# Patient Record
Sex: FEMALE | Race: WHITE | NOT HISPANIC OR LATINO | Employment: PART TIME | URBAN - METROPOLITAN AREA
[De-identification: names, ages, dates, MRNs, and addresses within clinical notes are randomized per-mention and may not be internally consistent; named-entity substitution may affect disease eponyms.]

---

## 2017-10-18 DIAGNOSIS — Z32.01 PREGNANCY TEST POSITIVE: Primary | ICD-10-CM

## 2017-10-26 ENCOUNTER — PRENATAL OFFICE VISIT (OUTPATIENT)
Dept: NURSING | Facility: CLINIC | Age: 32
End: 2017-10-26
Payer: COMMERCIAL

## 2017-10-26 DIAGNOSIS — Z34.90 SUPERVISION OF NORMAL PREGNANCY: ICD-10-CM

## 2017-10-26 DIAGNOSIS — Z32.01 PREGNANCY TEST POSITIVE: Primary | ICD-10-CM

## 2017-10-26 LAB
ABO + RH BLD: NORMAL
ABO + RH BLD: NORMAL
BETA HCG QUAL IFA URINE: POSITIVE
BLD GP AB SCN SERPL QL: NORMAL
BLOOD BANK CMNT PATIENT-IMP: NORMAL
ERYTHROCYTE [DISTWIDTH] IN BLOOD BY AUTOMATED COUNT: 12.9 % (ref 10–15)
HCT VFR BLD AUTO: 37.5 % (ref 35–47)
HGB BLD-MCNC: 12.4 G/DL (ref 11.7–15.7)
MCH RBC QN AUTO: 30.5 PG (ref 26.5–33)
MCHC RBC AUTO-ENTMCNC: 33.1 G/DL (ref 31.5–36.5)
MCV RBC AUTO: 92 FL (ref 78–100)
PLATELET # BLD AUTO: 155 10E9/L (ref 150–450)
RBC # BLD AUTO: 4.06 10E12/L (ref 3.8–5.2)
SPECIMEN EXP DATE BLD: NORMAL
WBC # BLD AUTO: 8.8 10E9/L (ref 4–11)

## 2017-10-26 PROCEDURE — 86900 BLOOD TYPING SEROLOGIC ABO: CPT | Performed by: FAMILY MEDICINE

## 2017-10-26 PROCEDURE — 86901 BLOOD TYPING SEROLOGIC RH(D): CPT | Performed by: FAMILY MEDICINE

## 2017-10-26 PROCEDURE — 84703 CHORIONIC GONADOTROPIN ASSAY: CPT | Performed by: FAMILY MEDICINE

## 2017-10-26 PROCEDURE — 99207 ZZC NO CHARGE NURSE ONLY: CPT

## 2017-10-26 PROCEDURE — 85027 COMPLETE CBC AUTOMATED: CPT | Performed by: FAMILY MEDICINE

## 2017-10-26 PROCEDURE — 87389 HIV-1 AG W/HIV-1&-2 AB AG IA: CPT | Performed by: FAMILY MEDICINE

## 2017-10-26 PROCEDURE — 86762 RUBELLA ANTIBODY: CPT | Performed by: FAMILY MEDICINE

## 2017-10-26 PROCEDURE — G0499 HEPB SCREEN HIGH RISK INDIV: HCPCS | Performed by: FAMILY MEDICINE

## 2017-10-26 PROCEDURE — 86850 RBC ANTIBODY SCREEN: CPT | Performed by: FAMILY MEDICINE

## 2017-10-26 PROCEDURE — 36415 COLL VENOUS BLD VENIPUNCTURE: CPT | Performed by: FAMILY MEDICINE

## 2017-10-26 PROCEDURE — 87086 URINE CULTURE/COLONY COUNT: CPT | Performed by: FAMILY MEDICINE

## 2017-10-26 PROCEDURE — 86780 TREPONEMA PALLIDUM: CPT | Performed by: FAMILY MEDICINE

## 2017-10-26 RX ORDER — PRENATAL VIT/IRON FUM/FOLIC AC 27MG-0.8MG
1 TABLET ORAL DAILY
Qty: 100 TABLET | Refills: 3 | COMMUNITY
Start: 2017-10-26 | End: 2024-02-27

## 2017-10-26 NOTE — MR AVS SNAPSHOT
After Visit Summary   10/26/2017    Anne Costello    MRN: [id number]           Patient Information     Date Of Birth          1985        Visit Information        Provider Department      10/26/2017 9:00 AM RI PRENATAL NURSE [facility]        Today's Diagnoses     Pregnancy test positive    -  1    Supervision of normal pregnancy           Follow-ups after your visit        Your next 10 appointments already scheduled     Oct 31, 2017  8:00 AM CDT   New Prenatal with GURDEEP Rodríguez CNM   [facility]    [address]  [facility] [id number] [phone]            Nov 28, 2017  9:00 AM CST   ESTABLISHED PRENATAL with Viktoria Eldridge MD   [facility]    [address]  [facility] [id number] [phone]            Dec 27, 2017 10:30 AM CST   ESTABLISHED PRENATAL with Eli Fung DO   [facility])    [address]  [facility] [id number] [phone]              Future tests that were ordered for you today     Open Future Orders        Priority Expected Expires Ordered    US OB <14 Weeks w Transvaginal Single Routine  10/26/2018 10/26/2017            Who to contact     If you have questions or need follow up information about today's clinic visit or your schedule please contact [facility] directly at [phone].  Normal or non-critical lab and imaging results will be communicated to you by MyChart, letter or phone within 4 business days after the clinic has received the results. If you do not hear from us within 7 days, please contact the clinic through MyChart or phone. If you have a critical or abnormal lab result, we will notify you by phone as soon as possible.  Submit refill requests through Game [employer] or call your pharmacy and they will forward the refill request to  "us. Please allow 3 business days for your refill to be completed.          Additional Information About Your Visit        Plazeshart Information     [employer] lets you send messages to your doctor, view your test results, renew your prescriptions, schedule appointments and more. To sign up, go to [email or web address] . Click on \"Log in\" on the left side of the screen, which will take you to the Welcome page. Then click on \"Sign up Now\" on the right side of the page.     You will be asked to enter the access code listed below, as well as some personal information. Please follow the directions to create your username and password.     Your access code is: [email or web address]  Expires: 2018 10:50 AM     Your access code will  in 90 days. If you need help or a new code, please call your [place] clinic or [phone].        Care EveryWhere ID     This is your [facility] EveryWhere ID. This could be used by other organizations to access your [facility] medical records  [id number]        Your Vitals Were     Last Period                   2017 (Approximate)            Blood Pressure from Last 3 Encounters:   No data found for BP    Weight from Last 3 Encounters:   No data found for Wt              We Performed the Following     ABO/Rh type and screen     Anti Treponema     Beta HCG qual IFA urine - [facility] and [place]     CBC with platelets     Hepatitis B surface antigen     HIV Antigen Antibody Combo     Rubella Antibody IgG Quantitative     Urine Culture Aerobic Bacterial        Primary Care Provider Office Phone # Fax #    [facility] [phone]       [address] [place] [address]        Equal Access to Services     [facility] GABRIELA : Hadii aad ku hadasho Soalconali, waaxda luqadaha, qaybta kaalmada adeegyada, basil melchor. So [facility] [phone].    ATENCIÓN: Si habla español, tiene a narayanan disposición servicios gratuitos de asistencia lingüística. " Darrion meyer [phone].    We comply with applicable federal civil rights laws and [place] laws. We do not discriminate on the basis of race, color, national origin, age, disability, sex, sexual orientation, or gender identity.            Thank you!     Thank you for choosing [facility]  for your care. Our goal is always to provide you with excellent care. Hearing back from our patients is one way we can continue to improve our services. Please take a few minutes to complete the written survey that you may receive in the mail after your visit with us. Thank you!             Your Updated Medication List - Protect others around you: Learn how to safely use, store and throw away your medicines at www.disposemymeds.org.          This list is accurate as of: 10/26/17 10:50 AM.  Always use your most recent med list.                   Brand Name Dispense Instructions for use Diagnosis    prenatal multivitamin plus iron 27-0.8 MG Tabs per tablet     100 tablet    Take 1 tablet by mouth daily

## 2017-10-26 NOTE — NURSING NOTE
NPN nurse visit. 1st dr visit scheduled for 11/28/17 with Eli Fung D.O. Pap not due. Last pap 11/2015.  17w1d  AVA Dela Cruz RN

## 2017-10-27 LAB
BACTERIA SPEC CULT: NORMAL
HBV SURFACE AG SERPL QL IA: NONREACTIVE
HIV 1+2 AB+HIV1 P24 AG SERPL QL IA: NONREACTIVE
RUBV IGG SERPL IA-ACNC: 15 IU/ML
SPECIMEN SOURCE: NORMAL
T PALLIDUM IGG+IGM SER QL: NEGATIVE

## 2017-10-31 ENCOUNTER — PRENATAL OFFICE VISIT (OUTPATIENT)
Dept: OBGYN | Facility: CLINIC | Age: 32
End: 2017-10-31
Payer: COMMERCIAL

## 2017-10-31 VITALS
HEART RATE: 66 BPM | HEIGHT: 69 IN | SYSTOLIC BLOOD PRESSURE: 126 MMHG | DIASTOLIC BLOOD PRESSURE: 70 MMHG | BODY MASS INDEX: 26.79 KG/M2 | WEIGHT: 180.9 LBS

## 2017-10-31 DIAGNOSIS — Z34.82 ENCOUNTER FOR SUPERVISION OF OTHER NORMAL PREGNANCY IN SECOND TRIMESTER: Primary | ICD-10-CM

## 2017-10-31 PROCEDURE — 99207 ZZC FIRST OB VISIT: CPT | Performed by: ADVANCED PRACTICE MIDWIFE

## 2017-10-31 NOTE — MR AVS SNAPSHOT
After Visit Summary   10/31/2017    Anne Costello    MRN: [id number]           Patient Information     Date Of Birth          1985        Visit Information        Provider Department      10/31/2017 8:00 AM Nicole Aldrich, GURDEEP LATANYA [facility]        Today's Diagnoses     Encounter for supervision of other normal pregnancy in second trimester    -  1       Follow-ups after your visit        Follow-up notes from your care team     Return in about 4 weeks (around 11/28/2017).      Your next 10 appointments already scheduled     Nov 15, 2017  9:45 AM CST   US OB < 14 WEEKS WITH TRANSVAGINAL SINGLE with RIUS1   [facility])    [address]  [facility] [id number] [phone]           Please bring a list of your medicines (including vitamins, minerals and over-the-counter drugs). Also, tell your doctor about any allergies you may have. Wear comfortable clothes and leave your valuables at home.  If you re less than 20 weeks drink four 8-ounce glasses of fluid an hour before your exam. If you need to empty your bladder before your exam, try to release only a little urine. Then, drink another glass of fluid.  You may have up to two family members in the exam room. If you bring a small child, an adult must be there to care for him or her.  Please call the Imaging Department at your exam site with any questions.            Nov 28, 2017  9:00 AM CST   ESTABLISHED PRENATAL with Viktoria Eldridge MD   [facility] ([facility])    [address] [place] [id number] [phone]            Dec 27, 2017 10:30 AM CST   ESTABLISHED PRENATAL with Eli Fung DO   [facility] ([facility])    [address] [place] [address] [phone]              Who to contact     If you have questions or need follow up  "information about today's clinic visit or your schedule please contact [facility] directly at [phone].  Normal or non-critical lab and imaging results will be communicated to you by MyChart, letter or phone within 4 business days after the clinic has received the results. If you do not hear from us within 7 days, please contact the clinic through [employer] or phone. If you have a critical or abnormal lab result, we will notify you by phone as soon as possible.  Submit refill requests through [employer] or call your pharmacy and they will forward the refill request to us. Please allow 3 business days for your refill to be completed.          Additional Information About Your Visit        MyChart Information     [employer] lets you send messages to your doctor, view your test results, renew your prescriptions, schedule appointments and more. To sign up, go to [email or web address] . Click on \"Log in\" on the left side of the screen, which will take you to the Welcome page. Then click on \"Sign up Now\" on the right side of the page.     You will be asked to enter the access code listed below, as well as some personal information. Please follow the directions to create your username and password.     Your access code is: [email or web address]  Expires: 2018 10:50 AM     Your access code will  in 90 days. If you need help or a new code, please call your [facility] clinic or [phone].        Care EveryWhere ID     This is your Care EveryWhere ID. This could be used by other organizations to access your [facility] medical records  [id number]        Your Vitals Were     Pulse Height Last Period BMI (Body Mass Index)          66 5' 9\" (1.753 m) 2017 (Approximate) 26.71 kg/m2         Blood Pressure from Last 3 Encounters:   10/31/17 126/70    Weight from Last 3 Encounters:   10/31/17 180 lb 14.4 oz (82.1 kg)              Today, you had the following     No orders found for display       Primary Care " Provider Office Phone # Fax #    [facility] [phone]       [address] [place] [address] [facility] [address]        Equal Access to Services     MONSERRAT OCHOA : Hadii aad ku hadclaudineo Soalconali, waaxda luqadaha, qaybta kaalmada adeivana, basil camposkirt greenwoodmarylou owen froilan melchor. So [facility] [phone].    ATENCIÓN: Si habla español, tiene a narayanan disposición servicios gratuitos de asistencia lingüística. Llame al [phone].    We comply with applicable federal civil rights laws and [place] laws. We do not discriminate on the basis of race, color, national origin, age, disability, sex, sexual orientation, or gender identity.            Thank you!     Thank you for choosing [facility]  for your care. Our goal is always to provide you with excellent care. Hearing back from our patients is one way we can continue to improve our services. Please take a few minutes to complete the written survey that you may receive in the mail after your visit with us. Thank you!             Your Updated Medication List - Protect others around you: Learn how to safely use, store and throw away your medicines at www.disposemymeds.org.          This list is accurate as of: 10/31/17  8:57 AM.  Always use your most recent med list.                   Brand Name Dispense Instructions for use Diagnosis    prenatal multivitamin plus iron 27-0.8 MG Tabs per tablet     100 tablet    Take 1 tablet by mouth daily

## 2017-10-31 NOTE — PROGRESS NOTES
PRENATAL VISIT   FIRST OBSTETRICAL EXAM - OB    Assessment / Impression     , Normal first prenatal visit at 17w6d  First son has Ya Galactosemia    Plan:     1. Initial labs reviewed today  2. Pap smear due   3. GC/CT declined  4. Denies any concerns with depression or anxiety.   5. Medications: Prenatal vitamins. Encouraged a vitamin D3, an omega 3 fatty acid supplement daily as well.   6. Problem list reviewed and updated.  7. Genetic screening: discussed and declined.   8. Role of ultrasound in pregnancy discussed; dating/viability ultrasound declined, has 20 week fetal survey scheduled for November 15  9. Optimal nutrition and weight gain discussed. Pregnancy weight gain of 15-25# encouraged.   10. Oriented to CNM care and philosophy;  group, on-call and contact info discussed.  11. Appropriate anticipatory guidance including nutrition & supplements, weight gain recommendations, exercise, resources, lab testing & warning signs discussed.  .  12. Follow up: 4 weeks    TT with patient 40 minutes >50% time spent in counseling or coordination of care.    Subjective:    Anne Costello is a 32 year old  here today for her First Obstetrical Exam. Here alone.Patient's last menstrual period was 2017 (approximate).  Last period was normal.  Denies any concerns to questions today.  Excited to hear about CNM care and would like to continue.  First child has Ya Galactosemia and this baby has 25% chance of disease.  Infant will be tested with PKU testing at 24 hours of life.      Current symptoms also include: none    Obstetric History       T1      L1     SAB0   TAB0   Ectopic0   Multiple0   Live Births1       # Outcome Date GA Lbr Yusuf/2nd Weight Sex Delivery Anes PTL Lv   2 Current            1 Term 07/10/16 42w0d  8 lb 5 oz (3.771 kg) M  EPI  STEPHANIE      Name: Toan          2018, by Last Menstrual Period  No past medical history on file.  No past surgical history on file.  Social  History   Substance Use Topics     Smoking status: Former Smoker     Quit date: 10/1/2015     Smokeless tobacco: Never Used     Alcohol use No     Current Outpatient Prescriptions   Medication Sig Dispense Refill     Prenatal Vit-Fe Fumarate-FA (PRENATAL MULTIVITAMIN PLUS IRON) 27-0.8 MG TABS per tablet Take 1 tablet by mouth daily 100 tablet 3     No Known Allergies       High Risk Behavior: None    Review of Systems  General:  Denies problem  Eyes: Denies problem  Ears/Nose/Throat: Denies problem  Cardiovascular: Denies problem  Respiratory:  Denies problem  Gastrointestinal:  Denies problem, Genitourinary: Denies problem  Musculoskeletal:  Denies problem  Skin: Denies problem  Neurologic: Denies problem  Psychiatric: Denies problem  Endocrine: Denies problem        Objective:     Physical Exam:  General Appearance: Alert, cooperative, no distress, appears stated age  Skin: Skin color, texture, turgor normal, no rashes or lesions  Throat: Lips, mucosa, and tongue normal; teeth and gums normal  HEENT: grossly normal; otoscopic and opthalmic exam not performed.   Neck: Supple, symmetrical, trachea midline, no adenopathy;  thyroid: not enlarged, symmetric, no tenderness/mass/nodules  Lungs: Clear to auscultation bilaterally, respirations unlabored  Breasts: Deferred  Heart: Regular rate and rhythm, S1 and S2 normal, no murmur, rub, or gallop   Abdomen: Soft, non-tender, no masses, no organomegaly, fundus 1 below umbilicus  Pelvic:Deferred  Extremities: Extremities normal without varicosities or edema      Lab:   Results for orders placed or performed in visit on 10/26/17   Rubella Antibody IgG Quantitative   Result Value Ref Range    Rubella Antibody IgG Quantitative 15 IU/mL   Beta HCG qual IFA urine - [facility] and [place]   Result Value Ref Range    Beta HCG Qual IFA Urine Positive (A) NEG^Negative      HIV Antigen Antibody Combo   Result Value Ref Range    HIV Antigen Antibody Combo Nonreactive NR^Nonreactive        Anti Treponema   Result Value Ref Range    Treponema pallidum Antibody Negative NEG^Negative   CBC with platelets   Result Value Ref Range    WBC 8.8 4.0 - 11.0 10e9/L    RBC Count 4.06 3.8 - 5.2 10e12/L    Hemoglobin 12.4 11.7 - 15.7 g/dL    Hematocrit 37.5 35.0 - 47.0 %    MCV 92 78 - 100 fl    MCH 30.5 26.5 - 33.0 pg    MCHC 33.1 31.5 - 36.5 g/dL    RDW 12.9 10.0 - 15.0 %    Platelet Count 155 150 - 450 10e9/L   Hepatitis B surface antigen   Result Value Ref Range    Hep B Surface Agn Nonreactive NR^Nonreactive   ABO/Rh type and screen   Result Value Ref Range    ABO O     RH(D) Pos     Antibody Screen Neg     Test Valid Only At [facility]        Specimen Expires 10/29/2017    Urine Culture Aerobic Bacterial   Result Value Ref Range    Specimen Description Midstream Urine     Culture Micro <10,000 colonies/mL  mixed urogenital dariel          30 minutes was spent face to face with the patient today discussing her history, diagnosis, and follow-up plan as noted above. Over 50% of the visit was spent in counseling and coordination of care of prenatal care.    GURDEEP Hartley, MONTEZ, CAITIE

## 2017-10-31 NOTE — NURSING NOTE
"Chief Complaint   Patient presents with     Prenatal Care     Flu Shot       Initial /70 (BP Location: Left arm, Patient Position: Sitting, Cuff Size: Adult Regular)  Pulse 66  Ht 5' 9\" (1.753 m)  Wt 180 lb 14.4 oz (82.1 kg)  LMP 06/28/2017 (Approximate)  BMI 26.71 kg/m2 Estimated body mass index is 26.71 kg/(m^2) as calculated from the following:    Height as of this encounter: 5' 9\" (1.753 m).    Weight as of this encounter: 180 lb 14.4 oz (82.1 kg).  Medication Reconciliation: complete     17w6d    Jenna Wood CMA      "

## 2017-11-15 ENCOUNTER — RADIANT APPOINTMENT (OUTPATIENT)
Dept: ULTRASOUND IMAGING | Facility: CLINIC | Age: 32
End: 2017-11-15
Attending: OBSTETRICS & GYNECOLOGY
Payer: COMMERCIAL

## 2017-11-15 ENCOUNTER — TELEPHONE (OUTPATIENT)
Dept: OBGYN | Facility: CLINIC | Age: 32
End: 2017-11-15

## 2017-11-15 DIAGNOSIS — Z32.01 PREGNANCY TEST POSITIVE: ICD-10-CM

## 2017-11-15 PROCEDURE — 76805 OB US >/= 14 WKS SNGL FETUS: CPT | Performed by: FAMILY MEDICINE

## 2017-11-15 NOTE — TELEPHONE ENCOUNTER
Patient calling stating she is experiencing some vaginal bleeding today after intercourse. States it started about 10 minutes after intercourse. She states it is more than spotting, but not heavy. She noticed a couple of drops in the toilet with voiding and then had to wipe a few times to stop the bleeding. She is not having any abdominal pain or contractions. She is 20w0d. Encouraged pelvic rest and fluids. Instructed patient to continue monitoring bleeding and to call back if it becomes heavier or if she starts having any pain. She was placed on Dr. Eldridge's schedule for tomorrow to follow up.     Natasha Springer RN

## 2017-11-16 ENCOUNTER — OFFICE VISIT (OUTPATIENT)
Dept: OBGYN | Facility: CLINIC | Age: 32
End: 2017-11-16
Payer: COMMERCIAL

## 2017-11-16 VITALS
SYSTOLIC BLOOD PRESSURE: 118 MMHG | WEIGHT: 187 LBS | DIASTOLIC BLOOD PRESSURE: 70 MMHG | BODY MASS INDEX: 27.62 KG/M2 | HEART RATE: 78 BPM

## 2017-11-16 DIAGNOSIS — O35.EXX0 PYELECTASIS OF FETUS ON PRENATAL ULTRASOUND: ICD-10-CM

## 2017-11-16 DIAGNOSIS — Z34.82 ENCOUNTER FOR SUPERVISION OF OTHER NORMAL PREGNANCY IN SECOND TRIMESTER: Primary | ICD-10-CM

## 2017-11-16 DIAGNOSIS — N93.0 POSTCOITAL BLEEDING: ICD-10-CM

## 2017-11-16 LAB
SPECIMEN SOURCE: NORMAL
WET PREP SPEC: NORMAL

## 2017-11-16 PROCEDURE — 87210 SMEAR WET MOUNT SALINE/INK: CPT | Performed by: ADVANCED PRACTICE MIDWIFE

## 2017-11-16 PROCEDURE — 99207 ZZC PRENATAL VISIT: CPT | Performed by: ADVANCED PRACTICE MIDWIFE

## 2017-11-16 NOTE — MR AVS SNAPSHOT
After Visit Summary   11/16/2017    Anne Costello    MRN: [id number]           Patient Information     Date Of Birth          1985        Visit Information        Provider Department      11/16/2017 1:45 PM Nicole Aldrich APRN CNM [facility]        Today's Diagnoses     Encounter for supervision of other normal pregnancy in second trimester    -  1    Postcoital bleeding        Pyelectasis of fetus on prenatal ultrasound           Follow-ups after your visit        Additional Services     MAT FETAL MED CTR REFERRAL-PREGNANCY       >> Patient may proceed with recommendations for further testing as directed by the Maternal Fetal Medicine Specialist >>    >> If requesting Fetal Echo: M will determine appropriate location for exam due to indication.    >> If requesting Lung Maturity Amnio:  If results indicate fetal lung maturity, induction or C/S is recommended within 36 hours.  Please schedule accordingly.     Dear Patient:   Please be aware that coverage of these services is subject to the terms and limitations of your health insurance plan.  Call member services at your health plan with any benefit or coverage questions.      Please bring the following to your appointment:    >>  Any x-rays, CTs or MRIs which have been performed.  Contact the facility where they were done to arrange for  prior to your scheduled appointment.  Any new CT, MRI or other procedures ordered by your specialist must be performed at a [facility] or coordinated by your clinic's referral office.  >>  List of current medications   >>  This referral request   >>  Any documents/labs given to you for this referral                  Your next 10 appointments already scheduled     Nov 28, 2017  9:00 AM CST   ESTABLISHED PRENATAL with Viktoria Eldridge MD   [facility]    [address]  [facility] [id number] [phone]            Jan  "10, 2018  3:15 PM CST   ESTABLISHED PRENATAL with Eli Fung, DO   [facility]    [address]  [facility] [address] [phone]              Who to contact     If you have questions or need follow up information about today's clinic visit or your schedule please contact [facility] directly at [phone].  Normal or non-critical lab and imaging results will be communicated to you by Unleashed Softwarehart, letter or phone within 4 business days after the clinic has received the results. If you do not hear from us within 7 days, please contact the clinic through [employer]Your[employer] or phone. If you have a critical or abnormal lab result, we will notify you by phone as soon as possible.  Submit refill requests through [employer] or call your pharmacy and they will forward the refill request to us. Please allow 3 business days for your refill to be completed.          Additional Information About Your Visit        51[employer] Information     [employer] lets you send messages to your doctor, view your test results, renew your prescriptions, schedule appointments and more. To sign up, go to [email or web address] . Click on \"Log in\" on the left side of the screen, which will take you to the Welcome page. Then click on \"Sign up Now\" on the right side of the page.     You will be asked to enter the access code listed below, as well as some personal information. Please follow the directions to create your username and password.     Your access code is: [email or web address]  Expires: 2018  9:50 AM     Your access code will  in 90 days. If you need help or a new code, please call your [facility] clinic or [phone].        Care EveryWhere ID     This is your Care EveryWhere ID. This could be used by other organizations to access your [facility] medical records  [id number]        Your Vitals Were     Pulse Last Period Breastfeeding? BMI (Body Mass Index)          78 " 06/28/2017 (Approximate) No 27.62 kg/m2         Blood Pressure from Last 3 Encounters:   11/16/17 118/70   10/31/17 126/70    Weight from Last 3 Encounters:   11/16/17 187 lb (84.8 kg)   10/31/17 180 lb 14.4 oz (82.1 kg)              We Performed the Following     MAT FETAL MED CTR REFERRAL-PREGNANCY        Primary Care Provider Office Phone # Fax #    [facility] [phone]       [address] [place] [address]        Equal Access to Services     [facility]: Hadii aad ku hadasho Soomaali, waaxda luqadaha, qaybta kaalmada adeegyamagdy, basil mcgovern . So [facility] [phone].    ATENCIÓN: Si habla español, tiene a narayanan disposición servicios gratuitos de asistencia lingüística. Koriname al [phone].    We comply with applicable federal civil rights laws and [place] laws. We do not discriminate on the basis of race, color, national origin, age, disability, sex, sexual orientation, or gender identity.            Thank you!     Thank you for choosing [facility]  for your care. Our goal is always to provide you with excellent care. Hearing back from our patients is one way we can continue to improve our services. Please take a few minutes to complete the written survey that you may receive in the mail after your visit with us. Thank you!             Your Updated Medication List - Protect others around you: Learn how to safely use, store and throw away your medicines at www.disposemymeds.org.          This list is accurate as of: 11/16/17  2:16 PM.  Always use your most recent med list.                   Brand Name Dispense Instructions for use Diagnosis    prenatal multivitamin plus iron 27-0.8 MG Tabs per tablet     100 tablet    Take 1 tablet by mouth daily

## 2017-11-16 NOTE — NURSING NOTE
"Chief Complaint   Patient presents with     Abnormal Uterine Bleeding     Dark blood after wiping       Initial /70 (BP Location: Right arm, Patient Position: Sitting, Cuff Size: Adult Regular)  Pulse 78  Wt 187 lb (84.8 kg)  LMP 2017 (Approximate)  Breastfeeding? No  BMI 27.62 kg/m2 Estimated body mass index is 27.62 kg/(m^2) as calculated from the following:    Height as of 10/31/17: 5' 9\" (1.753 m).    Weight as of this encounter: 187 lb (84.8 kg).  BP completed using cuff size: regular        The following HM Due: NONE    patient has appointment for today.  20w1d  Doug Castillo [facility]                "

## 2017-11-16 NOTE — PROGRESS NOTES
"Patient presented to clinic today with complaints of post-coital bleeding.  States that after intercourse yesterday she had a \"large\" amount of bright red blood noted after wiping and in the toilet.  States that the bleeding slowly tapered off during the day and she has not had any further bleeding today.  States that she had noticed some \"dark mucus\" around 5 days ago.  Denies any STD risk.  Denies any changes in vaginal odor, vaginal discharge or vaginal itching.  Denies any abdominal pain or cramping.  Patient reports normal fetal movement.  Patient had 20 fetal survey yesterday with normal fetal results except for 4 mm right kidney pyelectasis.  Placenta is anterior and mid position.  No report of a low lying placenta.      Exam:  Speculum exam perfomred in the usual manner, small amount of thin brown blood noted in vagina.  No bleeding noted from os.  No vaginal discharge noted. Wet prep collected.                Digital exam revealed closed/thick/long and firm.                 FHTs 140 per doppler, movement heard on doppler      ASSESSMENT/PLAN  Anne Costello is a 32 year old   @ 20w1d with 1) post-coital bleeding with unknown etiology    2) routine US with unilateral pyelectasis; MFM referral ordered, patient to schedule    - Return to clinic as needed and in 4 weeks for return OB visit  - Monitor vaginal bleeding, call clinic if occurs again  - Reassured patient that bleeding not concerning at this time   - Wet prep negative  - Call with questions/concerns      20 minutes was spent face to face with the patient today discussing her history, diagnosis, and follow-up plan as noted above. Over 50% of the visit was spent in counseling and coordination of care of post coital vaginal bleeding, ultrasound follow up.    GURDEEP Hartley, CNM, WHSHAAN       "

## 2017-11-16 NOTE — LETTER
[facility]  [address], [address]  [place] [address]  [phone]        November 16, 2017    Anne Costello  [address] [facility] [address]            Dear Ms. Anne Costello:      The results of your recent Wet Prep is NORMAL.  Results for orders placed or performed in visit on 11/16/17   Wet prep   Result Value Ref Range    Specimen Description Vagina     Wet Prep No Trichomonas seen     Wet Prep No clue cells seen     Wet Prep No yeast seen      If you have any further questions or problems, please contact our office.    Sincerely,      GURDEEP Hartley, MONTEZ

## 2017-11-20 ENCOUNTER — PRE VISIT (OUTPATIENT)
Dept: MATERNAL FETAL MEDICINE | Facility: CLINIC | Age: 32
End: 2017-11-20

## 2017-11-27 ENCOUNTER — OFFICE VISIT (OUTPATIENT)
Dept: MATERNAL FETAL MEDICINE | Facility: CLINIC | Age: 32
End: 2017-11-27
Attending: OBSTETRICS & GYNECOLOGY
Payer: COMMERCIAL

## 2017-11-27 ENCOUNTER — HOSPITAL ENCOUNTER (OUTPATIENT)
Dept: ULTRASOUND IMAGING | Facility: CLINIC | Age: 32
Discharge: HOME | End: 2017-11-27
Attending: OBSTETRICS & GYNECOLOGY | Admitting: OBSTETRICS & GYNECOLOGY
Payer: COMMERCIAL

## 2017-11-27 DIAGNOSIS — O26.90 PREGNANCY RELATED CONDITION, UNSPECIFIED TRIMESTER: ICD-10-CM

## 2017-11-27 DIAGNOSIS — O35.EXX0 PYELECTASIS OF FETUS ON PRENATAL ULTRASOUND: Primary | ICD-10-CM

## 2017-11-27 PROCEDURE — 76811 OB US DETAILED SNGL FETUS: CPT

## 2017-11-27 NOTE — MR AVS SNAPSHOT
After Visit Summary   11/27/2017    Anne Costello    MRN: [id number]           Patient Information     Date Of Birth          1985        Visit Information        Provider Department      11/27/2017 2:45 PM Joyce Mata MD [facility] Maternal Fetal Medicine [facility]        Today's Diagnoses     Pyelectasis of fetus on prenatal ultrasound    -  1       Follow-ups after your visit        Your next 10 appointments already scheduled     Nov 28, 2017  9:00 AM CST   ESTABLISHED PRENATAL with Viktoria Eldridge MD   [facility]    [address]  [facility] [address]   [phone]            Cory 10, 2018  3:15 PM CST   ESTABLISHED PRENATAL with Eli Fung DO   [facility]    [address] [place]  [facility] [address]   [phone]              Future tests that were ordered for you today     Open Future Orders        Priority Expected Expires Ordered    MFM US Comprehensive Single F/U Routine  11/27/2018 11/27/2017            Who to contact     If you have questions or need follow up information about today's clinic visit or your schedule please contact [facility]ERNAL FETAL MEDIC[facility] directly at [phone].  Normal or non-critical lab and imaging results will be communicated to you by [employer]thart, letter or phone within 4 business days after the clinic has received the results. If you do not hear from us within 7 days, please contact the clinic through [employer] or phone. If you have a critical or abnormal lab result, we will notify you by phone as soon as possible.  Submit refill requests through [employer] or call your pharmacy and they will forward the refill request to us. Please allow 3 business days for your refill to be completed.          Additional Information About Your Visit        [employer] Information     [employer] lets you send messages to your doctor,  "view your test results, renew your prescriptions, schedule appointments and more. To sign up, go to [email or web address] . Click on \"Log in\" on the left side of the screen, which will take you to the Welcome page. Then click on \"Sign up Now\" on the right side of the page.     You will be asked to enter the access code listed below, as well as some personal information. Please follow the directions to create your username and password.     Your access code is: [email or web address]  Expires: 2018  9:50 AM     Your access code will  in 90 days. If you need help or a new code, please call your [facility] clinic or [phone].        Care EveryWhere ID     This is your Care EveryWhere ID. This could be used by other organizations to access your [facility] medical records  [id number]        Your Vitals Were     Last Period                   2017 (Approximate)            Blood Pressure from Last 3 Encounters:   17 118/70   10/31/17 126/70    Weight from Last 3 Encounters:   17 84.8 kg (187 lb)   10/31/17 82.1 kg (180 lb 14.4 oz)               Primary Care Provider Office Phone # Fax #    [facility] [phone]       [address] [place] [address]        Equal Access to Services     MONSERRAT OCHOA AH: Hadii aad ku hadasho Soomaali, waaxda luqadaha, qaybta kaalmada adeegyada, basil melchor. So [facility] [phone].    ATENCIÓN: Si habla español, tiene a narayanan disposición servicios gratuitos de asistencia lingüística. Darrion al [phone].    We comply with applicable federal civil rights laws and [place] laws. We do not discriminate on the basis of race, color, national origin, age, disability, sex, sexual orientation, or gender identity.            Thank you!     Thank you for choosing [facility]  for your care. Our goal is always to provide you with excellent care. Hearing back from our patients is one " way we can continue to improve our services. Please take a few minutes to complete the written survey that you may receive in the mail after your visit with us. Thank you!             Your Updated Medication List - Protect others around you: Learn how to safely use, store and throw away your medicines at www.disposemymeds.org.          This list is accurate as of: 11/27/17  3:38 PM.  Always use your most recent med list.                   Brand Name Dispense Instructions for use Diagnosis    prenatal multivitamin plus iron 27-0.8 MG Tabs per tablet     100 tablet    Take 1 tablet by mouth daily

## 2017-11-27 NOTE — PROGRESS NOTES
Please see ultrasound report under imaging tab for details on ultrasound performed today.    Joyce Mata MD  , OB/GYN  Maternal-Fetal Medicine  [email or web address]  [phone] (Academic office)  [phone] (Pager)

## 2017-11-28 ENCOUNTER — PRENATAL OFFICE VISIT (OUTPATIENT)
Dept: OBGYN | Facility: CLINIC | Age: 32
End: 2017-11-28
Payer: COMMERCIAL

## 2017-11-28 VITALS
WEIGHT: 183.3 LBS | SYSTOLIC BLOOD PRESSURE: 100 MMHG | HEART RATE: 76 BPM | DIASTOLIC BLOOD PRESSURE: 60 MMHG | BODY MASS INDEX: 27.07 KG/M2

## 2017-11-28 DIAGNOSIS — O35.EXX0 PYELECTASIS OF FETUS ON PRENATAL ULTRASOUND: ICD-10-CM

## 2017-11-28 DIAGNOSIS — Z34.82 ENCOUNTER FOR SUPERVISION OF OTHER NORMAL PREGNANCY IN SECOND TRIMESTER: Primary | ICD-10-CM

## 2017-11-28 PROCEDURE — 99207 ZZC PRENATAL VISIT: CPT | Performed by: OBSTETRICS & GYNECOLOGY

## 2017-11-28 PROCEDURE — 59425 ANTEPARTUM CARE ONLY: CPT | Performed by: OBSTETRICS & GYNECOLOGY

## 2017-11-28 NOTE — NURSING NOTE
"Chief Complaint   Patient presents with     Prenatal Care   21w6d  No specific concerns  Discuss flu shot    Initial /60 (BP Location: Left arm, Patient Position: Sitting, Cuff Size: Adult Regular)  Pulse 76  Wt 183 lb 4.8 oz (83.1 kg)  LMP 06/28/2017 (Approximate)  BMI 27.07 kg/m2 Estimated body mass index is 27.07 kg/(m^2) as calculated from the following:    Height as of 10/31/17: 5' 9\" (1.753 m).    Weight as of this encounter: 183 lb 4.8 oz (83.1 kg).  Medication Reconciliation: complete    "

## 2017-11-28 NOTE — MR AVS SNAPSHOT
After Visit Summary   11/28/2017    Anne Costello    MRN: [id number]           Patient Information     Date Of Birth          1985        Visit Information        Provider Department      11/28/2017 9:00 AM Viktoria Eldridge MD [facility]        Today's Diagnoses     Encounter for supervision of other normal pregnancy in second trimester    -  1    Pyelectasis of fetus on prenatal ultrasound           Follow-ups after your visit        Your next 10 appointments already scheduled     Cory 10, 2018  3:15 PM CST   ESTABLISHED PRENATAL with Eli Fung DO   [facility]    [address] [place]  [facility] [address]   [phone]            Feb 09, 2018  9:30 AM CST   MFM US COMPRE SINGLE F/U with GONSALO   [facility] Maternal Fetal Medicine Ultrasound - [facility])    [address]  [facility] [address] [phone]           Wear comfortable clothes and leave your valuables at home.            Feb 09, 2018 10:00 AM CST   [facility]   [facility] Maternal Fetal Medicine [facility]    [address]  [facility] [id number] [phone]           Please arrive at the time given for your first appointment. This visit is used internally to schedule the physician's time during your ultrasound.              Future tests that were ordered for you today     Open Future Orders        Priority Expected Expires Ordered    MFM US Comprehensive Single F/U Routine  11/27/2018 11/27/2017            Who to contact     If you have questions or need follow up information about today's clinic visit or your schedule please contact [facility] directly at [phone].  Normal or non-critical lab and imaging results will be communicated to you by MyChart, letter or phone within 4 business days after the clinic has received  the results. If you do not hear from us within 7 days, please contact the clinic through [employer] or phone. If you have a critical or abnormal lab result, we will notify you by phone as soon as possible.  Submit refill requests through [employer] or call your pharmacy and they will forward the refill request to us. Please allow 3 business days for your refill to be completed.          Additional Information About Your Visit        Care EveryWhere ID     This is your Care EveryWhere ID. This could be used by other organizations to access your [facility] medical records  [id number]        Your Vitals Were     Pulse Last Period BMI (Body Mass Index)             76 06/28/2017 (Approximate) 27.07 kg/m2          Blood Pressure from Last 3 Encounters:   11/28/17 100/60   11/16/17 118/70   10/31/17 126/70    Weight from Last 3 Encounters:   11/28/17 183 lb 4.8 oz (83.1 kg)   11/16/17 187 lb (84.8 kg)   10/31/17 180 lb 14.4 oz (82.1 kg)              Today, you had the following     No orders found for display       Primary Care Provider Office Phone # Fax #    [facility] [phone]       [address] [place] [address]        Equal Access to Services     MONSERRAT OCHOA : Hadii jaz ku hadasho Soomaali, waaxda luqadaha, qaybta kaalmada adeegyada, waxay idiin hayarelisn marielle melchor. So [facility] [phone].    ATENCIÓN: Si habla español, tiene a narayanan disposición servicios gratuitos de asistencia lingüística. Llame al [phone].    We comply with applicable federal civil rights laws and [place] laws. We do not discriminate on the basis of race, color, national origin, age, disability, sex, sexual orientation, or gender identity.            Thank you!     Thank you for choosing [facility]  for your care. Our goal is always to provide you with excellent care. Hearing back from our patients is one way we can continue to improve our services. Please take a few minutes to  complete the written survey that you may receive in the mail after your visit with us. Thank you!             Your Updated Medication List - Protect others around you: Learn how to safely use, store and throw away your medicines at www.disposemymeds.org.          This list is accurate as of: 11/28/17  9:36 AM.  Always use your most recent med list.                   Brand Name Dispense Instructions for use Diagnosis    prenatal multivitamin plus iron 27-0.8 MG Tabs per tablet     100 tablet    Take 1 tablet by mouth daily

## 2017-11-28 NOTE — PROGRESS NOTES
CC: Here for routine prenatal visit @ 21w6d       HPI:  Doing well, denies bleeding, cramping.      Exam:   See OB flowsheet    ASSESSMENT/PLAN  Anne Costello is a 32 year old   @ 21w6d.   Left sided pyelectasis noted, MFM scan done yesterday; recommended follow-up scan with MFM at 32 wks    1)  Return to clinic 4 wks  2) BP and weight gain reviewed       This encounter was dictated using voice-recognition software; errors may be present.

## 2018-01-10 ENCOUNTER — PRENATAL OFFICE VISIT (OUTPATIENT)
Dept: OBGYN | Facility: CLINIC | Age: 33
End: 2018-01-10
Payer: MEDICAID

## 2018-01-10 VITALS
SYSTOLIC BLOOD PRESSURE: 100 MMHG | DIASTOLIC BLOOD PRESSURE: 58 MMHG | BODY MASS INDEX: 28.87 KG/M2 | WEIGHT: 194.9 LBS | HEIGHT: 69 IN

## 2018-01-10 DIAGNOSIS — Z34.93 PRENATAL CARE IN THIRD TRIMESTER: Primary | ICD-10-CM

## 2018-01-10 LAB
ERYTHROCYTE [DISTWIDTH] IN BLOOD BY AUTOMATED COUNT: 13.6 % (ref 10–15)
HCT VFR BLD AUTO: 34.2 % (ref 35–47)
HGB BLD-MCNC: 11.4 G/DL (ref 11.7–15.7)
MCH RBC QN AUTO: 31 PG (ref 26.5–33)
MCHC RBC AUTO-ENTMCNC: 33.3 G/DL (ref 31.5–36.5)
MCV RBC AUTO: 93 FL (ref 78–100)
PLATELET # BLD AUTO: 123 10E9/L (ref 150–450)
RBC # BLD AUTO: 3.68 10E12/L (ref 3.8–5.2)
WBC # BLD AUTO: 7.9 10E9/L (ref 4–11)

## 2018-01-10 PROCEDURE — 85027 COMPLETE CBC AUTOMATED: CPT | Performed by: FAMILY MEDICINE

## 2018-01-10 PROCEDURE — 90715 TDAP VACCINE 7 YRS/> IM: CPT | Performed by: FAMILY MEDICINE

## 2018-01-10 PROCEDURE — 90471 IMMUNIZATION ADMIN: CPT | Performed by: FAMILY MEDICINE

## 2018-01-10 PROCEDURE — 82950 GLUCOSE TEST: CPT | Performed by: FAMILY MEDICINE

## 2018-01-10 PROCEDURE — 36415 COLL VENOUS BLD VENIPUNCTURE: CPT | Performed by: FAMILY MEDICINE

## 2018-01-10 PROCEDURE — 99212 OFFICE O/P EST SF 10 MIN: CPT | Mod: 25 | Performed by: FAMILY MEDICINE

## 2018-01-10 PROCEDURE — 86780 TREPONEMA PALLIDUM: CPT | Performed by: FAMILY MEDICINE

## 2018-01-10 NOTE — NURSING NOTE
"Chief Complaint   Patient presents with     Prenatal Care     glucose     Imm/Inj     tdap       Initial /58  Ht 5' 9\" (1.753 m)  Wt 194 lb 14.4 oz (88.4 kg)  LMP 06/28/2017 (Approximate)  BMI 28.78 kg/m2 Estimated body mass index is 28.78 kg/(m^2) as calculated from the following:    Height as of this encounter: 5' 9\" (1.753 m).    Weight as of this encounter: 194 lb 14.4 oz (88.4 kg).  Medication Reconciliation: complete   Radha Givens CMA      "

## 2018-01-10 NOTE — PROGRESS NOTES
"CC: Here for routine prenatal visit   32 year old y/o  @ 28w0d with Estimated Date of Delivery: 2018   HPI: Pt is doing well, expresses no concerns at today's visit. No vaginal bleeding, no LOF, no contractions. + fetal movement.       This document serves as a record of the services and decisions personally performed and made by Eli Fung DO. It was created on her behalf by Shraddha Hoffman, a trained medical scribe. The creation of this document is based the provider's statements to the medical scribe.  Scribe Shraddha Hoffman 3:30 PM, January 10, 2018    /58  Ht 1.753 m (5' 9\")  Wt 88.4 kg (194 lb 14.4 oz)  LMP 2017 (Approximate)  BMI 28.78 kg/m2  See OB flowsheet    1) concerns: No concerns today  - 2017 MFM Scan Left sided pyelectasis noted; recommended follow-up scan with MFM at 32 wks; assured this is common, typically resolves  2) Routine care: Boy; GTT - results pending; Tdap given;  3) Return to clinic in 2 weeks        The information in this document, created by the medical scribe for me, accurately reflects the services I personally performed and the decisions made by me. I have reviewed and approved this document for accuracy prior to leaving the patient care area.  3:30 PM, 01/10/18    [place]    "

## 2018-01-10 NOTE — PATIENT INSTRUCTIONS
Return every 2 weeks     Phone numbers [place]:  Day/ night [phone] ask for ob triage  Emergency:  Call labor and delivery:  [phone]    What should I call about??    Contraction every 5 minutes for 1 hour 1 minute long (511), bleeding, loss of fluid, headache that doesn't resolve with tylenol, and decreased fetal movement       [facility] Address   [address], [place] [address]  [phone]    Dr. Eli Fung, DO    OB/GYN   [facility]                  Dr. Eli Fung, DO    Obstetrics and Gynecology  [facility] [place] [facility] [place]

## 2018-01-10 NOTE — MR AVS SNAPSHOT
After Visit Summary   1/10/2018    Anne Costello    MRN: [id number]           Patient Information     Date Of Birth          1985        Visit Information        Provider Department      1/10/2018 3:15 PM Eli Fung, DO [facility]        Today's Diagnoses     Prenatal care in third trimester    -  1      Care Instructions    Return every 2 weeks     Phone numbers [place]:  Day/ night [phone] ask for ob triage  Emergency:  Call labor and delivery:  [phone]    What should I call about??    Contraction every 5 minutes for 1 hour 1 minute long (921), bleeding, loss of fluid, headache that doesn't resolve with tylenol, and decreased fetal movement       [facility] Address   [address] [facility], [place] [address] [phone]    Dr. Eli Fung, DO    OB/GYN   [facility]                  Dr. Eli Fung, DO    Obstetrics and Gynecology  [facility]                 Follow-ups after your visit        Your next 10 appointments already scheduled     Feb 09, 2018  9:30 AM SAVANNA HESTER US COMPRE SINGLE F/U with [email or web address]   [facility] Maternal Fetal Medicine Ultrasound - [facility])    [address] [facility] [address]  [facility] [address] [phone]           Wear comfortable clothes and leave your valuables at home.            Feb 09, 2018 10:00 AM SAVANNA   Radiology MD with  MIR WALTER   [facility] Maternal Fetal Medicine - [facility])    [address]  [facility] [address] [phone]           Please arrive at the time given for your first appointment. This visit is used internally to schedule the physician's time during your ultrasound.              Who to contact     If you have questions or need follow up information about today's clinic visit or your schedule please contact [facility] directly at  "[phone].  Normal or non-critical lab and imaging results will be communicated to you by MyChart, letter or phone within 4 business days after the clinic has received the results. If you do not hear from us within 7 days, please contact the clinic through MyChart or phone. If you have a critical or abnormal lab result, we will notify you by phone as soon as possible.  Submit refill requests through [employer] or call your pharmacy and they will forward the refill request to us. Please allow 3 business days for your refill to be completed.          Additional Information About Your Visit        Care EveryWhere ID     This is your Care EveryWhere ID. This could be used by other organizations to access your [facility] medical records  [id number]        Your Vitals Were     Height Last Period BMI (Body Mass Index)             5' 9\" (1.753 m) 06/28/2017 (Approximate) 28.78 kg/m2          Blood Pressure from Last 3 Encounters:   01/10/18 100/58   11/28/17 100/60   11/16/17 118/70    Weight from Last 3 Encounters:   01/10/18 194 lb 14.4 oz (88.4 kg)   11/28/17 183 lb 4.8 oz (83.1 kg)   11/16/17 187 lb (84.8 kg)              We Performed the Following     Anti Treponema     CBC with platelets     Glucose tolerance, gest screen, 1 hour     TDAP VACCINE (ADACEL)        Primary Care Provider Office Phone # Fax #    [facility] [phone]       [address] [place] [address]        Equal Access to Services     MONSERRAT OCHOA : Hadii aad ku hadasho Soomaali, waaxda luqadaha, qaybta kaalmada adeegyada, waxay idiin haylexus mcgovern . So [facility] [phone].    ATENCIÓN: Si habla español, tiene a narayanan disposición servicios gratuitos de asistencia lingüística. Llame al [phone].    We comply with applicable federal civil rights laws and [place] laws. We do not discriminate on the basis of race, color, national origin, age, disability, sex, sexual orientation, or gender " identity.            Thank you!     Thank you for choosing [facility]  for your care. Our goal is always to provide you with excellent care. Hearing back from our patients is one way we can continue to improve our services. Please take a few minutes to complete the written survey that you may receive in the mail after your visit with us. Thank you!             Your Updated Medication List - Protect others around you: Learn how to safely use, store and throw away your medicines at www.disposemymeds.org.          This list is accurate as of: 1/10/18  3:31 PM.  Always use your most recent med list.                   Brand Name Dispense Instructions for use Diagnosis    prenatal multivitamin plus iron 27-0.8 MG Tabs per tablet     100 tablet    Take 1 tablet by mouth daily

## 2018-01-11 LAB — T PALLIDUM IGG+IGM SER QL: NEGATIVE

## 2018-01-12 LAB — GLUCOSE 1H P 50 G GLC PO SERPL-MCNC: 96 MG/DL (ref 60–129)

## 2018-01-30 ENCOUNTER — PRENATAL OFFICE VISIT (OUTPATIENT)
Dept: OBGYN | Facility: CLINIC | Age: 33
End: 2018-01-30
Payer: MEDICAID

## 2018-01-30 VITALS
BODY MASS INDEX: 28.35 KG/M2 | HEART RATE: 78 BPM | WEIGHT: 192 LBS | SYSTOLIC BLOOD PRESSURE: 112 MMHG | DIASTOLIC BLOOD PRESSURE: 62 MMHG

## 2018-01-30 DIAGNOSIS — Z34.83 ENCOUNTER FOR SUPERVISION OF OTHER NORMAL PREGNANCY IN THIRD TRIMESTER: ICD-10-CM

## 2018-01-30 DIAGNOSIS — D50.9 IRON DEFICIENCY ANEMIA, UNSPECIFIED IRON DEFICIENCY ANEMIA TYPE: Primary | ICD-10-CM

## 2018-01-30 PROCEDURE — 99212 OFFICE O/P EST SF 10 MIN: CPT | Performed by: ADVANCED PRACTICE MIDWIFE

## 2018-01-30 RX ORDER — FERROUS SULFATE 325(65) MG
325 TABLET ORAL
Qty: 90 TABLET | Refills: 1 | Status: ON HOLD | OUTPATIENT
Start: 2018-01-30 | End: 2018-03-27

## 2018-01-30 NOTE — PROGRESS NOTES
Anne Costello is here alone for a routine prenatal visit at 30w6d.  She has no concerns or questions today.  She is feeling fetal movement regurally. Fetal maturity and expectations for fetal movement in the third trimester.  Appetite is normal. Interval weight gain is appropriate.  TdaP already given.   Discussed how to pre-register on our website after 30 weeks. Discussed birth control options after delivery, patient  plans vasectomy.  Discussed choosing a provider for infant care, patient uses [facility] in [place] .  Anticipatory guidance, warning signs (with emphasis on  labor signs and symptoms), when to call and CNM contact information reviewed.

## 2018-01-30 NOTE — MR AVS SNAPSHOT
After Visit Summary   1/30/2018    Anne Costello    MRN: [id number]           Patient Information     Date Of Birth          1985        Visit Information        Provider Department      1/30/2018 3:30 PM Nicole Aldrich APRN CNM [facility]        Today's Diagnoses     Iron deficiency anemia, unspecified iron deficiency anemia type    -  1    Encounter for supervision of other normal pregnancy in third trimester           Follow-ups after your visit        Follow-up notes from your care team     Return in about 2 weeks (around 2/13/2018).      Your next 10 appointments already scheduled     Feb 09, 2018  9:30 AM CST   MFM US COMPRE SINGLE [facility]U [facility]   [facility] Maternal Fetal Medicine Ultrasound - [facility])    [address] [place] [address] [phone]           Wear comfortable clothes and leave your valuables at home.            Feb 09, 2018 10:00 AM CST   Radiology MD with [facility] MD   [facility] [place])    [address]  [facility] [address] [phone]           Please arrive at the time given for your first appointment. This visit is used internally to schedule the physician's time during your ultrasound.              Who to contact     If you have questions or need follow up information about today's clinic visit or your schedule please contact [facility] directly at [phone].  Normal or non-critical lab and imaging results will be communicated to you by MyChart, letter or phone within 4 business days after the clinic has received the results. If you do not hear from us within 7 days, please contact the clinic through MyChart or phone. If you have a critical or abnormal lab result, we will notify you by phone as soon as possible.  Submit refill requests through [employer] or call your pharmacy and they will forward the  refill request to us. Please allow 3 business days for your refill to be completed.          Additional Information About Your Visit        Care EveryWhere ID     This is your Care EveryWhere ID. This could be used by other organizations to access your [facility] medical records  [id number]        Your Vitals Were     Pulse Last Period Breastfeeding? BMI (Body Mass Index)          78 06/28/2017 (Approximate) No 28.35 kg/m2         Blood Pressure from Last 3 Encounters:   01/30/18 112/62   01/10/18 100/58   11/28/17 100/60    Weight from Last 3 Encounters:   01/30/18 192 lb (87.1 kg)   01/10/18 194 lb 14.4 oz (88.4 kg)   11/28/17 183 lb 4.8 oz (83.1 kg)              Today, you had the following     No orders found for display         Today's Medication Changes          These changes are accurate as of 1/30/18  3:56 PM.  If you have any questions, ask your nurse or doctor.               Start taking these medicines.        Dose/Directions    ferrous sulfate 325 (65 FE) MG tablet   Commonly known as:  IRON   Used for:  Iron deficiency anemia, unspecified iron deficiency anemia type   Started by:  Nicole Aldrich, GURDEEP HERRMANN        Dose:  325 mg   Take 1 tablet (325 mg) by mouth daily (with breakfast)   Quantity:  90 tablet   Refills:  1            Where to get your medicines      These medications were sent to [facility] [place], [place] - [address]  [address] [place] [address]     Phone:  [phone]     ferrous sulfate 325 (65 FE) MG tablet                Primary Care Provider Office Phone # Fax #    [facility] [phone]       [address] [place] [address]        Equal Access to Services     MONSERRAT OCHOA : Hadii jaz bunch Sobrianna, waaxda luqadaha, qaybta kaalmada adeegyada, basil melchor. So [facility] [phone].    ATENCIÓN: Si habla español, tiene a narayanan disposición servicios gratuitos de  asistencia lingüística. Darrion al [phone].    We comply with applicable federal civil rights laws and [place] laws. We do not discriminate on the basis of race, color, national origin, age, disability, sex, sexual orientation, or gender identity.            Thank you!     Thank you for choosing [facility]  for your care. Our goal is always to provide you with excellent care. Hearing back from our patients is one way we can continue to improve our services. Please take a few minutes to complete the written survey that you may receive in the mail after your visit with us. Thank you!             Your Updated Medication List - Protect others around you: Learn how to safely use, store and throw away your medicines at www.disposemymeds.org.          This list is accurate as of 1/30/18  3:56 PM.  Always use your most recent med list.                   Brand Name Dispense Instructions for use Diagnosis    ferrous sulfate 325 (65 FE) MG tablet    IRON    90 tablet    Take 1 tablet (325 mg) by mouth daily (with breakfast)    Iron deficiency anemia, unspecified iron deficiency anemia type       prenatal multivitamin plus iron 27-0.8 MG Tabs per tablet     100 tablet    Take 1 tablet by mouth daily

## 2018-02-09 ENCOUNTER — OFFICE VISIT (OUTPATIENT)
Dept: MATERNAL FETAL MEDICINE | Facility: CLINIC | Age: 33
End: 2018-02-09
Attending: OBSTETRICS & GYNECOLOGY
Payer: MEDICAID

## 2018-02-09 ENCOUNTER — HOSPITAL ENCOUNTER (OUTPATIENT)
Dept: ULTRASOUND IMAGING | Facility: CLINIC | Age: 33
Discharge: HOME | End: 2018-02-09
Attending: OBSTETRICS & GYNECOLOGY | Admitting: OBSTETRICS & GYNECOLOGY
Payer: MEDICAID

## 2018-02-09 DIAGNOSIS — O35.EXX0 PYELECTASIS OF FETUS ON PRENATAL ULTRASOUND: ICD-10-CM

## 2018-02-09 DIAGNOSIS — O35.EXX0 ENCOUNTER FOR REPEAT ULTRASOUND OF FETAL PYELECTASIS, ANTEPARTUM, SINGLE OR UNSPECIFIED FETUS: Primary | ICD-10-CM

## 2018-02-09 PROCEDURE — 76816 OB US FOLLOW-UP PER FETUS: CPT

## 2018-02-09 NOTE — PROGRESS NOTES
"Please see \"Imaging\" tab under \"Chart Review\" for details of today's visit.    Elías Sousa    "

## 2018-02-09 NOTE — MR AVS SNAPSHOT
After Visit Summary   2/9/2018    Anne Costello    MRN: [id number]           Patient Information     Date Of Birth          1985        Visit Information        Provider Department      2/9/2018 10:00 AM Elías Sousa MD [facility] Maternal Fetal Medicine [facility]        Today's Diagnoses     Encounter for repeat ultrasound of fetal pyelectasis, antepartum, single or unspecified fetus    -  1       Follow-ups after your visit        Your next 10 appointments already scheduled     Feb 20, 2018  9:00 AM CST   ESTABLISHED PRENATAL with GURDEEP Rodríguez CNM   [facility]    [address]  [facility] [id number] [phone]              Who to contact     If you have questions or need follow up information about today's clinic visit or your schedule please contact [facility] directly at [phone].  Normal or non-critical lab and imaging results will be communicated to you by MyChart, letter or phone within 4 business days after the clinic has received the results. If you do not hear from us within 7 days, please contact the clinic through MyChart or phone. If you have a critical or abnormal lab result, we will notify you by phone as soon as possible.  Submit refill requests through [employer] or call your pharmacy and they will forward the refill request to us. Please allow 3 business days for your refill to be completed.          Additional Information About Your Visit        Care EveryWhere ID     This is your Care EveryWhere ID. This could be used by other organizations to access your [facility] medical records  [id number]        Your Vitals Were     Last Period                   06/28/2017 (Approximate)            Blood Pressure from Last 3 Encounters:   01/30/18 112/62   01/10/18 100/58   11/28/17 100/60    Weight from Last 3 Encounters:   01/30/18 87.1 kg (192 lb)   01/10/18 88.4 kg (194 lb 14.4 oz)    11/28/17 83.1 kg (183 lb 4.8 oz)              Today, you had the following     No orders found for display       Primary Care Provider Office Phone # Fax #    [facility] [phone]       [place] [address] [facility] [address]        Equal Access to Services     MONSERRAT OCHOA : Hadii aad ku hadasho Soomaali, waaxda luqadaha, qaybta kaalmada adeegyada, waxay suhain hayaan ademarylou sowzitajustin lalori melchor. So [facility] [phone].    ATENCIÓN: Si habla español, tiene a narayanan disposición servicios gratuitos de asistencia lingüística. Darrion al [phone].    We comply with applicable federal civil rights laws and [place] laws. We do not discriminate on the basis of race, color, national origin, age, disability, sex, sexual orientation, or gender identity.            Thank you!     Thank you for choosing [facility]  for your care. Our goal is always to provide you with excellent care. Hearing back from our patients is one way we can continue to improve our services. Please take a few minutes to complete the written survey that you may receive in the mail after your visit with us. Thank you!             Your Updated Medication List - Protect others around you: Learn how to safely use, store and throw away your medicines at www.disposemymeds.org.          This list is accurate as of 2/9/18 10:15 AM.  Always use your most recent med list.                   Brand Name Dispense Instructions for use Diagnosis    ferrous sulfate 325 (65 FE) MG tablet    IRON    90 tablet    Take 1 tablet (325 mg) by mouth daily (with breakfast)    Iron deficiency anemia, unspecified iron deficiency anemia type       prenatal multivitamin plus iron 27-0.8 MG Tabs per tablet     100 tablet    Take 1 tablet by mouth daily

## 2018-02-27 ENCOUNTER — PRENATAL OFFICE VISIT (OUTPATIENT)
Dept: OBGYN | Facility: CLINIC | Age: 33
End: 2018-02-27
Payer: MEDICAID

## 2018-02-27 VITALS
DIASTOLIC BLOOD PRESSURE: 62 MMHG | SYSTOLIC BLOOD PRESSURE: 112 MMHG | HEART RATE: 76 BPM | BODY MASS INDEX: 28.21 KG/M2 | WEIGHT: 191 LBS

## 2018-02-27 DIAGNOSIS — Z34.83 ENCOUNTER FOR SUPERVISION OF OTHER NORMAL PREGNANCY IN THIRD TRIMESTER: Primary | ICD-10-CM

## 2018-02-27 PROCEDURE — 99212 OFFICE O/P EST SF 10 MIN: CPT | Performed by: ADVANCED PRACTICE MIDWIFE

## 2018-02-27 NOTE — PROGRESS NOTES
Anne Costello is here alone for a routine prenatal visit at 34w6d.  She has been feeling well.  Denies any PTL symptoms.  Plans to continue to work until delivery.  She is a  at [employer].    Reviewed [facility] follow up ultrasound.  No further scans recommended.  Baby was liane breech on scan, feels vertex by Leopold's today.   Fetal movement is normal. Interval weight gain is appropriate.   No concerns about PPD; identifies a good support system.  Reviewed Group B strep vaginal & rectal culture and hemoglobin at 36 week visit.    Anticipatory guidance, warning signs (with emphasis on  labor signs and symptoms), when to call and CNM contact information reviewed.   Return to Clinic in 1 week

## 2018-02-27 NOTE — NURSING NOTE
"Chief Complaint   Patient presents with     Prenatal Care     34 weeks 6 days- fell on ice on 2/23/2018       Initial /62 (BP Location: Right arm, Patient Position: Sitting, Cuff Size: Adult Regular)  Pulse 76  Wt 191 lb (86.6 kg)  LMP 06/28/2017 (Approximate)  BMI 28.21 kg/m2 Estimated body mass index is 28.21 kg/(m^2) as calculated from the following:    Height as of 1/10/18: 5' 9\" (1.753 m).    Weight as of this encounter: 191 lb (86.6 kg).  Medication Reconciliation: complete     +FM    34w6d  Doug Castillo, CASANDRA       "

## 2018-02-27 NOTE — MR AVS SNAPSHOT
After Visit Summary   2/27/2018    Anne Costello    MRN: [id number]           Patient Information     Date Of Birth          1985        Visit Information        Provider Department      2/27/2018 8:00 AM Nicole Aldrich APRN CNM [facility]        Today's Diagnoses     Encounter for supervision of other normal pregnancy in third trimester    -  1       Follow-ups after your visit        Follow-up notes from your care team     Return in about 1 week (around 3/6/2018).      Your next 10 appointments already scheduled     Mar 06, 2018  9:00 AM CST   ESTABLISHED PRENATAL with GURDEEP Richardson CNM   [facility]    [address]  [facility] [address]   [phone]            Mar 13, 2018  2:30 PM CDT   ESTABLISHED PRENATAL with Diane Atwood MD   [facility]    [address]  [facility] [address]   [phone]            Mar 20, 2018  8:00 AM CDT   ESTABLISHED PRENATAL with GURDEEP Rodríguez CNM   [facility]    [address]  [facility] [address]   [phone]            Mar 27, 2018  8:00 AM CDT   ESTABLISHED PRENATAL with GURDEEP Rodríguez CNM   [facility]    [address]  [facility] [address]   [phone]            Apr 03, 2018  8:30 AM CDT   ESTABLISHED PRENATAL with GURDEEP Richardson CNM   [facility]    [address]  [facility] [address]   [phone]              Who to contact     If you have questions or need follow up information about today's clinic visit or your schedule please contact [facility] directly at [phone].  Normal or non-critical lab and imaging results will be communicated to you by MyChart, letter or phone  "within 4 business days after the clinic has received the results. If you do not hear from us within 7 days, please contact the clinic through [employer] or phone. If you have a critical or abnormal lab result, we will notify you by phone as soon as possible.  Submit refill requests through [employer] or call your pharmacy and they will forward the refill request to us. Please allow 3 business days for your refill to be completed.          Additional Information About Your Visit        Social [employer] Information     [employer] lets you send messages to your doctor, view your test results, renew your prescriptions, schedule appointments and more. To sign up, go to [email or web address] . Click on \"Log in\" on the left side of the screen, which will take you to the Welcome page. Then click on \"Sign up Now\" on the right side of the page.     You will be asked to enter the access code listed below, as well as some personal information. Please follow the directions to create your username and password.     Your access code is: [email or web address]  Expires: 2018  8:20 AM     Your access code will  in 90 days. If you need help or a new code, please call your [facility] clinic or [phone].        Care EveryWhere ID     This is your Care EveryWhere ID. This could be used by other organizations to access your [facility] medical records  [id number]        Your Vitals Were     Pulse Last Period BMI (Body Mass Index)             76 2017 (Approximate) 28.21 kg/m2          Blood Pressure from Last 3 Encounters:   18 112/62   18 112/62   01/10/18 100/58    Weight from Last 3 Encounters:   18 191 lb (86.6 kg)   18 192 lb (87.1 kg)   01/10/18 194 lb 14.4 oz (88.4 kg)              Today, you had the following     No orders found for display       Primary Care Provider Office Phone # Fax #    [facility] [phone]       [address] [place] [address]        Equal Access to " Services     [facility]: Hadii jaz Chen, waangelda luqadaha, qaybta kajohnathanbasil kelly. So [facility] [phone].    ATENCIÓN: Si habla español, tiene a narayanan disposición servicios gratuitos de asistencia lingüística. Llame al [phone].    We comply with applicable federal civil rights laws and [place] laws. We do not discriminate on the basis of race, color, national origin, age, disability, sex, sexual orientation, or gender identity.            Thank you!     Thank you for choosing [facility]  for your care. Our goal is always to provide you with excellent care. Hearing back from our patients is one way we can continue to improve our services. Please take a few minutes to complete the written survey that you may receive in the mail after your visit with us. Thank you!             Your Updated Medication List - Protect others around you: Learn how to safely use, store and throw away your medicines at www.disposemymeds.org.          This list is accurate as of 2/27/18  8:20 AM.  Always use your most recent med list.                   Brand Name Dispense Instructions for use Diagnosis    ferrous sulfate 325 (65 FE) MG tablet    IRON    90 tablet    Take 1 tablet (325 mg) by mouth daily (with breakfast)    Iron deficiency anemia, unspecified iron deficiency anemia type       prenatal multivitamin plus iron 27-0.8 MG Tabs per tablet     100 tablet    Take 1 tablet by mouth daily

## 2018-03-06 ENCOUNTER — PRENATAL OFFICE VISIT (OUTPATIENT)
Dept: OBGYN | Facility: CLINIC | Age: 33
End: 2018-03-06
Payer: COMMERCIAL

## 2018-03-06 VITALS
DIASTOLIC BLOOD PRESSURE: 68 MMHG | WEIGHT: 188 LBS | SYSTOLIC BLOOD PRESSURE: 112 MMHG | BODY MASS INDEX: 27.76 KG/M2 | HEART RATE: 84 BPM

## 2018-03-06 DIAGNOSIS — Z34.83 ENCOUNTER FOR SUPERVISION OF OTHER NORMAL PREGNANCY, THIRD TRIMESTER: Primary | ICD-10-CM

## 2018-03-06 PROBLEM — Z83.49 FAMILY HISTORY OF GALACTOSEMIA: Status: ACTIVE | Noted: 2018-03-06

## 2018-03-06 PROCEDURE — 99212 OFFICE O/P EST SF 10 MIN: CPT | Performed by: ADVANCED PRACTICE MIDWIFE

## 2018-03-06 NOTE — MR AVS SNAPSHOT
After Visit Summary   3/6/2018    Anne Costello    MRN: [id number]           Patient Information     Date Of Birth          1985        Visit Information        Provider Department      3/6/2018 9:00 AM [facility]        Today's Diagnoses     Encounter for supervision of other normal pregnancy, third trimester    -  1       Follow-ups after your visit        Follow-up notes from your care team     Return in about 1 week (around 3/13/2018) for Prenatal visit.      Your next 10 appointments already scheduled     Mar 13, 2018  2:30 PM CDT   [facility]    [address]  [facility] [address]   [phone]            Mar 20, 2018  8:00 AM CDT   ESTABLISHED PRENATAL with [facility]    [address]  [facility] [address]   [phone]            Mar 27, 2018  8:00 AM CDT   ESTABLISHED PRENATAL with G[facility]DEEP [facility]    [address]  [facility] [address]   [phone]            Apr 03, 2018  8:30 AM CDT   ESTABLISHED PRENATAL [facility]    [address]  [facility] [address]   [phone]              Who to contact     If you have questions or need follow up information about today's clinic visit or your schedule please contact [facility] directly at [phone].  Normal or non-critical lab and imaging results will be communicated to you by MyChart, letter or phone within 4 business days after the clinic has received the results. If you do not hear from us within 7 days, please contact the clinic through MyChart or phone. If you have a critical or abnormal lab result,  "we will notify you by phone as soon as possible.  Submit refill requests through [employer] or call your pharmacy and they will forward the refill request to us. Please allow 3 business days for your refill to be completed.          Additional Information About Your Visit        [employer] Information     [employer] lets you send messages to your doctor, view your test results, renew your prescriptions, schedule appointments and more. To sign up, go to [email or web address] . Click on \"Log in\" on the left side of the screen, which will take you to the Welcome page. Then click on \"Sign up Now\" on the right side of the page.     You will be asked to enter the access code listed below, as well as some personal information. Please follow the directions to create your username and password.     Your access code is: [email or web address]  Expires: 2018  8:20 AM     Your access code will  in 90 days. If you need help or a new code, please call your [facility] clinic or [phone].        Care EveryWhere ID     This is your Care EveryWhere ID. This could be used by other organizations to access your [facility] medical records  [id number]        Your Vitals Were     Pulse Last Period Breastfeeding? BMI (Body Mass Index)          84 2017 (Approximate) No 27.76 kg/m2         Blood Pressure from Last 3 Encounters:   18 112/68   18 112/62   18 112/62    Weight from Last 3 Encounters:   18 188 lb (85.3 kg)   18 191 lb (86.6 kg)   18 192 lb (87.1 kg)              Today, you had the following     No orders found for display       Primary Care Provider Office Phone # Fax #    [facility] [phone]       [address] [place] [address]        Equal Access to Services     MONSERRAT OCHOA : Love Chen, waangelda luqadaha, qaybta kaalmada jeremy, basil melchor. So [facility] [phone].    ATENCIÓN: Si fredy español, " tiene a narayanan disposición servicios gratuitos de asistencia lingüística. Darrion meyer [phone].    We comply with applicable federal civil rights laws and [place] laws. We do not discriminate on the basis of race, color, national origin, age, disability, sex, sexual orientation, or gender identity.            Thank you!     Thank you for choosing [facility]  for your care. Our goal is always to provide you with excellent care. Hearing back from our patients is one way we can continue to improve our services. Please take a few minutes to complete the written survey that you may receive in the mail after your visit with us. Thank you!             Your Updated Medication List - Protect others around you: Learn how to safely use, store and throw away your medicines at www.disposemymeds.org.          This list is accurate as of 3/6/18 11:49 AM.  Always use your most recent med list.                   Brand Name Dispense Instructions for use Diagnosis    ferrous sulfate 325 (65 FE) MG tablet    IRON    90 tablet    Take 1 tablet (325 mg) by mouth daily (with breakfast)    Iron deficiency anemia, unspecified iron deficiency anemia type       prenatal multivitamin plus iron 27-0.8 MG Tabs per tablet     100 tablet    Take 1 tablet by mouth daily

## 2018-03-06 NOTE — NURSING NOTE
"Chief Complaint   Patient presents with     Prenatal Care     35 weeks 6 days- concerns of dizziness       Initial /68 (BP Location: Left arm, Patient Position: Sitting, Cuff Size: Adult Regular)  Pulse 84  Wt 188 lb (85.3 kg)  LMP 06/28/2017 (Approximate)  Breastfeeding? No  BMI 27.76 kg/m2 Estimated body mass index is 27.76 kg/(m^2) as calculated from the following:    Height as of 1/10/18: 5' 9\" (1.753 m).    Weight as of this encounter: 188 lb (85.3 kg).  Medication Reconciliation: complete     +FM  -Edema  -Contractions    35w6d  Doug Castillo CMA       "

## 2018-03-06 NOTE — PROGRESS NOTES
S:   Baby active.  Denies uterine cramping, vaginal bleeding or leaking of fluid  O: Vitals: /68 (BP Location: Left arm, Patient Position: Sitting, Cuff Size: Adult Regular)  Pulse 84  Wt 188 lb (85.3 kg)  LMP 2017 (Approximate)  Breastfeeding? No  BMI 27.76 kg/m2  BMI= Body mass index is 27.76 kg/(m^2).  Exam:  Constitutional: healthy, alert and no distress  Respiratory: respirations even and unlabored  Gastrointestinal: Abdomen soft, non-tender. Fundus measures appropriate for gestational age. Fetal heart tones heard without difficulty and within normal limits.  Uncertain position per leopold's, patient was liane breech at 2018 ultrasound. Liaen breech per BSUS confirmed with Dr. Paez at bedside.   : Deferred  Psychiatric: mentation appears normal and affect normal/bright    A: (Z34.83) Encounter for supervision of other normal pregnancy, third trimester  (primary encounter diagnosis)  - Requested Dr. Paez to verify position with bed side ultrasound.  Confirmed breech position.      Plan: Reviewed options for breech fetal position, external cephalic version vs  section.   Instructed to make a follow up appointment with OBGYN that performs ECV to further discuss if she is interested in this option.  Patient will go home to discuss with family and call with decision.  -Reviewed that  performed at 39 weeks or at onset labor. ECV at 37 weeks as recommended by Dr. Paez   -Reviewed to present to hospital if leaking of fluid or onset of labor.     - Discussed GBS screen for next visit.    -Encouraged patient to call with any questions or concerns.  -Return to clinic 1 weeks    GURDEEP No, MONTEZ

## 2018-03-07 ENCOUNTER — TELEPHONE (OUTPATIENT)
Dept: OBGYN | Facility: CLINIC | Age: 33
End: 2018-03-07

## 2018-03-07 NOTE — TELEPHONE ENCOUNTER
Patient called clinic and spoke with Antonieta. Has decided she would like to further discuss caesarean.  Patient made appointment with Dr. Atwood.  GURDEEP No, VAM

## 2018-03-13 ENCOUNTER — PRENATAL OFFICE VISIT (OUTPATIENT)
Dept: OBGYN | Facility: CLINIC | Age: 33
End: 2018-03-13
Payer: COMMERCIAL

## 2018-03-13 VITALS
SYSTOLIC BLOOD PRESSURE: 114 MMHG | DIASTOLIC BLOOD PRESSURE: 60 MMHG | HEART RATE: 76 BPM | BODY MASS INDEX: 28.4 KG/M2 | WEIGHT: 192.3 LBS

## 2018-03-13 DIAGNOSIS — Z34.83 ENCOUNTER FOR SUPERVISION OF OTHER NORMAL PREGNANCY IN THIRD TRIMESTER: Primary | ICD-10-CM

## 2018-03-13 PROCEDURE — 87653 STREP B DNA AMP PROBE: CPT | Performed by: OBSTETRICS & GYNECOLOGY

## 2018-03-13 NOTE — MR AVS SNAPSHOT
After Visit Summary   3/13/2018    Anne Costello    MRN: [id number]           Patient Information     Date Of Birth          1985        Visit Information        Provider Department      3/13/2018 2:30 PM Diane Atwood MD [facility]        Today's Diagnoses     Encounter for supervision of other normal pregnancy in third trimester    -  1    Breech presentation, single or unspecified fetus           Follow-ups after your visit        Your next 10 appointments already scheduled     Mar 20, 2018  8:00 AM CDT   ESTABLISHED PRENATAL with GURDEEP Rodríguez CNM   [facility]    [address]  [facility] [address]   [phone]            Mar 27, 2018  8:00 AM CDT   ESTABLISHED PRENATAL with GURDEEP Rodríguez CNM   [facility]    [address]  [facility] [address]   [phone]            Apr 02, 2018   Procedure with Diane Atwood MD   [facility] (--)    [address]  [facility] [address]   [phone]            Apr 03, 2018  8:30 AM CDT   [facility]    [address]  [facility] [address]   [phone]              Who to contact     If you have questions or need follow up information about today's clinic visit or your schedule please contact [facility] directly at [phone].  Normal or non-critical lab and imaging results will be communicated to you by MyChart, letter or phone within 4 business days after the clinic has received the results. If you do not hear from us within 7 days, please contact the clinic through MyChart or phone. If you have a critical or abnormal lab result, we will notify you by phone as soon as possible.  Submit refill requests through [employer]SPORThart or call your  "pharmacy and they will forward the refill request to us. Please allow 3 business days for your refill to be completed.          Additional Information About Your Visit        MyChart Information     [employer] lets you send messages to your doctor, view your test results, renew your prescriptions, schedule appointments and more. To sign up, go to [email or web address] . Click on \"Log in\" on the left side of the screen, which will take you to the Welcome page. Then click on \"Sign up Now\" on the right side of the page.     You will be asked to enter the access code listed below, as well as some personal information. Please follow the directions to create your username and password.     Your access code is: [email or web address]  Expires: 2018  9:20 AM     Your access code will  in 90 days. If you need help or a new code, please call your [facility] clinic or [phone].        Care EveryWhere ID     This is your [facility] EveryWhere ID. This could be used by other organizations to access your [facility] medical records  [id number]        Your Vitals Were     Pulse Last Period BMI (Body Mass Index)             76 2017 (Approximate) 28.4 kg/m2          Blood Pressure from Last 3 Encounters:   18 114/60   18 112/68   18 112/62    Weight from Last 3 Encounters:   18 192 lb 4.8 oz (87.2 kg)   18 188 lb (85.3 kg)   18 191 lb (86.6 kg)              We Performed the Following     Group B strep PCR     Sandie-Operative Worksheet  Section        Primary Care Provider Office Phone # Fax #    [facility] [phone]       [address] [place] [address]        Equal Access to Services     MONSERRAT OCHOA : Love Chen, randall kerr, nisha kaalmamagdy luna, basil melchor. So [facility] [phone].    ATENCIÓN: Si habla español, tiene a narayanan disposición servicios gratuitos de asistencia lingüística. Llame " al [phone].    We comply with applicable federal civil rights laws and [place] laws. We do not discriminate on the basis of race, color, national origin, age, disability, sex, sexual orientation, or gender identity.            Thank you!     Thank you for choosing [facility]  for your care. Our goal is always to provide you with excellent care. Hearing back from our patients is one way we can continue to improve our services. Please take a few minutes to complete the written survey that you may receive in the mail after your visit with us. Thank you!             Your Updated Medication List - Protect others around you: Learn how to safely use, store and throw away your medicines at www.disposemymeds.org.          This list is accurate as of 3/13/18 11:59 PM.  Always use your most recent med list.                   Brand Name Dispense Instructions for use Diagnosis    ferrous sulfate 325 (65 FE) MG tablet    IRON    90 tablet    Take 1 tablet (325 mg) by mouth daily (with breakfast)    Iron deficiency anemia, unspecified iron deficiency anemia type       prenatal multivitamin plus iron 27-0.8 MG Tabs per tablet     100 tablet    Take 1 tablet by mouth daily

## 2018-03-13 NOTE — NURSING NOTE
"Chief Complaint   Patient presents with     Prenatal Care     36 weeks 6 days, due for GBS.      Ultrasound     to recheck baby position- he was breech at last OV       Initial /60 (BP Location: Right arm, Patient Position: Chair, Cuff Size: Adult Regular)  Pulse 76  Wt 192 lb 4.8 oz (87.2 kg)  LMP 06/28/2017 (Approximate)  BMI 28.4 kg/m2 Estimated body mass index is 28.4 kg/(m^2) as calculated from the following:    Height as of 1/10/18: 5' 9\" (1.753 m).    Weight as of this encounter: 192 lb 4.8 oz (87.2 kg).  Medication Reconciliation: complete     Pradip Hogue CMA      "

## 2018-03-13 NOTE — PROGRESS NOTES
History and Physical   2018  Anne Costello  [id number]      HPI: Anne Costello is a 33 year old  at 36w6d with liane breech presentation. She is here today to discuss delivery plans and has decided to pursue  delivery if she is still breech at time of delivery. She is not interested in external cephalic version.     She states that she is feeling well today. No vaginal bleeding, no leakage of fluid, good FM.      Complications of Pregnancy:  - fetal left pyelectasis. Boy.     OBHX:   Obstetric History       T1      L1     SAB0   TAB0   Ectopic0   Multiple0   Live Births1       # Outcome Date GA Lbr Yusuf/2nd Weight Sex Delivery Anes PTL Lv   2 Current            1 Term 07/10/16 42w0d  8 lb 5 oz (3.771 kg) M  EPI  STEPHANIE      Name: Toan FigueroaHX:   Past Medical History:   Diagnosis Date     NO ACTIVE PROBLEMS        SurgicalHX:   Past Surgical History:   Procedure Laterality Date     NO HISTORY OF SURGERY         Medications:     Current Outpatient Prescriptions on File Prior to Visit:  ferrous sulfate (IRON) 325 (65 FE) MG tablet Take 1 tablet (325 mg) by mouth daily (with breakfast)   Prenatal Vit-Fe Fumarate-FA (PRENATAL MULTIVITAMIN PLUS IRON) 27-0.8 MG TABS per tablet Take 1 tablet by mouth daily     No current facility-administered medications on file prior to visit.     Allergies:  No Known Allergies    FamilyHX:  History reviewed. No pertinent family history.    SocialHX:   Social History     Social History     Marital status: Single     Spouse name: Stevan Davis     Number of children: 1     Years of education: N/A     Occupational History           Social History Main Topics     Smoking status: Former Smoker     Quit date: 10/1/2015     Smokeless tobacco: Never Used     Alcohol use No     Drug use: No     Sexual activity: Yes     Partners: Male     Other Topics Concern     None     Social History Narrative       ROS: 10-point ROS negative except as  in HPI. No personal or family history of blood clot or problems with anesthesia.     Physical Exam:  Vitals:    18 1428   BP: 114/60   BP Location: Right arm   Patient Position: Chair   Cuff Size: Adult Regular   Pulse: 76   Weight: 192 lb 4.8 oz (87.2 kg)     GEN: resting comfortably in bed, in NAD   CVS: RRR, no murmur appreciated   PULMONARY: CTAB, no increased work of breathing, no cough/wheeze   ABDOMEN: soft, gravid, non-tender, non-distended  EXTREMITIES: scant edema, non- tender to palpation  Presentation: breech by leopold's, confirmed at last visit with BSUS.     Labs:   No results found for this or any previous visit (from the past 24 hour(s)).    Lab Results   Component Value Date    ABO O 10/26/2017    RH Pos 10/26/2017    AS Neg 10/26/2017    HEPBANG Nonreactive 10/26/2017    TREPAB Negative 01/10/2018    HGB 11.4 (L) 01/10/2018       GBS Status:   pending    No results found for: PAP    A/P: Anne Costello is a 33 year old female  at 36w6d by LMP, here for OB visit and pre-op evaluation for  section.    Plan  section for breech presentation. Case request placed.     Return to clinic in 1 week for routine prenatal care.     Diane Atwood MD  OB/GYN

## 2018-03-14 LAB
GP B STREP DNA SPEC QL NAA+PROBE: NEGATIVE
SPECIMEN SOURCE: NORMAL

## 2018-03-15 ENCOUNTER — TELEPHONE (OUTPATIENT)
Dept: OBGYN | Facility: CLINIC | Age: 33
End: 2018-03-15

## 2018-03-15 NOTE — TELEPHONE ENCOUNTER
Type of surgery:  Section  Location of surgery: Other: [facility]  Date and time of surgery: 18  Surgeon: Dr. Atwood  Pre-Op Appt Date: @ prenatal appointment  Post-Op Appt Date: 18   Packet sent out: Yes  Pre-cert/Authorization completed:  Not Applicable  Date:

## 2018-03-20 ENCOUNTER — PRENATAL OFFICE VISIT (OUTPATIENT)
Dept: OBGYN | Facility: CLINIC | Age: 33
End: 2018-03-20
Payer: COMMERCIAL

## 2018-03-20 VITALS
DIASTOLIC BLOOD PRESSURE: 62 MMHG | WEIGHT: 193 LBS | SYSTOLIC BLOOD PRESSURE: 110 MMHG | BODY MASS INDEX: 28.5 KG/M2 | HEART RATE: 65 BPM

## 2018-03-20 DIAGNOSIS — Z34.83 ENCOUNTER FOR SUPERVISION OF OTHER NORMAL PREGNANCY IN THIRD TRIMESTER: Primary | ICD-10-CM

## 2018-03-20 PROCEDURE — 99212 OFFICE O/P EST SF 10 MIN: CPT | Performed by: ADVANCED PRACTICE MIDWIFE

## 2018-03-20 NOTE — MR AVS SNAPSHOT
After Visit Summary   3/20/2018    Anne Costello    MRN: [id number]           Patient Information     Date Of Birth          1985        Visit Information        Provider Department      3/20/2018 8:00 AM Nicole Aldrich APRN CNM [facility]        Today's Diagnoses     Encounter for supervision of other normal pregnancy in third trimester    -  1       Follow-ups after your visit        Follow-up notes from your care team     Return in about 1 week (around 3/27/2018).      Your next 10 appointments already scheduled     Mar 27, 2018  8:00 AM CDT   ESTABLISHED PRENATAL with GURDEEP Rodríguez CNM   [facility] ([facility])    [address]  [facility] [address]   [phone]            Apr 02, 2018   Procedure with Diane Atwood MD   [facility] (--)    [address]  [facility] [address]   [phone]            Apr 03, 2018  8:30 AM CDT   [facility]    [address]  [facility] [address]   [phone]            Apr 12, 2018  2:15 PM CDT   SHORT with Diane Atwood MD   [facility] ([facility])    [address]  [facility] [address] [phone]              Who to contact     If you have questions or need follow up information about today's clinic visit or your schedule please contact [facility] directly at [phone].  Normal or non-critical lab and imaging results will be communicated to you by MyChart, letter or phone within 4 business days after the clinic has received the results. If you do not hear from us within 7 days, please contact the clinic through MyChart or phone. If you have a critical or abnormal lab result, we will notify you by phone as soon as possible.  Submit refill requests through  "MyChart or call your pharmacy and they will forward the refill request to us. Please allow 3 business days for your refill to be completed.          Additional Information About Your Visit        MyChart Information     [employer] lets you send messages to your doctor, view your test results, renew your prescriptions, schedule appointments and more. To sign up, go to [email or web address] . Click on \"Log in\" on the left side of the screen, which will take you to the Welcome page. Then click on \"Sign up Now\" on the right side of the page.     You will be asked to enter the access code listed below, as well as some personal information. Please follow the directions to create your username and password.     Your access code is: [email or web address]  Expires: 2018  9:20 AM     Your access code will  in 90 days. If you need help or a new code, please call your [facility] clinic or [phone].        Care EveryWhere ID     This is your Care EveryWhere ID. This could be used by other organizations to access your [facility] medical records  [id number]        Your Vitals Were     Pulse Last Period Breastfeeding? BMI (Body Mass Index)          65 2017 (Approximate) No 28.5 kg/m2         Blood Pressure from Last 3 Encounters:   18 110/62   18 114/60   18 112/68    Weight from Last 3 Encounters:   18 193 lb (87.5 kg)   18 192 lb 4.8 oz (87.2 kg)   18 188 lb (85.3 kg)              Today, you had the following     No orders found for display       Primary Care Provider Office Phone # Fax #    [facility] [phone]       [address] [place] [address]        Equal Access to Services     MONSERRAT OCHOA : Love Chen, randall kerr, nisha kaalmamagdy luna, basil melchor. So [facility] [phone].    ATENCIÓN: Si habla español, tiene a narayanan disposición servicios gratuitos de asistencia lingüística. Llame " al [phone].    We comply with applicable federal civil rights laws and [place] laws. We do not discriminate on the basis of race, color, national origin, age, disability, sex, sexual orientation, or gender identity.            Thank you!     Thank you for choosing [facility]  for your care. Our goal is always to provide you with excellent care. Hearing back from our patients is one way we can continue to improve our services. Please take a few minutes to complete the written survey that you may receive in the mail after your visit with us. Thank you!             Your Updated Medication List - Protect others around you: Learn how to safely use, store and throw away your medicines at www.disposemymeds.org.          This list is accurate as of 3/20/18  8:26 AM.  Always use your most recent med list.                   Brand Name Dispense Instructions for use Diagnosis    ferrous sulfate 325 (65 FE) MG tablet    IRON    90 tablet    Take 1 tablet (325 mg) by mouth daily (with breakfast)    Iron deficiency anemia, unspecified iron deficiency anemia type       prenatal multivitamin plus iron 27-0.8 MG Tabs per tablet     100 tablet    Take 1 tablet by mouth daily

## 2018-03-20 NOTE — NURSING NOTE
"Chief Complaint   Patient presents with     Prenatal Care     37 weeks 6 days- no concerns        Initial /62 (BP Location: Right arm, Patient Position: Sitting, Cuff Size: Adult Regular)  Pulse 65  Wt 193 lb (87.5 kg)  LMP 06/28/2017 (Approximate)  Breastfeeding? No  BMI 28.5 kg/m2 Estimated body mass index is 28.5 kg/(m^2) as calculated from the following:    Height as of 1/10/18: 5' 9\" (1.753 m).    Weight as of this encounter: 193 lb (87.5 kg).  Medication Reconciliation: complete     +FM  -Edema  -Contractions    37w6d  Doug Castillo, CASANDRA       "

## 2018-03-20 NOTE — PROGRESS NOTES
Anne Costello is here alone for a routine prenatal visit at 37w6d.  She has no questions or concerns today.  States she feels as though baby is still breech.  Ultrasound in room confirmed breech position with fetal head to maternal right.  Discussed spinning babies website, chiropractor and acupuncture to facilitate fetal movement to vertex.  Fetal movement is regular. Interval weight gain is appropriate. Group B strep was negative.  Encouraged to schedule weekly visits from to/through her EDB.   Anticipatory guidance, signs of symptoms of labor or SROM, third trimester warning signs, when to call and CNM contact information reviewed.  Primary  scheduled for -18.

## 2018-03-24 ENCOUNTER — ANESTHESIA EVENT (OUTPATIENT)
Dept: SURGERY | Facility: CLINIC | Age: 33
End: 2018-03-24
Payer: COMMERCIAL

## 2018-03-24 ENCOUNTER — HOSPITAL ENCOUNTER (INPATIENT)
Facility: CLINIC | Age: 33
LOS: 3 days | Discharge: HOME HEALTH | End: 2018-03-27
Attending: OBSTETRICS & GYNECOLOGY | Admitting: OBSTETRICS & GYNECOLOGY
Payer: COMMERCIAL

## 2018-03-24 ENCOUNTER — ANESTHESIA (OUTPATIENT)
Dept: SURGERY | Facility: CLINIC | Age: 33
End: 2018-03-24
Payer: COMMERCIAL

## 2018-03-24 ENCOUNTER — SURGERY (OUTPATIENT)
Age: 33
End: 2018-03-24

## 2018-03-24 DIAGNOSIS — Z98.891 S/P C-SECTION: Primary | ICD-10-CM

## 2018-03-24 LAB — HGB BLD-MCNC: 11.2 G/DL (ref 11.7–15.7)

## 2018-03-24 PROCEDURE — 36000058 ZZH SURGERY LEVEL 3 EA 15 ADDTL MIN: Performed by: OBSTETRICS & GYNECOLOGY

## 2018-03-24 PROCEDURE — 25000132 ZZH RX MED GY IP 250 OP 250 PS 637: Performed by: OBSTETRICS & GYNECOLOGY

## 2018-03-24 PROCEDURE — 12000029 ZZH R&B OB INTERMEDIATE

## 2018-03-24 PROCEDURE — 36415 COLL VENOUS BLD VENIPUNCTURE: CPT | Performed by: OBSTETRICS & GYNECOLOGY

## 2018-03-24 PROCEDURE — 25000125 ZZHC RX 250: Performed by: NURSE ANESTHETIST, CERTIFIED REGISTERED

## 2018-03-24 PROCEDURE — 86900 BLOOD TYPING SEROLOGIC ABO: CPT | Performed by: OBSTETRICS & GYNECOLOGY

## 2018-03-24 PROCEDURE — 37000008 ZZH ANESTHESIA TECHNICAL FEE, 1ST 30 MIN: Performed by: OBSTETRICS & GYNECOLOGY

## 2018-03-24 PROCEDURE — 86780 TREPONEMA PALLIDUM: CPT | Performed by: OBSTETRICS & GYNECOLOGY

## 2018-03-24 PROCEDURE — 25000128 H RX IP 250 OP 636: Performed by: OBSTETRICS & GYNECOLOGY

## 2018-03-24 PROCEDURE — 86901 BLOOD TYPING SEROLOGIC RH(D): CPT | Performed by: OBSTETRICS & GYNECOLOGY

## 2018-03-24 PROCEDURE — 85018 HEMOGLOBIN: CPT | Performed by: OBSTETRICS & GYNECOLOGY

## 2018-03-24 PROCEDURE — 36000056 ZZH SURGERY LEVEL 3 1ST 30 MIN: Performed by: OBSTETRICS & GYNECOLOGY

## 2018-03-24 PROCEDURE — 37000009 ZZH ANESTHESIA TECHNICAL FEE, EACH ADDTL 15 MIN: Performed by: OBSTETRICS & GYNECOLOGY

## 2018-03-24 PROCEDURE — 71000012 ZZH RECOVERY PHASE 1 LEVEL 1 FIRST HR: Performed by: OBSTETRICS & GYNECOLOGY

## 2018-03-24 PROCEDURE — 27210794 ZZH OR GENERAL SUPPLY STERILE: Performed by: OBSTETRICS & GYNECOLOGY

## 2018-03-24 PROCEDURE — 25000128 H RX IP 250 OP 636: Performed by: NURSE ANESTHETIST, CERTIFIED REGISTERED

## 2018-03-24 PROCEDURE — 25000128 H RX IP 250 OP 636

## 2018-03-24 PROCEDURE — 86850 RBC ANTIBODY SCREEN: CPT | Performed by: OBSTETRICS & GYNECOLOGY

## 2018-03-24 RX ORDER — FENTANYL CITRATE 50 UG/ML
INJECTION, SOLUTION INTRAMUSCULAR; INTRAVENOUS PRN
Status: DISCONTINUED | OUTPATIENT
Start: 2018-03-24 | End: 2018-03-25

## 2018-03-24 RX ORDER — EPHEDRINE SULFATE 50 MG/ML
INJECTION, SOLUTION INTRAVENOUS PRN
Status: DISCONTINUED | OUTPATIENT
Start: 2018-03-24 | End: 2018-03-25

## 2018-03-24 RX ORDER — CITRIC ACID/SODIUM CITRATE 334-500MG
30 SOLUTION, ORAL ORAL
Status: COMPLETED | OUTPATIENT
Start: 2018-03-24 | End: 2018-03-24

## 2018-03-24 RX ORDER — CEFAZOLIN SODIUM 1 G/3ML
1 INJECTION, POWDER, FOR SOLUTION INTRAMUSCULAR; INTRAVENOUS SEE ADMIN INSTRUCTIONS
Status: DISCONTINUED | OUTPATIENT
Start: 2018-03-24 | End: 2018-03-24 | Stop reason: HOSPADM

## 2018-03-24 RX ORDER — TERBUTALINE SULFATE 1 MG/ML
INJECTION, SOLUTION SUBCUTANEOUS
Status: COMPLETED
Start: 2018-03-24 | End: 2018-03-24

## 2018-03-24 RX ORDER — SODIUM CHLORIDE, SODIUM LACTATE, POTASSIUM CHLORIDE, CALCIUM CHLORIDE 600; 310; 30; 20 MG/100ML; MG/100ML; MG/100ML; MG/100ML
INJECTION, SOLUTION INTRAVENOUS CONTINUOUS PRN
Status: DISCONTINUED | OUTPATIENT
Start: 2018-03-24 | End: 2018-03-25

## 2018-03-24 RX ORDER — SODIUM CHLORIDE, SODIUM LACTATE, POTASSIUM CHLORIDE, CALCIUM CHLORIDE 600; 310; 30; 20 MG/100ML; MG/100ML; MG/100ML; MG/100ML
INJECTION, SOLUTION INTRAVENOUS CONTINUOUS
Status: DISCONTINUED | OUTPATIENT
Start: 2018-03-24 | End: 2018-03-24 | Stop reason: HOSPADM

## 2018-03-24 RX ORDER — CEFAZOLIN SODIUM 2 G/100ML
2 INJECTION, SOLUTION INTRAVENOUS
Status: COMPLETED | OUTPATIENT
Start: 2018-03-24 | End: 2018-03-24

## 2018-03-24 RX ORDER — ONDANSETRON 2 MG/ML
INJECTION INTRAMUSCULAR; INTRAVENOUS PRN
Status: DISCONTINUED | OUTPATIENT
Start: 2018-03-24 | End: 2018-03-25

## 2018-03-24 RX ORDER — MORPHINE SULFATE 1 MG/ML
INJECTION, SOLUTION EPIDURAL; INTRATHECAL; INTRAVENOUS PRN
Status: DISCONTINUED | OUTPATIENT
Start: 2018-03-24 | End: 2018-03-25

## 2018-03-24 RX ORDER — BUPIVACAINE HYDROCHLORIDE 7.5 MG/ML
INJECTION, SOLUTION INTRASPINAL PRN
Status: DISCONTINUED | OUTPATIENT
Start: 2018-03-24 | End: 2018-03-25

## 2018-03-24 RX ORDER — DEXAMETHASONE SODIUM PHOSPHATE 4 MG/ML
INJECTION, SOLUTION INTRA-ARTICULAR; INTRALESIONAL; INTRAMUSCULAR; INTRAVENOUS; SOFT TISSUE PRN
Status: DISCONTINUED | OUTPATIENT
Start: 2018-03-24 | End: 2018-03-25

## 2018-03-24 RX ORDER — TERBUTALINE SULFATE 1 MG/ML
0.25 INJECTION, SOLUTION SUBCUTANEOUS ONCE
Status: COMPLETED | OUTPATIENT
Start: 2018-03-24 | End: 2018-03-24

## 2018-03-24 RX ADMIN — SODIUM CHLORIDE, POTASSIUM CHLORIDE, SODIUM LACTATE AND CALCIUM CHLORIDE: 600; 310; 30; 20 INJECTION, SOLUTION INTRAVENOUS at 23:15

## 2018-03-24 RX ADMIN — CEFAZOLIN SODIUM 2 G: 2 INJECTION, SOLUTION INTRAVENOUS at 22:47

## 2018-03-24 RX ADMIN — FENTANYL CITRATE 15 MCG: 50 INJECTION, SOLUTION INTRAMUSCULAR; INTRAVENOUS at 23:22

## 2018-03-24 RX ADMIN — EPHEDRINE SULFATE 5 MG: 50 INJECTION, SOLUTION INTRAVENOUS at 23:36

## 2018-03-24 RX ADMIN — DEXAMETHASONE SODIUM PHOSPHATE 8 MG: 4 INJECTION, SOLUTION INTRA-ARTICULAR; INTRALESIONAL; INTRAMUSCULAR; INTRAVENOUS; SOFT TISSUE at 23:22

## 2018-03-24 RX ADMIN — SODIUM CHLORIDE, POTASSIUM CHLORIDE, SODIUM LACTATE AND CALCIUM CHLORIDE 1000 ML: 600; 310; 30; 20 INJECTION, SOLUTION INTRAVENOUS at 22:47

## 2018-03-24 RX ADMIN — PHENYLEPHRINE HYDROCHLORIDE 100 MCG: 10 INJECTION, SOLUTION INTRAMUSCULAR; INTRAVENOUS; SUBCUTANEOUS at 23:36

## 2018-03-24 RX ADMIN — ONDANSETRON 4 MG: 2 INJECTION INTRAMUSCULAR; INTRAVENOUS at 23:22

## 2018-03-24 RX ADMIN — PHENYLEPHRINE HYDROCHLORIDE 200 MCG: 10 INJECTION, SOLUTION INTRAMUSCULAR; INTRAVENOUS; SUBCUTANEOUS at 23:31

## 2018-03-24 RX ADMIN — MIDAZOLAM 1 MG: 1 INJECTION INTRAMUSCULAR; INTRAVENOUS at 23:22

## 2018-03-24 RX ADMIN — PHENYLEPHRINE HYDROCHLORIDE 200 MCG: 10 INJECTION, SOLUTION INTRAMUSCULAR; INTRAVENOUS; SUBCUTANEOUS at 23:22

## 2018-03-24 RX ADMIN — TERBUTALINE SULFATE 0.25 MG: 1 INJECTION SUBCUTANEOUS at 23:08

## 2018-03-24 RX ADMIN — SODIUM CHLORIDE, POTASSIUM CHLORIDE, SODIUM LACTATE AND CALCIUM CHLORIDE: 600; 310; 30; 20 INJECTION, SOLUTION INTRAVENOUS at 23:11

## 2018-03-24 RX ADMIN — BUPIVACAINE HYDROCHLORIDE IN DEXTROSE 13.5 MG: 7.5 INJECTION, SOLUTION SUBARACHNOID at 23:22

## 2018-03-24 RX ADMIN — MORPHINE SULFATE 0.15 MG: 1 INJECTION EPIDURAL; INTRATHECAL; INTRAVENOUS at 23:22

## 2018-03-24 RX ADMIN — TERBUTALINE SULFATE 0.25 MG: 1 INJECTION, SOLUTION SUBCUTANEOUS at 23:08

## 2018-03-24 RX ADMIN — SODIUM CITRATE AND CITRIC ACID MONOHYDRATE 30 ML: 500; 334 SOLUTION ORAL at 22:47

## 2018-03-24 NOTE — IP AVS SNAPSHOT
[facility]    [address]    [facility] [address]    Phone:  [phone]    Fax:  [phone]                                       After Visit Summary   3/24/2018    Anne Costello    MRN: [id number]           After Visit Summary Signature Page     I have received my discharge instructions, and my questions have been answered. I have discussed any challenges I see with this plan with the nurse or doctor.    ..........................................................................................................................................  Patient/Patient Representative Signature      ..........................................................................................................................................  Patient Representative Print Name and Relationship to Patient    ..................................................               ................................................  Date                                            Time    ..........................................................................................................................................  Reviewed by Signature/Title    ...................................................              ..............................................  Date                                                            Time

## 2018-03-24 NOTE — IP AVS SNAPSHOT
MRN:[id number]                      After Visit Summary   3/24/2018    Anne Costello    MRN: [id number]           Thank you!     Thank you for choosing [facility] for your care. Our goal is always to provide you with excellent care. Hearing back from our patients is one way we can continue to improve our services. Please take a few minutes to complete the written survey that you may receive in the mail after you visit. If you would like to speak to someone directly about your visit please contact Patient Relations at [phone]. Thank you!          Patient Information     Date Of Birth          1985        Designated Caregiver       Most Recent Value    Caregiver    Will someone help with your care after discharge? no      About your hospital stay     You were admitted on:  2018 You last received care in the:  [facility]    You were discharged on:  2018        Reason for your hospital stay       Intrauterine pregnancy at term low segment transverse  section for breech presentation with uncomplicated postop course                  Who to Call     For medical emergencies, please call 911.  For non-urgent questions about your medical care, please call your primary care provider or clinic, [phone]  For questions related to your surgery, please call your surgery clinic        Attending Provider     Provider Specialty    Roney Paez MD OB/Gyn       Primary Care Provider Office Phone # Fax #    [facility] [phone]      After Care Instructions     Activity       Your activity upon discharge: No lifting > 10 lbs for 6 wks  Take your tempature twice daily for 3 days and call if > 100.4  Nothing in vagina for 6 wks  Continue taking prenatal MVI daily for 1 month            Diet       Follow this diet upon discharge: Orders Placed This Encounter      Room Service      Advance Diet as Tolerated:  Regular Diet Adult                  Follow-up Appointments     Follow-up and recommended labs and tests        Follow up with primary care provider, Dr Paez, within 2 and 6 weeks, to evaluate after surgery and for hospital follow- up. No follow up labs or test are needed.                  Your next 10 appointments already scheduled     2018  2:15 PM CDT   [facility]    [address]  [facility] [id number] [phone]              Further instructions from your care team       Postop  Birth Instructions  Lactation [phone]  [facility] [phone]    Activity       Do not lift more than 10 pounds for 6 weeks after surgery.  Ask family and friends for help when you need it.    No driving until you have stopped taking your pain medications (usually two weeks after surgery).    No heavy exercise or activity for 6 weeks.  Don't do anything that will put a strain on your surgery site.    Don't strain when using the toilet.  Your care team may prescribe a stool softener if you have problems with your bowel movements.     To care for your incision:       Keep the incision clean and dry.    Do not soak your incision in water. No swimming or hot tubs until it has fully healed. You may soak in the bathtub if the water level is below your incision.    Do not use peroxide, gel, cream, lotion, or ointment on your incision.    Adjust your clothes to avoid pressure on your surgery site (check the elastic in your underwear for example).     You may see a small amount of clear or pink drainage and this is normal.  Check with your health care provider:       If the drainage increases or has an odor.    If the incision reddens, you have swelling, or develop a rash.    If you have increased pain and the medicine we prescribed doesn't help.    If you have a fever above 100.4 F (38 C) with or without chills when  "placing thermometer under your tongue.   The area around your incision (surgery wound), will feel numb.  This is normal. The numbness should go away in less than a year.     Keep your hands clean:  Always wash your hands before touching your incision (surgery wound). This helps reduce your risk of infection. If your hands aren't dirty, you may use an alcohol hand-rub to clean your hands. Keep your nails clean and short.    Call your healthcare provider if you have any of these symptoms:       You soak a sanitary pad with blood within 1 hour, or you see blood clots larger than a golf ball.    Bleeding that lasts more than 6 weeks.    Vaginal discharge that smells bad.    Severe pain, cramping or tenderness in your lower belly area.    A need to urinate more frequently (use the toilet more often), more urgently (use the toilet very quickly), or it burns when you urinate.    Nausea and vomiting.    Redness, swelling or pain around a vein in your leg.    Problems breastfeeding or a red or painful area on your breast.    Chest pain and cough or are gasping for air.    Problems with coping with sadness, anxiety or depression. If you have concerns about hurting yourself or the baby, call your provider immediately.      You have questions or concerns after you return home.                  Pending Results     No orders found from 3/22/2018 to 3/25/2018.            Statement of Approval     Ordered          03/27/18 1251  I have reviewed and agree with all the recommendations and orders detailed in this document.  EFFECTIVE NOW     Approved and electronically signed by:  Ochoa Saini MD             Admission Information     Date & Time Provider Department Dept. Phone    3/24/2018 Roney Paez MD [facility] [phone]      Your Vitals Were     Blood Pressure Pulse Temperature Respirations Height Weight    104/68 69 98.2  F (36.8  C) (Oral) 18 1.753 m (5' 9\") 87.5 kg (193 lb)    Last Period Pulse " "Oximetry BMI (Body Mass Index)             2017 (Approximate) 96% 28.5 kg/m2         [employer] Information     [employer] lets you send messages to your doctor, view your test results, renew your prescriptions, schedule appointments and more. To sign up, go to [email or web address] . Click on \"Log in\" on the left side of the screen, which will take you to the Welcome page. Then click on \"Sign up Now\" on the right side of the page.     You will be asked to enter the access code listed below, as well as some personal information. Please follow the directions to create your username and password.     Your access code is: [email or web address]  Expires: 2018  9:20 AM     Your access code will  in 90 days. If you need help or a new code, please call your [place] clinic or [phone].        Care EveryWhere ID     This is your Care EveryWhere ID. This could be used by other organizations to access your [place] medical records  [id number]        Equal Access to Services     [facility]: Hadmary Chen, waangelda lucynthia, qaybta kaalmamagdy luna, basil mcgovern . So [facility] [phone].    ATENCIÓN: Si habla español, tiene a narayanan disposición servicios gratuitos de asistencia lingüística. Llame al [phone].    We comply with applicable federal civil rights laws and [place] laws. We do not discriminate on the basis of race, color, national origin, age, disability, sex, sexual orientation, or gender identity.               Review of your medicines      START taking        Dose / Directions    acetaminophen 325 MG tablet   Commonly known as:  TYLENOL        Dose:  650 mg   Start taking on:  3/28/2018   Take 2 tablets (650 mg) by mouth every 4 hours as needed for other (multimodal surgical pain management along with NSAIDS and opioid medication as indicated based on pain control and physical function.)   Quantity:  100 tablet   Refills:  0       ibuprofen 400 MG tablet "   Commonly known as:  ADVIL/MOTRIN        Dose:  400 mg   Take 1 tablet (400 mg) by mouth every 6 hours as needed for other (cramping)   Quantity:  120 tablet   Refills:  0       oxyCODONE IR 5 MG tablet   Commonly known as:  ROXICODONE        Dose:  5-10 mg   Take 1-2 tablets (5-10 mg) by mouth every 3 hours as needed for other (pain control or improvement in physical function. Hold dose for analgesic side effects.)   Quantity:  14 tablet   Refills:  0         CONTINUE these medicines which have NOT CHANGED        Dose / Directions    prenatal multivitamin plus iron 27-0.8 MG Tabs per tablet        Dose:  1 tablet   Take 1 tablet by mouth daily   Quantity:  100 tablet   Refills:  3         STOP taking     ferrous sulfate 325 (65 FE) MG tablet   Commonly known as:  IRON                Where to get your medicines      Some of these will need a paper prescription and others can be bought over the counter. Ask your nurse if you have questions.     Bring a paper prescription for each of these medications     oxyCODONE IR 5 MG tablet       You don't need a prescription for these medications     acetaminophen 325 MG tablet    ibuprofen 400 MG tablet                Protect others around you: Learn how to safely use, store and throw away your medicines at www.disposemymeds.org.        Information about OPIOIDS     PRESCRIPTION OPIOIDS: WHAT YOU NEED TO KNOW    Prescription opioids can be used to help relieve moderate to severe pain and are often prescribed following a surgery or injury, or for certain health conditions. These medications can be an important part of treatment but also come with serious risks. It is important to work with your health care provider to make sure you are getting the safest, most effective care.    WHAT ARE THE RISKS AND SIDE EFFECTS OF OPIOID USE?  Prescription opioids carry serious risks of addiction and overdose, especially with prolonged use. An opioid overdose, often marked by slowed  breathing can cause sudden death. The use of prescription opioids can have a number of side effects as well, even when taken as directed:      Tolerance - meaning you might need to take more of a medication for the same pain relief    Physical dependence - meaning you have symptoms of withdrawal when a medication is stopped    Increased sensitivity to pain    Constipation    Nausea, vomiting, and dry mouth    Sleepiness and dizziness    Confusion    Depression    Low levels of testosterone that can result in lower sex drive, energy, and strength    Itching and sweating    RISKS ARE GREATER WITH:    History of drug misuse, substance use disorder, or overdose    Mental health conditions (such as depression or anxiety)    Sleep apnea    Older age (65 years or older)    Pregnancy    Avoid alcohol while taking prescription opioids.   Also, unless specifically advised by your health care provider, medications to avoid include:    Benzodiazepines (such as Xanax or Valium)    Muscle relaxants (such as Soma or Flexeril)    Hypnotics (such as Ambien or Lunesta)    Other prescription opioids    KNOW YOUR OPTIONS:  Talk to your health care provider about ways to manage your pain that do not involve prescription opioids. Some of these options may actually work better and have fewer risks and side effects:    Pain relievers such as acetaminophen, ibuprofen, and naproxen    Some medications that are also used for depression or seizures    Physical therapy and exercise    Cognitive behavioral therapy, a psychological, goal-directed approach, in which patients learn how to modify physical, behavioral, and emotional triggers of pain and stress    IF YOU ARE PRESCRIBED OPIOIDS FOR PAIN:    Never take opioids in greater amounts or more often than prescribed    Follow up with your primary health care provider and work together to create a plan on how to manage your pain.    Talk about ways to help manage your pain that do not involve  prescription opioids    Talk about all concerns and side effects    Help prevent misuse and abuse    Never sell or share prescription opioids    Never use another person's prescription opioids    Store prescription opioids in a secure place and out of reach of others (this may include visitors, children, friends, and family)    Visit www.cdc.gov/drugoverdose to learn about risks of opioid abuse and overdose    If you believe you may be struggling with addiction, tell your health care provider and ask for guidance or call [facility]'s National Helpline at 3-229-884-HELP    LEARN MORE / www.cdc.gov/drugoverdose/prescribing/guideline.html    Safely dispose of unused prescription opioids: Find your local drug take-back programs and more information about the importance of safe disposal at www.doseofreality.[place].gov             Medication List: This is a list of all your medications and when to take them. Check marks below indicate your daily home schedule. Keep this list as a reference.      Medications           Morning Afternoon Evening Bedtime As Needed    acetaminophen 325 MG tablet   Commonly known as:  TYLENOL   Take 2 tablets (650 mg) by mouth every 4 hours as needed for other (multimodal surgical pain management along with NSAIDS and opioid medication as indicated based on pain control and physical function.)   Start taking on:  3/28/2018   Last time this was given:  975 mg on 3/27/2018 11:41 AM                                ibuprofen 400 MG tablet   Commonly known as:  ADVIL/MOTRIN   Take 1 tablet (400 mg) by mouth every 6 hours as needed for other (cramping)   Last time this was given:  800 mg on 3/27/2018 10:49 AM                                oxyCODONE IR 5 MG tablet   Commonly known as:  ROXICODONE   Take 1-2 tablets (5-10 mg) by mouth every 3 hours as needed for other (pain control or improvement in physical function. Hold dose for analgesic side effects.)   Last time this was given:  5 mg on 3/27/2018 12:59  PM                                prenatal multivitamin plus iron 27-0.8 MG Tabs per tablet   Take 1 tablet by mouth daily   Last time this was given:  1 tablet on 3/27/2018 10:49 AM

## 2018-03-25 PROBLEM — Z98.891 S/P C-SECTION: Status: ACTIVE | Noted: 2018-03-25

## 2018-03-25 LAB
ABO + RH BLD: NORMAL
ABO + RH BLD: NORMAL
BLD GP AB SCN SERPL QL: NORMAL
BLOOD BANK CMNT PATIENT-IMP: NORMAL
SPECIMEN EXP DATE BLD: NORMAL
T PALLIDUM IGG+IGM SER QL: NEGATIVE

## 2018-03-25 PROCEDURE — 25000125 ZZHC RX 250: Performed by: OBSTETRICS & GYNECOLOGY

## 2018-03-25 PROCEDURE — 25000128 H RX IP 250 OP 636: Performed by: OBSTETRICS & GYNECOLOGY

## 2018-03-25 PROCEDURE — 25000128 H RX IP 250 OP 636: Performed by: NURSE ANESTHETIST, CERTIFIED REGISTERED

## 2018-03-25 PROCEDURE — 25000125 ZZHC RX 250: Performed by: NURSE ANESTHETIST, CERTIFIED REGISTERED

## 2018-03-25 PROCEDURE — 25000132 ZZH RX MED GY IP 250 OP 250 PS 637: Performed by: OBSTETRICS & GYNECOLOGY

## 2018-03-25 PROCEDURE — 27210995 ZZH RX 272: Performed by: OBSTETRICS & GYNECOLOGY

## 2018-03-25 PROCEDURE — 12000029 ZZH R&B OB INTERMEDIATE

## 2018-03-25 PROCEDURE — 59515 CESAREAN DELIVERY: CPT | Performed by: OBSTETRICS & GYNECOLOGY

## 2018-03-25 RX ORDER — DIPHENHYDRAMINE HYDROCHLORIDE 50 MG/ML
25 INJECTION INTRAMUSCULAR; INTRAVENOUS EVERY 6 HOURS PRN
Status: DISCONTINUED | OUTPATIENT
Start: 2018-03-25 | End: 2018-03-27 | Stop reason: HOSPADM

## 2018-03-25 RX ORDER — OXYTOCIN/0.9 % SODIUM CHLORIDE 30/500 ML
340 PLASTIC BAG, INJECTION (ML) INTRAVENOUS CONTINUOUS PRN
Status: DISCONTINUED | OUTPATIENT
Start: 2018-03-25 | End: 2018-03-27 | Stop reason: HOSPADM

## 2018-03-25 RX ORDER — OXYTOCIN/0.9 % SODIUM CHLORIDE 30/500 ML
100 PLASTIC BAG, INJECTION (ML) INTRAVENOUS CONTINUOUS
Status: DISCONTINUED | OUTPATIENT
Start: 2018-03-25 | End: 2018-03-27 | Stop reason: HOSPADM

## 2018-03-25 RX ORDER — DIPHENHYDRAMINE HCL 25 MG
25 CAPSULE ORAL EVERY 6 HOURS PRN
Status: DISCONTINUED | OUTPATIENT
Start: 2018-03-25 | End: 2018-03-27 | Stop reason: HOSPADM

## 2018-03-25 RX ORDER — MISOPROSTOL 200 UG/1
400 TABLET ORAL
Status: DISCONTINUED | OUTPATIENT
Start: 2018-03-25 | End: 2018-03-27 | Stop reason: HOSPADM

## 2018-03-25 RX ORDER — IBUPROFEN 600 MG/1
600 TABLET, FILM COATED ORAL EVERY 6 HOURS PRN
Status: DISCONTINUED | OUTPATIENT
Start: 2018-03-25 | End: 2018-03-27 | Stop reason: HOSPADM

## 2018-03-25 RX ORDER — PRENATAL VIT/IRON FUM/FOLIC AC 27MG-0.8MG
1 TABLET ORAL DAILY
Status: DISCONTINUED | OUTPATIENT
Start: 2018-03-25 | End: 2018-03-27 | Stop reason: HOSPADM

## 2018-03-25 RX ORDER — OXYTOCIN/0.9 % SODIUM CHLORIDE 30/500 ML
PLASTIC BAG, INJECTION (ML) INTRAVENOUS PRN
Status: DISCONTINUED | OUTPATIENT
Start: 2018-03-25 | End: 2018-03-25

## 2018-03-25 RX ORDER — ACETAMINOPHEN 325 MG/1
975 TABLET ORAL EVERY 8 HOURS
Status: DISCONTINUED | OUTPATIENT
Start: 2018-03-25 | End: 2018-03-27 | Stop reason: HOSPADM

## 2018-03-25 RX ORDER — IBUPROFEN 400 MG/1
400 TABLET, FILM COATED ORAL EVERY 6 HOURS PRN
Status: DISCONTINUED | OUTPATIENT
Start: 2018-03-25 | End: 2018-03-27 | Stop reason: HOSPADM

## 2018-03-25 RX ORDER — LANOLIN 100 %
OINTMENT (GRAM) TOPICAL
Status: DISCONTINUED | OUTPATIENT
Start: 2018-03-25 | End: 2018-03-27 | Stop reason: HOSPADM

## 2018-03-25 RX ORDER — ACETAMINOPHEN 325 MG/1
650 TABLET ORAL EVERY 4 HOURS PRN
Status: DISCONTINUED | OUTPATIENT
Start: 2018-03-28 | End: 2018-03-27 | Stop reason: HOSPADM

## 2018-03-25 RX ORDER — AMOXICILLIN 250 MG
2 CAPSULE ORAL 2 TIMES DAILY PRN
Status: DISCONTINUED | OUTPATIENT
Start: 2018-03-25 | End: 2018-03-27 | Stop reason: HOSPADM

## 2018-03-25 RX ORDER — METOCLOPRAMIDE HYDROCHLORIDE 5 MG/ML
10 INJECTION INTRAMUSCULAR; INTRAVENOUS EVERY 6 HOURS PRN
Status: DISCONTINUED | OUTPATIENT
Start: 2018-03-25 | End: 2018-03-27 | Stop reason: HOSPADM

## 2018-03-25 RX ORDER — SIMETHICONE 80 MG
80 TABLET,CHEWABLE ORAL 4 TIMES DAILY PRN
Status: DISCONTINUED | OUTPATIENT
Start: 2018-03-25 | End: 2018-03-27 | Stop reason: HOSPADM

## 2018-03-25 RX ORDER — DEXTROSE, SODIUM CHLORIDE, SODIUM LACTATE, POTASSIUM CHLORIDE, AND CALCIUM CHLORIDE 5; .6; .31; .03; .02 G/100ML; G/100ML; G/100ML; G/100ML; G/100ML
INJECTION, SOLUTION INTRAVENOUS CONTINUOUS
Status: DISCONTINUED | OUTPATIENT
Start: 2018-03-25 | End: 2018-03-27 | Stop reason: HOSPADM

## 2018-03-25 RX ORDER — OXYCODONE HYDROCHLORIDE 5 MG/1
5-10 TABLET ORAL
Status: DISCONTINUED | OUTPATIENT
Start: 2018-03-25 | End: 2018-03-27 | Stop reason: HOSPADM

## 2018-03-25 RX ORDER — LIDOCAINE 40 MG/G
CREAM TOPICAL
Status: DISCONTINUED | OUTPATIENT
Start: 2018-03-25 | End: 2018-03-27 | Stop reason: HOSPADM

## 2018-03-25 RX ORDER — MAGNESIUM HYDROXIDE 1200 MG/15ML
LIQUID ORAL PRN
Status: DISCONTINUED | OUTPATIENT
Start: 2018-03-25 | End: 2018-03-25

## 2018-03-25 RX ORDER — KETOROLAC TROMETHAMINE 30 MG/ML
30 INJECTION, SOLUTION INTRAMUSCULAR; INTRAVENOUS EVERY 6 HOURS
Status: COMPLETED | OUTPATIENT
Start: 2018-03-25 | End: 2018-03-25

## 2018-03-25 RX ORDER — AMOXICILLIN 250 MG
1 CAPSULE ORAL 2 TIMES DAILY PRN
Status: DISCONTINUED | OUTPATIENT
Start: 2018-03-25 | End: 2018-03-27 | Stop reason: HOSPADM

## 2018-03-25 RX ORDER — HYDROMORPHONE HYDROCHLORIDE 1 MG/ML
.3-.5 INJECTION, SOLUTION INTRAMUSCULAR; INTRAVENOUS; SUBCUTANEOUS EVERY 30 MIN PRN
Status: DISCONTINUED | OUTPATIENT
Start: 2018-03-25 | End: 2018-03-27 | Stop reason: HOSPADM

## 2018-03-25 RX ORDER — NALOXONE HYDROCHLORIDE 0.4 MG/ML
.1-.4 INJECTION, SOLUTION INTRAMUSCULAR; INTRAVENOUS; SUBCUTANEOUS
Status: DISCONTINUED | OUTPATIENT
Start: 2018-03-25 | End: 2018-03-27 | Stop reason: HOSPADM

## 2018-03-25 RX ORDER — HYDROCORTISONE 2.5 %
CREAM (GRAM) TOPICAL 3 TIMES DAILY PRN
Status: DISCONTINUED | OUTPATIENT
Start: 2018-03-25 | End: 2018-03-27 | Stop reason: HOSPADM

## 2018-03-25 RX ORDER — IBUPROFEN 800 MG/1
800 TABLET, FILM COATED ORAL EVERY 6 HOURS PRN
Status: DISCONTINUED | OUTPATIENT
Start: 2018-03-25 | End: 2018-03-27 | Stop reason: HOSPADM

## 2018-03-25 RX ORDER — BISACODYL 10 MG
10 SUPPOSITORY, RECTAL RECTAL DAILY PRN
Status: DISCONTINUED | OUTPATIENT
Start: 2018-03-27 | End: 2018-03-27 | Stop reason: HOSPADM

## 2018-03-25 RX ORDER — OXYTOCIN 10 [USP'U]/ML
10 INJECTION, SOLUTION INTRAMUSCULAR; INTRAVENOUS
Status: DISCONTINUED | OUTPATIENT
Start: 2018-03-25 | End: 2018-03-27 | Stop reason: HOSPADM

## 2018-03-25 RX ORDER — PROCHLORPERAZINE 25 MG
25 SUPPOSITORY, RECTAL RECTAL EVERY 12 HOURS PRN
Status: DISCONTINUED | OUTPATIENT
Start: 2018-03-25 | End: 2018-03-27 | Stop reason: HOSPADM

## 2018-03-25 RX ORDER — ONDANSETRON 2 MG/ML
4 INJECTION INTRAMUSCULAR; INTRAVENOUS EVERY 6 HOURS PRN
Status: DISCONTINUED | OUTPATIENT
Start: 2018-03-25 | End: 2018-03-27 | Stop reason: HOSPADM

## 2018-03-25 RX ADMIN — ACETAMINOPHEN 975 MG: 325 TABLET, FILM COATED ORAL at 01:36

## 2018-03-25 RX ADMIN — ACETAMINOPHEN 975 MG: 325 TABLET, FILM COATED ORAL at 09:33

## 2018-03-25 RX ADMIN — KETOROLAC TROMETHAMINE 30 MG: 30 INJECTION, SOLUTION INTRAMUSCULAR at 13:48

## 2018-03-25 RX ADMIN — KETOROLAC TROMETHAMINE 30 MG: 30 INJECTION, SOLUTION INTRAMUSCULAR at 20:04

## 2018-03-25 RX ADMIN — SODIUM CHLORIDE 1000 ML: 900 IRRIGANT IRRIGATION at 00:05

## 2018-03-25 RX ADMIN — KETOROLAC TROMETHAMINE 30 MG: 30 INJECTION, SOLUTION INTRAMUSCULAR at 01:36

## 2018-03-25 RX ADMIN — OXYTOCIN-SODIUM CHLORIDE 0.9% IV SOLN 30 UNIT/500ML 100 ML/HR: 30-0.9/5 SOLUTION at 03:11

## 2018-03-25 RX ADMIN — SODIUM CHLORIDE, POTASSIUM CHLORIDE, SODIUM LACTATE AND CALCIUM CHLORIDE: 600; 310; 30; 20 INJECTION, SOLUTION INTRAVENOUS at 00:39

## 2018-03-25 RX ADMIN — KETOROLAC TROMETHAMINE 30 MG: 30 INJECTION, SOLUTION INTRAMUSCULAR at 07:36

## 2018-03-25 RX ADMIN — DIPHENHYDRAMINE HYDROCHLORIDE 25 MG: 50 INJECTION, SOLUTION INTRAMUSCULAR; INTRAVENOUS at 03:12

## 2018-03-25 RX ADMIN — SENNOSIDES AND DOCUSATE SODIUM 1 TABLET: 8.6; 5 TABLET ORAL at 20:20

## 2018-03-25 RX ADMIN — PRENATAL VIT W/ FE FUMARATE-FA TAB 27-0.8 MG 1 TABLET: 27-0.8 TAB at 09:33

## 2018-03-25 RX ADMIN — OXYTOCIN-SODIUM CHLORIDE 0.9% IV SOLN 30 UNIT/500ML 100 ML/HR: 30-0.9/5 SOLUTION at 00:40

## 2018-03-25 RX ADMIN — SODIUM CHLORIDE, POTASSIUM CHLORIDE, SODIUM LACTATE AND CALCIUM CHLORIDE 1000 ML: 600; 310; 30; 20 INJECTION, SOLUTION INTRAVENOUS at 09:20

## 2018-03-25 RX ADMIN — ACETAMINOPHEN 975 MG: 325 TABLET, FILM COATED ORAL at 17:44

## 2018-03-25 RX ADMIN — OXYTOCIN-SODIUM CHLORIDE 0.9% IV SOLN 30 UNIT/500ML 250 ML: 30-0.9/5 SOLUTION at 00:30

## 2018-03-25 NOTE — OP NOTE
[facility]  Obstetrics Brief Operative Note    Pre-operative diagnosis: breech   Post-operative diagnosis: Same   Procedure: Primary low transverse  section   Surgeon: Roney Paez MD   Assistant(s): None   Anesthesia: Spinal anesthesia   Estimated blood loss: 600ml   Total IV fluids: (See anesthesia record)   Blood transfusion: No transfusion was given during surgery   Total urine output: (See anesthesia record)   Drains: None   Specimens: none   Findings: Live   Male   Complications: None   Condition: Infant stable, transferred to general care nursery  Mother stable, transfered to post-anesthesia recovery   Comments: See dictated operative report for full details

## 2018-03-25 NOTE — ANESTHESIA CARE TRANSFER NOTE
Patient: Anne Costello    Procedure(s):   Section - Wound Class: II-Clean Contaminated    Diagnosis: breech  Diagnosis Additional Information: Primary C/S for breech presentation    Anesthesia Type:   Spinal     Note:  Airway :Room Air  Patient transferred to:Labor and Delivery  Comments: Pt awake vss exchanging well report to rnHandoff Report: Identifed the Patient, Identified the Reponsible Provider, Reviewed the pertinent medical history, Discussed the surgical course, Reviewed Intra-OP anesthesia mangement and issues during anesthesia, Set expectations for post-procedure period and Allowed opportunity for questions and acknowledgement of understanding      Vitals: (Last set prior to Anesthesia Care Transfer)    CRNA VITALS  3/25/2018 0000 - 3/25/2018 0041      3/25/2018             Pulse: 78    SpO2: 100 %                Electronically Signed By: GURDEEP George CRNA  2018  12:41 AM

## 2018-03-25 NOTE — PLAN OF CARE
Data: Anne Costello transferred to 442 via cart at 0200. Baby transferred via parent's arms.  Action: Receiving unit notified of transfer: Yes. Patient and family notified of room change. Report given to CUCA Patterson at 0215. Belongings sent to receiving unit. Accompanied by Registered Nurse. Oriented patient to surroundings. Call light within reach. ID bands double-checked with student RN.  Response: Patient tolerated transfer and is stable.

## 2018-03-25 NOTE — PLAN OF CARE
Problem: Patient Care Overview  Goal: Plan of Care/Patient Progress Review  Outcome: Improving  Stable patient meeting expected goals. Pain being managed with toradol and tylenol. Island dressing covering incision, no new drainage. Adequate urine output, saline locked, cross removed. Up and to the bathroom without difficulty. Able to ambulate independently. Breastfeeding infant.

## 2018-03-25 NOTE — PROVIDER NOTIFICATION
18 2255   Provider Notification   Provider Name/Title Dr. Paez   Method of Notification At Bedside   Request Evaluate in Person   Notification Reason Patient Arrived   Dr. Paez performed bedside U/S, confirmed baby is in breech presentation.  Dr. Paez discussed risks and benefits, patient agreed to precede with primary  section.  VO received for terbutaline.

## 2018-03-25 NOTE — PROVIDER NOTIFICATION
03/24/18 2234   Provider Notification   Provider Name/Title Dr. chavez   Method of Notification Phone   Patient arrived to labor and delivery for rupture of membranes and breech presentation.     Dr. Chavez on his way to the hospital.    MDA being paged for primary c section

## 2018-03-25 NOTE — H&P
"  2018    Anne Costello  [id number]            OB Admit History & Physical/Pre-op      Ms. Costello  is here with SROM.      -noted to be breech presentation    She has noticed contractions and ROM    Patient's last menstrual period was 2017 (approximate).   Her Estimated Date of Delivery: 2018  , making her 38w3d  wks.      Estimated body mass index is 28.5 kg/(m^2) as calculated from the following:    Height as of this encounter: 1.753 m (5' 9\").    Weight as of this encounter: 87.5 kg (193 lb).  Her prenatal course has been uncomplicated.      Estimated fetal weight= 7 lb       She is a 33 year old   Her OB history:   Obstetric History       T1      L1     SAB0   TAB0   Ectopic0   Multiple0   Live Births1       # Outcome Date GA Lbr Yusuf/2nd Weight Sex Delivery Anes PTL Lv   2 Current            1 Term 07/10/16 42w0d  3.771 kg (8 lb 5 oz) M  EPI  STEPHANIE      Name: Toan               Past Medical History:   Diagnosis Date     NO ACTIVE PROBLEMS           Past Surgical History:   Procedure Laterality Date     NO HISTORY OF SURGERY       TONSILLECTOMY           No current outpatient prescriptions on file.       Allergies: Review of patient's allergies indicates no known allergies.      REVIEW OF SYSTEMS:  NEUROLOGIC:  Negative  EYES:  Negative  ENT:  Negative  GI:  Negative  BREAST:  Negative  :  Negative  GYN:  Negative  CV:  Negative  PULMONARY:  Negative  MUSCULOSKELETAL:  Negative  PSYCH:  Negative        Social History     Social History     Marital status: Single     Spouse name: Stevan Davis     Number of children: 1     Years of education: N/A     Occupational History           Social History Main Topics     Smoking status: Former Smoker     Quit date: 10/1/2015     Smokeless tobacco: Never Used     Alcohol use No     Drug use: No     Sexual activity: Yes     Partners: Male     Other Topics Concern     Not on file     Social History Narrative      No family history " on file.          Vitals:     With contractions every  3 min    Alert Awake in NAD  HEENT grossly normal  Neck: no lymphadenopathy or thryoidomegaly  Lungs lungs  Back no spinal or CVAT  Heart RR  ABD gravid, term on exam with breech palpable  Pelvic:  clear fluid noted, no blood noted  Cervix is 5 cm / 90 % effaced at -3 station (per nurse exam)  EXT:  no edema or calf tenderness  Neuro:  intact    Ultrasound; breech presentation with fetal head in RUQ    Assessment:  IUP at 38w3d  In labor with SROM     -breech presentation    Plan:  Primary C/S     -Risks, benefits, alternatives discussed; including but not limited to risk of infection, bleeding, damage to bowel/bladder and any other intra-abdominal viscera.  Risks also include risks of anesthesia and blood transfusion. Patient understands and agrees to planned procedure.    [unfilled]      Roney Paez MD  Dept of OB/GYN  March 24, 2018

## 2018-03-25 NOTE — PLAN OF CARE
Data: Patient presented to [facility]: 3/24/2018 10:31 PM.  Reason for maternal/fetal assessment is leaking vaginal fluid. Patient reports a large gush of fluid around 2200 this evening.  Patient states she has felt mild irreguar contractions since yesterday, but denies anything painful or regular.  Patient is a .  Prenatal record reviewed. Pregnancy  has been complicated by breech presentation.  Gestational Age 38w3d. VSS. Fetal movement present. Patient denies vaginal bleeding, nausea, vomiting, headache, visual disturbances, epigastric or URQ pain, significant edema. Support person is present.   Action: Verbal consent for EFM. Triage assessment completed. Bill of rights reviewed.  Response: Patient verbalized agreement with plan. Will contact Dr Roney Paez with update and further orders.

## 2018-03-25 NOTE — PLAN OF CARE
Problem: Patient Care Overview  Goal: Plan of Care/Patient Progress Review  Outcome: Improving  VSS.  Pain well controlled with toradol and tylenol.  Fundal checks WNL.  Incision with scant drainage, marked-no change.  Adequate UOP.  Independent with breastfeeding, good latch observed.  FOB present overnight.  Continue to monitor.

## 2018-03-25 NOTE — ANESTHESIA POSTPROCEDURE EVALUATION
Patient: Anne Costello    Procedure(s):   Section - Wound Class: II-Clean Contaminated    Diagnosis:breech  Diagnosis Additional Information: Primary C/S for breech presentation    Anesthesia Type:  Spinal    Note:  Anesthesia Post Evaluation    Patient location during evaluation: Bedside  Patient participation: Able to participate in evaluation but full recovery from regional anesthesia has not yet ocurrred but is anticipated to occur within 48 hours  Level of consciousness: awake  Pain management: adequate  Airway patency: patent  Cardiovascular status: acceptable  Respiratory status: acceptable  Hydration status: acceptable  PONV: controlled             Last vitals:  Vitals:    18 0040 18 0045 18 0050   BP: 114/55 107/60 105/56   Pulse:      Resp: 16     Temp: 97.4  F (36.3  C)     SpO2: 96% 93% 95%         Electronically Signed By: Devin Ruano MD  2018  1:02 AM

## 2018-03-25 NOTE — OP NOTE
Procedure Date: 2018      DATE OF SURGERY: 2018      PREOPERATIVE DIAGNOSES:     1.  Intrauterine pregnancy at 38-3/7 weeks.   2.  Spontaneous rupture of membranes.     3.  In labor.    4.  Breech presentation.      POSTOPERATIVE DIAGNOSES:   1.  Intrauterine pregnancy at 38-3/7 weeks.   2.  Spontaneous rupture of membranes.   3.  In labor.   4.  Breech presentation.      PROCEDURE:  Primary low transverse  section.      SURGEON:  Roney Paez MD      ESTIMATED BLOOD LOSS:  600 mL       COMPLICATIONS:  None apparent.      ANESTHESIA:  Spinal block.      INDICATIONS:  The patient is a 33-year-old  2, para 1 patient who presents to Labor and Delivery with ruptured membranes and in labor.  On exam, she is found to have a breech presentation which was confirmed sonographically.  After explanation of the risks, benefits and alternatives, she gives written and verbal consent for primary .  These risks include, but are not limited to infection, bleeding, damage to bowel, bladder or any other intraabdominal viscera, as well as all risks attendant to anesthesia and blood transfusion. With consent, she is taken to the operating suite.      DESCRIPTION OF PROCEDURE:  The perineum and abdomen were prepped and draped in sterile fashion after a spinal anesthesia is achieved. Gutiérrez catheter is placed.  A Pfannenstiel skin incision was made. Entry into the peritoneal cavity was without incident.  Bladder flap was reflected caudad and uterus was scored transversely with a knife.  Entry is made with a Arlen clamp and then the incision finger-fractured transversely.  The breech was delivered, keeping the fetal head flexed, without difficulty.  The nasal oropharynx was suctioned with bulb suction.  After a 1-minute delay, the cord was clamped, cut and then handed to the nurse in attendance.  A living male, weight and Apgars yet to be assigned, is delivered.  The placenta was delivered manually and  the uterine cavity wiped free of debris.  The uterus was closed with 2 layers of #1 chromic suture, the first layer imbricated by the second.  Retrouterine space, paracolic gutters and subvesical space were irrigated copiously. When hemostasis had been secured, the bladder flap was reapproximated with 2-0 Monocryl.  The parietoperitoneum was then closed with 2-0 Monocryl.  Subfascial space was irrigated copiously and with securement of hemostasis, it was closed in a running fashion with 0 Vicryl sutures.  The subcutaneous space was irrigated copiously and subcutaneous space was closed with 2-0 plain suture.  Skin edges were reapproximated with skin clips.  There were no intraoperative complications.  Sponge and needle counts were correct. The mother and  went to recovery and  nursery, respectively.         EV CONNOLLY MD             D: 2018   T: 2018   MT:       Name:     YFN ARGUETA   MRN:      [id number]        Account:        [id number]   :      1985           Procedure Date: 2018      Document: [id number]

## 2018-03-25 NOTE — PROGRESS NOTES
Courtesy Round: patient very happy with birth experience and feeling well. Patient is sniggled in bed with baby. Denies pain. Normal postpartum bleeding. Breastfeeding well initiated. Physicians will follow her postpartum course and discharge her. Midwives will continue courtesy rounds. Isis Forte CNM

## 2018-03-25 NOTE — ANESTHESIA PREPROCEDURE EVALUATION
PAC NOTE:       ANESTHESIA PRE EVALUATION:  Anesthesia Evaluation     .             ROS/MED HX    ENT/Pulmonary:  - neg pulmonary ROS     Neurologic:  - neg neurologic ROS     Cardiovascular:  - neg cardiovascular ROS       METS/Exercise Tolerance:     Hematologic:  - neg hematologic  ROS       Musculoskeletal:  - neg musculoskeletal ROS       GI/Hepatic:  - neg GI/hepatic ROS       Renal/Genitourinary:  - ROS Renal section negative       Endo:  - neg endo ROS       Psychiatric:  - neg psychiatric ROS       Infectious Disease:  - neg infectious disease ROS       Malignancy:      - no malignancy   Other: Comment: Breech presentation in labor for emergent C/S   (+) Possibly pregnant                    Physical Exam  Normal systems: cardiovascular, pulmonary and dental    Airway   Mallampati: II  TM distance: >3 FB  Neck ROM: full    Dental     Cardiovascular   Rhythm and rate: regular and normal      Pulmonary    breath sounds clear to auscultation    Other findings: Lab Test        01/10/18     10/26/17                       1612          1007          WBC          7.9          8.8           HGB          11.4*        12.4          MCV          93           92            PLT          123*         155            No lab results found.                  Anesthesia Plan      History & Physical Review      ASA Status:  2 emergent.    NPO Status:  > 4 hours and waived due to emergency    Plan for Spinal   PONV prophylaxis:  Ondansetron (or other 5HT-3) and Dexamethasone or Solumedrol       Postoperative Care  Postoperative pain management:  IV analgesics, Oral pain medications and Neuraxial analgesia.      Consents  Anesthetic plan, risks, benefits and alternatives discussed with:  Patient.  Use of blood products discussed: Yes.   Use of blood products discussed with Patient.  Consented to blood products.  .                            .

## 2018-03-26 LAB — HGB BLD-MCNC: 10.4 G/DL (ref 11.7–15.7)

## 2018-03-26 PROCEDURE — 25000132 ZZH RX MED GY IP 250 OP 250 PS 637: Performed by: OBSTETRICS & GYNECOLOGY

## 2018-03-26 PROCEDURE — 85018 HEMOGLOBIN: CPT | Performed by: OBSTETRICS & GYNECOLOGY

## 2018-03-26 PROCEDURE — 12000027 ZZH R&B OB

## 2018-03-26 PROCEDURE — 36415 COLL VENOUS BLD VENIPUNCTURE: CPT | Performed by: OBSTETRICS & GYNECOLOGY

## 2018-03-26 PROCEDURE — 40000083 ZZH STATISTIC IP LACTATION SERVICES 1-15 MIN

## 2018-03-26 RX ADMIN — ACETAMINOPHEN 975 MG: 325 TABLET, FILM COATED ORAL at 18:13

## 2018-03-26 RX ADMIN — IBUPROFEN 800 MG: 800 TABLET ORAL at 18:13

## 2018-03-26 RX ADMIN — IBUPROFEN 800 MG: 800 TABLET ORAL at 12:07

## 2018-03-26 RX ADMIN — SENNOSIDES AND DOCUSATE SODIUM 1 TABLET: 8.6; 5 TABLET ORAL at 09:29

## 2018-03-26 RX ADMIN — OXYCODONE HYDROCHLORIDE 5 MG: 5 TABLET ORAL at 09:29

## 2018-03-26 RX ADMIN — OXYCODONE HYDROCHLORIDE 10 MG: 5 TABLET ORAL at 16:21

## 2018-03-26 RX ADMIN — OXYCODONE HYDROCHLORIDE 10 MG: 5 TABLET ORAL at 20:16

## 2018-03-26 RX ADMIN — ACETAMINOPHEN 975 MG: 325 TABLET, FILM COATED ORAL at 09:29

## 2018-03-26 RX ADMIN — IBUPROFEN 800 MG: 800 TABLET ORAL at 04:00

## 2018-03-26 RX ADMIN — SENNOSIDES AND DOCUSATE SODIUM 1 TABLET: 8.6; 5 TABLET ORAL at 20:16

## 2018-03-26 RX ADMIN — OXYCODONE HYDROCHLORIDE 10 MG: 5 TABLET ORAL at 23:52

## 2018-03-26 RX ADMIN — OXYCODONE HYDROCHLORIDE 10 MG: 5 TABLET ORAL at 13:02

## 2018-03-26 RX ADMIN — PRENATAL VIT W/ FE FUMARATE-FA TAB 27-0.8 MG 1 TABLET: 27-0.8 TAB at 09:28

## 2018-03-26 RX ADMIN — ACETAMINOPHEN 975 MG: 325 TABLET, FILM COATED ORAL at 01:10

## 2018-03-26 NOTE — LACTATION NOTE
LC to see patient.  RN observed a good latch this morning.  Patient reports that her nipples are sore, but her baby has been latching frequently and to both sides.  She hears swallows with nursing.  LC encouraged her to call for assistance when needed.  LC will follow up tomorrow.

## 2018-03-26 NOTE — PROGRESS NOTES
[facility] Obstetrics Post-Op / Progress Note         Assessment and Plan:    Assessment:   Post-operative day #2  Low transverse primary  section  L&D complications: breech      No immediate surgical complications identified.      Plan:   Ambulation encouraged  Anticipate discharge tomorrow           Interval History:   Doing well.  Pain is well-controlled.  No fevers.  No history of wound drainage, warmth or significant erythema.  Good appetite.  Denies chest pain, shortness of breath, nausea or vomiting.  Ambulatory.            Significant Problems:    None          Review of Systems:    The patient denies any chest pain, shortness of breath, excessive pain, fever, chills, purulent drainage from the wound, nausea or vomiting.          Medications:   All medications related to the patient's surgery have been reviewed          Physical Exam:   All vitals stable  Wound dressing in place          Data:   All laboratory data related to this surgery reviewed  All imaging studies related to this surgery reviewed    oRney Paez MD

## 2018-03-26 NOTE — PLAN OF CARE
Problem: Patient Care Overview  Goal: Plan of Care/Patient Progress Review  Outcome: No Change  VSS. Pt has needed assistance with breastfeeding, baby latching well when assisted. Mother reports pt has been latching well with feeds. Ambulating independently, voiding. No dizziness, headache. Bonding well with baby.

## 2018-03-26 NOTE — PLAN OF CARE
Problem: Patient Care Overview  Goal: Plan of Care/Patient Progress Review  Outcome: Improving  Meeting goals for shift, see flow sheet. Up, steady on feet, caring for self and infant in room and bonding well. Showered, dressing removed, staples CDI, patient is aware that they will be removed today and will call when company leaves. Ambulation and fluids encouraged. Possible D/C later today or anticipate D/C tomorrow.

## 2018-03-26 NOTE — PLAN OF CARE
Problem: Patient Care Overview  Goal: Plan of Care/Patient Progress Review  Outcome: Improving  Pt up indep.  VSS. Pain well controlled with ibuprofen and tylenol.  Voiding.  Incision: dressing with dried drainage.  Breastfeeding, assistance provided.  Education provided regarding proper latch.

## 2018-03-27 VITALS
OXYGEN SATURATION: 96 % | SYSTOLIC BLOOD PRESSURE: 104 MMHG | WEIGHT: 193 LBS | TEMPERATURE: 98.2 F | DIASTOLIC BLOOD PRESSURE: 68 MMHG | HEART RATE: 69 BPM | RESPIRATION RATE: 18 BRPM | HEIGHT: 69 IN | BODY MASS INDEX: 28.58 KG/M2

## 2018-03-27 PROCEDURE — 25000132 ZZH RX MED GY IP 250 OP 250 PS 637: Performed by: OBSTETRICS & GYNECOLOGY

## 2018-03-27 PROCEDURE — 40000084 ZZH STATISTIC IP LACTATION SERVICES 16-30 MIN

## 2018-03-27 RX ORDER — OXYCODONE HYDROCHLORIDE 5 MG/1
5-10 TABLET ORAL
Qty: 14 TABLET | Refills: 0 | Status: SHIPPED | OUTPATIENT
Start: 2018-03-27 | End: 2018-04-04

## 2018-03-27 RX ORDER — ACETAMINOPHEN 325 MG/1
650 TABLET ORAL EVERY 4 HOURS PRN
Qty: 100 TABLET | COMMUNITY
Start: 2018-03-28 | End: 2024-02-27

## 2018-03-27 RX ORDER — IBUPROFEN 400 MG/1
400 TABLET, FILM COATED ORAL EVERY 6 HOURS PRN
Qty: 120 TABLET | COMMUNITY
Start: 2018-03-27 | End: 2024-02-27

## 2018-03-27 RX ADMIN — OXYCODONE HYDROCHLORIDE 5 MG: 5 TABLET ORAL at 12:59

## 2018-03-27 RX ADMIN — ACETAMINOPHEN 975 MG: 325 TABLET, FILM COATED ORAL at 03:31

## 2018-03-27 RX ADMIN — PRENATAL VIT W/ FE FUMARATE-FA TAB 27-0.8 MG 1 TABLET: 27-0.8 TAB at 10:49

## 2018-03-27 RX ADMIN — ACETAMINOPHEN 975 MG: 325 TABLET, FILM COATED ORAL at 11:41

## 2018-03-27 RX ADMIN — OXYCODONE HYDROCHLORIDE 10 MG: 5 TABLET ORAL at 07:05

## 2018-03-27 RX ADMIN — IBUPROFEN 800 MG: 800 TABLET ORAL at 10:49

## 2018-03-27 RX ADMIN — IBUPROFEN 800 MG: 800 TABLET ORAL at 03:31

## 2018-03-27 RX ADMIN — OXYCODONE HYDROCHLORIDE 5 MG: 5 TABLET ORAL at 10:49

## 2018-03-27 NOTE — PLAN OF CARE
Problem: Patient Care Overview  Goal: Plan of Care/Patient Progress Review  Outcome: No Change  VSS. Pt ambulating, breastfeeding independently every 2-3hrs. Weight down, so had mom start hand expressing milk and spoon feeding baby. Took a good amount. Staples taken out, well-approximated incision with no drainage. Pain controlled with oxy and tyl and motrin. Bonding well with baby.

## 2018-03-27 NOTE — PLAN OF CARE
Problem: Patient Care Overview  Goal: Plan of Care/Patient Progress Review  Outcome: Adequate for Discharge Date Met: 03/27/18  Meeting goals for shift,and ready for discharge to home, see flow sheet. Comfortable, caring for self and infant in room with S/O bonding well. Breast feeding independently and comfortable.

## 2018-03-27 NOTE — PROGRESS NOTES
[facility] [place] Obstetrics Post-Op / Progress Note         Assessment and Plan:    Assessment:   Post-operative day #3  Low transverse primary  section  L&D complications: Breech presentation      Doing well.  Clean wound without signs of infection.  Normal healing wound.  No immediate surgical complications identified.  No excessive bleeding  Pain well-controlled.      Plan:   Ambulation encouraged  Breast feeding strategies discussed  Monitor wound for signs of infection  Pain control measures as needed  Reportable signs and symptoms dicussed with the patient  No lifting > 10 lbs for 6 wks  Take your tempature twice daily for 3 days and call if > 100.4  Nothing in vagina for 6 wks  Continue taking prenatal MVI daily for 1 month  Return to clinic in 2 weeks and 6 weeks  Discharge later today           Interval History:   Doing well.  Pain is well-controlled.  No fevers.  No history of wound drainage, warmth or significant erythema.  Good appetite.  Denies chest pain, shortness of breath, nausea or vomiting.  Ambulatory.  Breastfeeding well.          Significant Problems:      Past Medical History:   Diagnosis Date     NO ACTIVE PROBLEMS              Review of Systems:    The patient denies any chest pain, shortness of breath, excessive pain, fever, chills, purulent drainage from the wound, nausea or vomiting.          Medications:     All medications related to the patient's surgery have been reviewed  Current Facility-Administered Medications   Medication     prenatal multivitamin plus iron per tablet 1 tablet     lidocaine 1 % 1 mL     lidocaine (LMX4) kit     oxytocin (PITOCIN) 30 units in 500 mL 0.9% NaCl infusion     dextrose 5% in lactated ringers infusion     naloxone (NARCAN) injection 0.1-0.4 mg     senna-docusate (SENOKOT-S;PERICOLACE) 8.6-50 MG per tablet 1 tablet    Or     senna-docusate (SENOKOT-S;PERICOLACE) 8.6-50 MG per tablet 2 tablet     bisacodyl (DULCOLAX) Suppository 10 mg     sodium  phosphate (FLEET ENEMA) 1 enema     ondansetron (ZOFRAN) injection 4 mg     hydrocortisone 2.5 % cream     diphenhydrAMINE (BENADRYL) capsule 25 mg    Or     diphenhydrAMINE (BENADRYL) injection 25 mg     lanolin ointment     simethicone (MYLICON) chewable tablet 80 mg     oxytocin (PITOCIN) 30 units in 500 mL 0.9% NaCl infusion     oxytocin (PITOCIN) injection 10 Units     misoprostol (CYTOTEC) tablet 400 mcg     NO Rho (D) immune globulin (RhoGam) needed - mother Rh POSITIVE     acetaminophen (TYLENOL) tablet 975 mg     [START ON 3/28/2018] acetaminophen (TYLENOL) tablet 650 mg     oxyCODONE IR (ROXICODONE) tablet 5-10 mg     ibuprofen (ADVIL/MOTRIN) tablet 400 mg    Or     ibuprofen (ADVIL/MOTRIN) tablet 600 mg    Or     ibuprofen (ADVIL/MOTRIN) tablet 800 mg     HYDROmorphone (PF) (DILAUDID) injection 0.3-0.5 mg     prochlorperazine (COMPAZINE) injection 10 mg    Or     prochlorperazine (COMPAZINE) Suppository 25 mg     metoclopramide (REGLAN) injection 10 mg             Physical Exam:   Vitals were reviewed  All vitals stable  Temp: 98.2  F (36.8  C) Temp src: Oral BP: 104/68 Pulse: 69 Heart Rate: 82 Resp: 18        Wound clean and dry with minimal or no drainage.  Surrounding skin with minimal erythema.          Data:     All laboratory data related to this surgery reviewed  Hemoglobin   Date Value Ref Range Status   03/26/2018 10.4 (L) 11.7 - 15.7 g/dL Final   03/24/2018 11.2 (L) 11.7 - 15.7 g/dL Final   01/10/2018 11.4 (L) 11.7 - 15.7 g/dL Final   10/26/2017 12.4 11.7 - 15.7 g/dL Final     No imaging studies have been ordered    Ochoa Saini MD

## 2018-03-27 NOTE — PLAN OF CARE
Problem: Patient Care Overview  Goal: Plan of Care/Patient Progress Review  Outcome: Improving  VSS. Patient up ad alejandrina and tolerating activity well. Breastfeeding well and utilizing hand expression after breastfeeding. Tolerating regular diet. Pain controlled by Tylenol, Ibuprofen, and oxycodone overnight. Incision free of signs of infection. Bonding well with . Anticipated discharge 3/27/18.     Yeimy Pagan, Student RN  Reviewed by Treva Muhammad RN

## 2018-03-27 NOTE — LACTATION NOTE
"LC to see patient and discuss nursing.  Her baby has been nursing \"ok\" per patient report.  Her nipple pain has improved since yesterday and her milk is transitioning.  She pumped a good volume post feed.  Her baby has >10% weight loss now so she nurses frequently, pumps post feeding, and is offering her EBM back to baby.  She had some colostrum at the bedside, so was encouraged to offer all her breastmilk after each feeding rather than save any.  She will continue to pump post feeds and supplement until baby's follow up weight check.  No other concerns noted.  She asked about pumping and bottling exclusively.  LC discussed risks of exclusive bottling.  LC also requested that patient call for latch assessment for next feeding prior to discharge today.  No other questions noted.  "

## 2018-03-27 NOTE — DISCHARGE SUMMARY
[facility] Obstetrics Post-Op / Progress Note         Assessment and Plan:    Assessment:   Post-operative day #3  Low transverse primary  section  L&D complications: Breech presentation      Doing well.  Clean wound without signs of infection.  Normal healing wound.  No immediate surgical complications identified.  No excessive bleeding  Pain well-controlled.      Plan:   Ambulation encouraged  Breast feeding strategies discussed  Monitor wound for signs of infection  Pain control measures as needed  Reportable signs and symptoms dicussed with the patient  No lifting > 10 lbs for 6 wks  Take your tempature twice daily for 3 days and call if > 100.4  Nothing in vagina for 6 wks  Continue taking prenatal MVI daily for 1 month  Return to clinic in 2 weeks and 6 weeks  Discharge later today           Interval History:   Doing well.  Pain is well-controlled.  No fevers.  No history of wound drainage, warmth or significant erythema.  Good appetite.  Denies chest pain, shortness of breath, nausea or vomiting.  Ambulatory.  Breastfeeding well.          Significant Problems:      Past Medical History:   Diagnosis Date     NO ACTIVE PROBLEMS              Review of Systems:    The patient denies any chest pain, shortness of breath, excessive pain, fever, chills, purulent drainage from the wound, nausea or vomiting.          Medications:     All medications related to the patient's surgery have been reviewed  Current Facility-Administered Medications   Medication     prenatal multivitamin plus iron per tablet 1 tablet     lidocaine 1 % 1 mL     lidocaine (LMX4) kit     oxytocin (PITOCIN) 30 units in 500 mL 0.9% NaCl infusion     dextrose 5% in lactated ringers infusion     naloxone (NARCAN) injection 0.1-0.4 mg     senna-docusate (SENOKOT-S;PERICOLACE) 8.6-50 MG per tablet 1 tablet    Or     senna-docusate (SENOKOT-S;PERICOLACE) 8.6-50 MG per tablet 2 tablet     bisacodyl (DULCOLAX) Suppository 10 mg     sodium  phosphate (FLEET ENEMA) 1 enema     ondansetron (ZOFRAN) injection 4 mg     hydrocortisone 2.5 % cream     diphenhydrAMINE (BENADRYL) capsule 25 mg    Or     diphenhydrAMINE (BENADRYL) injection 25 mg     lanolin ointment     simethicone (MYLICON) chewable tablet 80 mg     oxytocin (PITOCIN) 30 units in 500 mL 0.9% NaCl infusion     oxytocin (PITOCIN) injection 10 Units     misoprostol (CYTOTEC) tablet 400 mcg     NO Rho (D) immune globulin (RhoGam) needed - mother Rh POSITIVE     acetaminophen (TYLENOL) tablet 975 mg     [START ON 3/28/2018] acetaminophen (TYLENOL) tablet 650 mg     oxyCODONE IR (ROXICODONE) tablet 5-10 mg     ibuprofen (ADVIL/MOTRIN) tablet 400 mg    Or     ibuprofen (ADVIL/MOTRIN) tablet 600 mg    Or     ibuprofen (ADVIL/MOTRIN) tablet 800 mg     HYDROmorphone (PF) (DILAUDID) injection 0.3-0.5 mg     prochlorperazine (COMPAZINE) injection 10 mg    Or     prochlorperazine (COMPAZINE) Suppository 25 mg     metoclopramide (REGLAN) injection 10 mg             Physical Exam:   Vitals were reviewed  All vitals stable  Temp: 98.2  F (36.8  C) Temp src: Oral BP: 104/68 Pulse: 69 Heart Rate: 82 Resp: 18        Wound clean and dry with minimal or no drainage.  Surrounding skin with minimal erythema.          Data:     All laboratory data related to this surgery reviewed  Hemoglobin   Date Value Ref Range Status   03/26/2018 10.4 (L) 11.7 - 15.7 g/dL Final   03/24/2018 11.2 (L) 11.7 - 15.7 g/dL Final   01/10/2018 11.4 (L) 11.7 - 15.7 g/dL Final   10/26/2017 12.4 11.7 - 15.7 g/dL Final     No imaging studies have been ordered    Ochoa Saini MD

## 2018-04-04 ENCOUNTER — TELEPHONE (OUTPATIENT)
Dept: OBGYN | Facility: CLINIC | Age: 33
End: 2018-04-04

## 2018-04-04 DIAGNOSIS — Z98.891 S/P C-SECTION: ICD-10-CM

## 2018-04-04 RX ORDER — OXYCODONE HYDROCHLORIDE 5 MG/1
5-10 TABLET ORAL
Qty: 14 TABLET | Refills: 0 | Status: SHIPPED | OUTPATIENT
Start: 2018-04-04 | End: 2024-02-27

## 2018-04-04 NOTE — TELEPHONE ENCOUNTER
Pt calling. She had a c/s on 3/24 with Dr. Paez and she is still having cramping and incisional pain. The pain is 8/10, but goes down to 6/10 with tylenol and ibuprofen. She is hoping to get more Oxycodone IR tablets. She ran out yesterday and tried going with just ibuprofen and tylenol, but said the pain was too much. Please advise.      Natasha Springer RN

## 2018-04-04 NOTE — TELEPHONE ENCOUNTER
rx oxycodone filled plz advise   At  [place]  Dr. Eli Fung, DO    Obstetrics and Gynecology  [facility] [place] [facility] [place]

## 2018-04-04 NOTE — TELEPHONE ENCOUNTER
Left message on answering machine for patient that RX would be at  in [place] for her to  before 5. To call back if any questions.  Cheryl Patel RN

## 2018-04-30 DIAGNOSIS — Z53.9 DIAGNOSIS NOT YET DEFINED: Primary | ICD-10-CM

## 2018-04-30 PROCEDURE — G0180 MD CERTIFICATION HHA PATIENT: HCPCS | Performed by: OBSTETRICS & GYNECOLOGY

## 2018-06-19 DIAGNOSIS — Z53.9 DIAGNOSIS NOT YET DEFINED: Primary | ICD-10-CM

## 2018-06-19 PROCEDURE — G0180 MD CERTIFICATION HHA PATIENT: HCPCS | Performed by: OBSTETRICS & GYNECOLOGY

## 2023-01-25 VITALS
TEMPERATURE: 97.6 F | SYSTOLIC BLOOD PRESSURE: 139 MMHG | OXYGEN SATURATION: 100 % | WEIGHT: 181 LBS | DIASTOLIC BLOOD PRESSURE: 81 MMHG | HEART RATE: 60 BPM | BODY MASS INDEX: 26.73 KG/M2 | RESPIRATION RATE: 14 BRPM

## 2023-01-25 LAB
ALBUMIN UR-MCNC: NEGATIVE MG/DL
ANION GAP SERPL CALCULATED.3IONS-SCNC: 11 MMOL/L (ref 7–15)
APPEARANCE UR: CLEAR
BILIRUB UR QL STRIP: NEGATIVE
BUN SERPL-MCNC: 11.7 MG/DL (ref 6–20)
CALCIUM SERPL-MCNC: 9 MG/DL (ref 8.6–10)
CHLORIDE SERPL-SCNC: 102 MMOL/L (ref 98–107)
COLOR UR AUTO: YELLOW
CREAT SERPL-MCNC: 0.54 MG/DL (ref 0.51–0.95)
DEPRECATED HCO3 PLAS-SCNC: 23 MMOL/L (ref 22–29)
GFR SERPL CREATININE-BSD FRML MDRD: >90 ML/MIN/1.73M2
GLUCOSE SERPL-MCNC: 109 MG/DL (ref 70–99)
GLUCOSE UR STRIP-MCNC: NEGATIVE MG/DL
HCG UR QL: NEGATIVE
HGB UR QL STRIP: NEGATIVE
KETONES UR STRIP-MCNC: ABNORMAL MG/DL
LEUKOCYTE ESTERASE UR QL STRIP: NEGATIVE
NITRATE UR QL: NEGATIVE
PH UR STRIP: 5.5 [PH] (ref 5–7)
POTASSIUM SERPL-SCNC: 5.6 MMOL/L (ref 3.4–5.3)
SODIUM SERPL-SCNC: 136 MMOL/L (ref 136–145)
SP GR UR STRIP: 1.03 (ref 1–1.03)
UROBILINOGEN UR STRIP-MCNC: NORMAL MG/DL

## 2023-01-25 PROCEDURE — 80048 BASIC METABOLIC PNL TOTAL CA: CPT | Performed by: EMERGENCY MEDICINE

## 2023-01-25 PROCEDURE — 99285 EMERGENCY DEPT VISIT HI MDM: CPT | Mod: 25

## 2023-01-25 PROCEDURE — 36415 COLL VENOUS BLD VENIPUNCTURE: CPT | Performed by: EMERGENCY MEDICINE

## 2023-01-25 PROCEDURE — 96361 HYDRATE IV INFUSION ADD-ON: CPT

## 2023-01-25 PROCEDURE — 81025 URINE PREGNANCY TEST: CPT | Performed by: EMERGENCY MEDICINE

## 2023-01-25 PROCEDURE — 81003 URINALYSIS AUTO W/O SCOPE: CPT | Performed by: EMERGENCY MEDICINE

## 2023-01-25 PROCEDURE — 96374 THER/PROPH/DIAG INJ IV PUSH: CPT

## 2023-01-25 PROCEDURE — 96375 TX/PRO/DX INJ NEW DRUG ADDON: CPT

## 2023-01-26 ENCOUNTER — HOSPITAL ENCOUNTER (EMERGENCY)
Facility: CLINIC | Age: 38
Discharge: HOME | End: 2023-01-26
Attending: EMERGENCY MEDICINE | Admitting: EMERGENCY MEDICINE
Payer: COMMERCIAL

## 2023-01-26 ENCOUNTER — APPOINTMENT (OUTPATIENT)
Dept: ULTRASOUND IMAGING | Facility: CLINIC | Age: 38
End: 2023-01-26
Attending: EMERGENCY MEDICINE
Payer: COMMERCIAL

## 2023-01-26 ENCOUNTER — APPOINTMENT (OUTPATIENT)
Dept: CT IMAGING | Facility: CLINIC | Age: 38
End: 2023-01-26
Attending: EMERGENCY MEDICINE
Payer: COMMERCIAL

## 2023-01-26 DIAGNOSIS — R10.32 ABDOMINAL PAIN, LEFT LOWER QUADRANT: ICD-10-CM

## 2023-01-26 DIAGNOSIS — N20.0 NEPHROLITHIASIS: ICD-10-CM

## 2023-01-26 LAB
ANION GAP SERPL CALCULATED.3IONS-SCNC: 14 MMOL/L (ref 7–15)
BASOPHILS # BLD AUTO: 0 10E3/UL (ref 0–0.2)
BASOPHILS NFR BLD AUTO: 0 %
BUN SERPL-MCNC: 11.2 MG/DL (ref 6–20)
CALCIUM SERPL-MCNC: 8.9 MG/DL (ref 8.6–10)
CHLORIDE SERPL-SCNC: 102 MMOL/L (ref 98–107)
CREAT SERPL-MCNC: 0.5 MG/DL (ref 0.51–0.95)
DEPRECATED HCO3 PLAS-SCNC: 22 MMOL/L (ref 22–29)
EOSINOPHIL # BLD AUTO: 0 10E3/UL (ref 0–0.7)
EOSINOPHIL NFR BLD AUTO: 0 %
ERYTHROCYTE [DISTWIDTH] IN BLOOD BY AUTOMATED COUNT: 12 % (ref 10–15)
GFR SERPL CREATININE-BSD FRML MDRD: >90 ML/MIN/1.73M2
GLUCOSE SERPL-MCNC: 111 MG/DL (ref 70–99)
HCT VFR BLD AUTO: 38.4 % (ref 35–47)
HGB BLD-MCNC: 12.7 G/DL (ref 11.7–15.7)
IMM GRANULOCYTES # BLD: 0 10E3/UL
IMM GRANULOCYTES NFR BLD: 0 %
LYMPHOCYTES # BLD AUTO: 1.9 10E3/UL (ref 0.8–5.3)
LYMPHOCYTES NFR BLD AUTO: 20 %
MCH RBC QN AUTO: 30.9 PG (ref 26.5–33)
MCHC RBC AUTO-ENTMCNC: 33.1 G/DL (ref 31.5–36.5)
MCV RBC AUTO: 93 FL (ref 78–100)
MONOCYTES # BLD AUTO: 0.4 10E3/UL (ref 0–1.3)
MONOCYTES NFR BLD AUTO: 4 %
NEUTROPHILS # BLD AUTO: 7.3 10E3/UL (ref 1.6–8.3)
NEUTROPHILS NFR BLD AUTO: 76 %
NRBC # BLD AUTO: 0 10E3/UL
NRBC BLD AUTO-RTO: 0 /100
PLATELET # BLD AUTO: 201 10E3/UL (ref 150–450)
POTASSIUM SERPL-SCNC: 4.1 MMOL/L (ref 3.4–5.3)
RBC # BLD AUTO: 4.11 10E6/UL (ref 3.8–5.2)
SODIUM SERPL-SCNC: 138 MMOL/L (ref 136–145)
WBC # BLD AUTO: 9.7 10E3/UL (ref 4–11)

## 2023-01-26 PROCEDURE — 258N000003 HC RX IP 258 OP 636: Performed by: EMERGENCY MEDICINE

## 2023-01-26 PROCEDURE — 36415 COLL VENOUS BLD VENIPUNCTURE: CPT | Performed by: EMERGENCY MEDICINE

## 2023-01-26 PROCEDURE — 250N000011 HC RX IP 250 OP 636: Performed by: EMERGENCY MEDICINE

## 2023-01-26 PROCEDURE — 74176 CT ABD & PELVIS W/O CONTRAST: CPT

## 2023-01-26 PROCEDURE — 85025 COMPLETE CBC W/AUTO DIFF WBC: CPT | Performed by: EMERGENCY MEDICINE

## 2023-01-26 PROCEDURE — 80048 BASIC METABOLIC PNL TOTAL CA: CPT | Performed by: EMERGENCY MEDICINE

## 2023-01-26 PROCEDURE — 250N000013 HC RX MED GY IP 250 OP 250 PS 637: Performed by: EMERGENCY MEDICINE

## 2023-01-26 PROCEDURE — 76830 TRANSVAGINAL US NON-OB: CPT

## 2023-01-26 RX ORDER — ONDANSETRON 2 MG/ML
4 INJECTION INTRAMUSCULAR; INTRAVENOUS ONCE
Status: COMPLETED | OUTPATIENT
Start: 2023-01-26 | End: 2023-01-26

## 2023-01-26 RX ORDER — ACETAMINOPHEN 500 MG
1000 TABLET ORAL ONCE
Status: COMPLETED | OUTPATIENT
Start: 2023-01-26 | End: 2023-01-26

## 2023-01-26 RX ORDER — KETOROLAC TROMETHAMINE 15 MG/ML
15 INJECTION, SOLUTION INTRAMUSCULAR; INTRAVENOUS ONCE
Status: COMPLETED | OUTPATIENT
Start: 2023-01-26 | End: 2023-01-26

## 2023-01-26 RX ADMIN — ACETAMINOPHEN 1000 MG: 500 TABLET ORAL at 02:23

## 2023-01-26 RX ADMIN — ONDANSETRON 4 MG: 2 INJECTION INTRAMUSCULAR; INTRAVENOUS at 02:23

## 2023-01-26 RX ADMIN — SODIUM CHLORIDE 1000 ML: 9 INJECTION, SOLUTION INTRAVENOUS at 02:22

## 2023-01-26 RX ADMIN — KETOROLAC TROMETHAMINE 15 MG: 15 INJECTION, SOLUTION INTRAMUSCULAR; INTRAVENOUS at 02:23

## 2023-01-26 ASSESSMENT — ENCOUNTER SYMPTOMS
CONSTIPATION: 0
BACK PAIN: 0
VOMITING: 1
HEMATURIA: 0
NAUSEA: 1
ABDOMINAL PAIN: 1
DIARRHEA: 0

## 2023-01-26 ASSESSMENT — ACTIVITIES OF DAILY LIVING (ADL)
ADLS_ACUITY_SCORE: 35
ADLS_ACUITY_SCORE: 37

## 2023-01-26 NOTE — ED PROVIDER NOTES
History     Chief Complaint:  Abdominal Pain and Nausea & Vomiting     The history is provided by the patient and the spouse (Anne).      Anne Costello is a 37 year old female who presents with abdominal pain, nausea, and vomiting. For the last month she has had intermittent lower left quadrant pain which she was able to control with ibuprofen. Her pain worsening acutely tonight without a clear trigger, stabbing and shooting in quality. It does not radiate to the back and is not present on the right side. It is not exacerbated by eating. She had vomiting tonight which she attributes to the severity of pain. She denies diarrhea, constipation, or hematuria. She has no concern for STD.    ROS:  Review of Systems   Constitutional: Negative for activity change.   Gastrointestinal: Positive for abdominal pain, nausea and vomiting. Negative for constipation and diarrhea.   Genitourinary: Negative for hematuria.   Musculoskeletal: Negative for back pain.   All other systems reviewed and are negative.    Allergies:  No Known Allergies     Medications:    The patient is not currently taking any prescribed medications.    Past Medical History:    The patient denies any significant past medical history.    Past Surgical History:     section, no other abdominal surgeries  Tonsillectomy & adenoidectomy     Family History:    Child: Galactosemia    Social History:  She occasionally drinks alcohol.  Arrives via private vehicle with her partner, Stevan.   Has children.    Physical Exam     Patient Vitals for the past 24 hrs:   BP Temp Temp src Pulse Resp SpO2 Weight   23 139/81 97.6  F (36.4  C) Temporal 60 14 100 % 82.1 kg (181 lb)        Physical Exam  General: Well-developed and well-nourished. Well appearing young  woman, sleeping in the recliner when I entered her room in fast track. Cooperative.  Head:  Atraumatic.  Eyes:  Conjunctivae, lids, and sclerae are normal.  Neck:  Supple. Normal range  of motion.  CV:  Regular rate and rhythm. Normal heart sounds with no murmurs, rubs, or gallops detected.  Resp:  No respiratory distress. Clear to auscultation bilaterally without decreased breath sounds, wheezing, rales, or rhonchi.  GI:  Soft. Non-distended. Mild tenderness to deep palpation in the left lower quadrant. No CVA tenderness.   MS:  Normal ROM.  Skin:  Warm. Non-diaphoretic. No pallor.  Neuro:  Awake. A&Ox3. Normal strength.  Psych: Normal mood and affect. Normal speech.  Vitals reviewed.    Emergency Department Course     Imaging:  US Pelvis Cmplt w Transvag & Doppler LmtPel Duplex Limited   Final Result   IMPRESSION:     1.  Sonographically unremarkable uterus and ovaries.   2.  Trace free fluid in the cervical canal               Abd/pelvis CT no contrast - Stone Protocol   Final Result   IMPRESSION:    1.  Tiny stone in the lower pole of the left kidney, unchanged. No additional urinary tract calculi. No obstruction on either side, unchanged.      2.  Scattered colonic diverticulosis, more apparent distally, without acute inflammation, obstruction, free gas or free fluid. Normal appendix. Tiny hiatal hernia. Tiny fat-containing ventral umbilical hernia.      3.  Resolved small amount of dependent pelvic free fluid demonstrated on prior study.            Report per radiology    Laboratory:  Labs Ordered and Resulted from Time of ED Arrival to Time of ED Departure   UA MACROSCOPIC WITH REFLEX TO MICRO AND CULTURE - Abnormal       Result Value    Color Urine Yellow      Appearance Urine Clear      Glucose Urine Negative      Bilirubin Urine Negative      Ketones Urine Trace (*)     Specific Gravity Urine 1.029      Blood Urine Negative      pH Urine 5.5      Protein Albumin Urine Negative      Urobilinogen Urine Normal      Nitrite Urine Negative      Leukocyte Esterase Urine Negative     BASIC METABOLIC PANEL - Abnormal    Sodium 136      Potassium 5.6 (*)     Chloride 102      Carbon Dioxide (CO2)  23      Anion Gap 11      Urea Nitrogen 11.7      Creatinine 0.54      Calcium 9.0      Glucose 109 (*)     GFR Estimate >90     BASIC METABOLIC PANEL - Abnormal    Sodium 138      Potassium 4.1      Chloride 102      Carbon Dioxide (CO2) 22      Anion Gap 14      Urea Nitrogen 11.2      Creatinine 0.50 (*)     Calcium 8.9      Glucose 111 (*)     GFR Estimate >90     HCG QUALITATIVE URINE - Normal    hCG Urine Qualitative Negative     CBC WITH PLATELETS AND DIFFERENTIAL    WBC Count 9.7      RBC Count 4.11      Hemoglobin 12.7      Hematocrit 38.4      MCV 93      MCH 30.9      MCHC 33.1      RDW 12.0      Platelet Count 201      % Neutrophils 76      % Lymphocytes 20      % Monocytes 4      % Eosinophils 0      % Basophils 0      % Immature Granulocytes 0      NRBCs per 100 WBC 0      Absolute Neutrophils 7.3      Absolute Lymphocytes 1.9      Absolute Monocytes 0.4      Absolute Eosinophils 0.0      Absolute Basophils 0.0      Absolute Immature Granulocytes 0.0      Absolute NRBCs 0.0        Emergency Department Course & Assessments:  Interventions:  Medications   ketorolac (TORADOL) injection 15 mg (15 mg Intravenous Given 1/26/23 0223)   ondansetron (ZOFRAN) injection 4 mg (4 mg Intravenous Given 1/26/23 0223)   acetaminophen (TYLENOL) tablet 1,000 mg (1,000 mg Oral Given 1/26/23 0223)   0.9% sodium chloride BOLUS (1,000 mLs Intravenous Stopped 1/26/23 0327)      Independent Interpretation (X-rays, CTs, rhythm strip):  I independently interpreted the patient's CT scan.    Social Determinants of Health affecting care:  Supportive partner, children.    Assessments:  0208 I obtained history and examined the patient as noted above.  0330 I rechecked the patient and explained findings. She is comfortable with discharge.     Disposition:  The patient was discharged.     Impression & Plan    Medical Decision Making:  Anne is a 38 year old woman who has had a month of intermittent left lower quadrant pain that  significantly worsened tonight and caused her to have vomiting.  She denies any other symptoms and appears well on exam, remarking her pain is improved compared to the severity it was at home.  She has mild left lower quadrant tenderness to deep palpation.    Fortunately, CT does not reveal acute pathology.  As such, she was sent for ultrasound of the pelvis.  This also is without acute pathology to explain her symptoms.  Laboratory studies are unremarkable with no UTI.  She denies concern for STD and there is no leukocytosis.    While undergoing her work-up she was treated with Toradol, Zofran, Tylenol, and IV fluids.  The cause of her pain is unclear at this time but she is appropriate for discharge given her reassuring exam as well as imaging and laboratory studies, particularly as she is overall improved.  Differential would include a passed kidney stone or musculoskeletal etiology.  I recommended she use ibuprofen or a warm pack should she have recurrence but she does understand indications to return to the emergency department.  Otherwise she should follow-up with her primary care provider and/or gynecologist.  All questions answered.  Amenable to discharge.    Diagnosis:    ICD-10-CM    1. Abdominal pain, left lower quadrant  R10.32       2. Nephrolithiasis  N20.0              Scribe Disclosure:  I, Sagrario Pickett, am serving as a scribe at 2:00 AM on 1/26/2023 to document services personally performed by Jen Andino MD based on my observations and the provider's statements to me.     1/26/2023   Jen Andino MD Dixson, Kylie S, MD  02/22/23 0953

## 2023-01-26 NOTE — ED TRIAGE NOTES
37F presents to ED c/o LLQ abd pain.  Pt states the pain has been intermittent for a few weeks.  Pt states the pain worsened and began to cause nausea and vomiting.  Pt states sx have improved but remain.  No change in voiding or stool since onset of sx.  VSS, A&Ox4     Triage Assessment     Row Name 01/25/23 2022       Triage Assessment (Adult)    Airway WDL WDL       Respiratory WDL    Respiratory WDL WDL       Skin Circulation/Temperature WDL    Skin Circulation/Temperature WDL WDL       Cardiac WDL    Cardiac WDL WDL       Peripheral/Neurovascular WDL    Peripheral Neurovascular WDL WDL       Cognitive/Neuro/Behavioral WDL    Cognitive/Neuro/Behavioral WDL WDL

## 2023-01-26 NOTE — DISCHARGE INSTRUCTIONS
Work up today does not show what is causing your symptoms today.  It is possible you passed a kidney stone or it may be muscular.    If your pain returns I would recommend you use ibuprofen as you have been.  Consider a warm pack.    I would recommend you follow-up with your primary care provider or gynecologist if your symptoms persist.  Return immediately if you have worsening of symptoms or new concerns including, but not limited to, uncontrolled vomiting or fever.

## 2023-02-21 ASSESSMENT — ENCOUNTER SYMPTOMS: ACTIVITY CHANGE: 0

## 2024-02-27 ENCOUNTER — HOSPITAL ENCOUNTER (INPATIENT)
Facility: CLINIC | Age: 39
LOS: 2 days | Discharge: HOME | End: 2024-02-29
Attending: EMERGENCY MEDICINE | Admitting: INTERNAL MEDICINE
Payer: COMMERCIAL

## 2024-02-27 DIAGNOSIS — K57.80 PERFORATED DIVERTICULUM: ICD-10-CM

## 2024-02-27 LAB
ABO/RH(D): NORMAL
ALBUMIN SERPL BCG-MCNC: 4.1 G/DL (ref 3.5–5.2)
ALP SERPL-CCNC: 82 U/L (ref 40–150)
ALT SERPL W P-5'-P-CCNC: 14 U/L (ref 0–50)
ANION GAP SERPL CALCULATED.3IONS-SCNC: 11 MMOL/L (ref 7–15)
ANTIBODY SCREEN: NEGATIVE
AST SERPL W P-5'-P-CCNC: 16 U/L (ref 0–45)
BASOPHILS # BLD AUTO: 0 10E3/UL (ref 0–0.2)
BASOPHILS NFR BLD AUTO: 0 %
BILIRUB DIRECT SERPL-MCNC: <0.2 MG/DL (ref 0–0.3)
BILIRUB SERPL-MCNC: 0.4 MG/DL
BUN SERPL-MCNC: 11.6 MG/DL (ref 6–20)
CALCIUM SERPL-MCNC: 8.9 MG/DL (ref 8.6–10)
CHLORIDE SERPL-SCNC: 99 MMOL/L (ref 98–107)
CREAT SERPL-MCNC: 0.67 MG/DL (ref 0.51–0.95)
DEPRECATED HCO3 PLAS-SCNC: 25 MMOL/L (ref 22–29)
EGFRCR SERPLBLD CKD-EPI 2021: >90 ML/MIN/1.73M2
EOSINOPHIL # BLD AUTO: 0 10E3/UL (ref 0–0.7)
EOSINOPHIL NFR BLD AUTO: 0 %
ERYTHROCYTE [DISTWIDTH] IN BLOOD BY AUTOMATED COUNT: 12 % (ref 10–15)
FLUAV RNA SPEC QL NAA+PROBE: NEGATIVE
FLUBV RNA RESP QL NAA+PROBE: NEGATIVE
GLUCOSE SERPL-MCNC: 114 MG/DL (ref 70–99)
HCT VFR BLD AUTO: 40.6 % (ref 35–47)
HGB BLD-MCNC: 13.5 G/DL (ref 11.7–15.7)
HOLD SPECIMEN: NORMAL
HOLD SPECIMEN: NORMAL
IMM GRANULOCYTES # BLD: 0 10E3/UL
IMM GRANULOCYTES NFR BLD: 1 %
LACTATE SERPL-SCNC: 1.2 MMOL/L (ref 0.7–2)
LIPASE SERPL-CCNC: 23 U/L (ref 13–60)
LYMPHOCYTES # BLD AUTO: 1.3 10E3/UL (ref 0.8–5.3)
LYMPHOCYTES NFR BLD AUTO: 15 %
MCH RBC QN AUTO: 30.5 PG (ref 26.5–33)
MCHC RBC AUTO-ENTMCNC: 33.3 G/DL (ref 31.5–36.5)
MCV RBC AUTO: 92 FL (ref 78–100)
MONOCYTES # BLD AUTO: 0.4 10E3/UL (ref 0–1.3)
MONOCYTES NFR BLD AUTO: 4 %
NEUTROPHILS # BLD AUTO: 7 10E3/UL (ref 1.6–8.3)
NEUTROPHILS NFR BLD AUTO: 80 %
NRBC # BLD AUTO: 0 10E3/UL
NRBC BLD AUTO-RTO: 0 /100
PLATELET # BLD AUTO: 376 10E3/UL (ref 150–450)
POTASSIUM SERPL-SCNC: 3.7 MMOL/L (ref 3.4–5.3)
PROCALCITONIN SERPL IA-MCNC: 0.13 NG/ML
PROT SERPL-MCNC: 7.5 G/DL (ref 6.4–8.3)
RBC # BLD AUTO: 4.43 10E6/UL (ref 3.8–5.2)
RSV RNA SPEC NAA+PROBE: NEGATIVE
SARS-COV-2 RNA RESP QL NAA+PROBE: NEGATIVE
SODIUM SERPL-SCNC: 135 MMOL/L (ref 135–145)
SPECIMEN EXPIRATION DATE: NORMAL
WBC # BLD AUTO: 8.7 10E3/UL (ref 4–11)

## 2024-02-27 PROCEDURE — 87637 SARSCOV2&INF A&B&RSV AMP PRB: CPT | Performed by: EMERGENCY MEDICINE

## 2024-02-27 PROCEDURE — 85025 COMPLETE CBC W/AUTO DIFF WBC: CPT | Performed by: EMERGENCY MEDICINE

## 2024-02-27 PROCEDURE — 87181 SC STD AGAR DILUTION PER AGT: CPT | Performed by: EMERGENCY MEDICINE

## 2024-02-27 PROCEDURE — 84145 PROCALCITONIN (PCT): CPT | Performed by: EMERGENCY MEDICINE

## 2024-02-27 PROCEDURE — 82248 BILIRUBIN DIRECT: CPT | Performed by: EMERGENCY MEDICINE

## 2024-02-27 PROCEDURE — 99222 1ST HOSP IP/OBS MODERATE 55: CPT | Performed by: INTERNAL MEDICINE

## 2024-02-27 PROCEDURE — 80053 COMPREHEN METABOLIC PANEL: CPT | Performed by: EMERGENCY MEDICINE

## 2024-02-27 PROCEDURE — 258N000003 HC RX IP 258 OP 636: Performed by: EMERGENCY MEDICINE

## 2024-02-27 PROCEDURE — 250N000011 HC RX IP 250 OP 636: Performed by: EMERGENCY MEDICINE

## 2024-02-27 PROCEDURE — 250N000011 HC RX IP 250 OP 636: Performed by: INTERNAL MEDICINE

## 2024-02-27 PROCEDURE — 36415 COLL VENOUS BLD VENIPUNCTURE: CPT | Performed by: EMERGENCY MEDICINE

## 2024-02-27 PROCEDURE — 87149 DNA/RNA DIRECT PROBE: CPT | Performed by: EMERGENCY MEDICINE

## 2024-02-27 PROCEDURE — 83605 ASSAY OF LACTIC ACID: CPT | Performed by: EMERGENCY MEDICINE

## 2024-02-27 PROCEDURE — 83690 ASSAY OF LIPASE: CPT | Performed by: EMERGENCY MEDICINE

## 2024-02-27 PROCEDURE — 258N000003 HC RX IP 258 OP 636: Performed by: INTERNAL MEDICINE

## 2024-02-27 PROCEDURE — 99285 EMERGENCY DEPT VISIT HI MDM: CPT | Mod: 25

## 2024-02-27 PROCEDURE — 120N000004 HC R&B MS OVERFLOW

## 2024-02-27 PROCEDURE — 86900 BLOOD TYPING SEROLOGIC ABO: CPT | Performed by: EMERGENCY MEDICINE

## 2024-02-27 PROCEDURE — 96365 THER/PROPH/DIAG IV INF INIT: CPT

## 2024-02-27 RX ORDER — NALOXONE HYDROCHLORIDE 0.4 MG/ML
0.2 INJECTION, SOLUTION INTRAMUSCULAR; INTRAVENOUS; SUBCUTANEOUS
Status: DISCONTINUED | OUTPATIENT
Start: 2024-02-27 | End: 2024-02-29 | Stop reason: HOSPADM

## 2024-02-27 RX ORDER — ACETAMINOPHEN 325 MG/1
650 TABLET ORAL EVERY 4 HOURS PRN
Status: DISCONTINUED | OUTPATIENT
Start: 2024-02-27 | End: 2024-02-29 | Stop reason: HOSPADM

## 2024-02-27 RX ORDER — CALCIUM CARBONATE 500 MG/1
1000 TABLET, CHEWABLE ORAL 4 TIMES DAILY PRN
Status: DISCONTINUED | OUTPATIENT
Start: 2024-02-27 | End: 2024-02-29 | Stop reason: HOSPADM

## 2024-02-27 RX ORDER — SODIUM CHLORIDE, SODIUM LACTATE, POTASSIUM CHLORIDE, CALCIUM CHLORIDE 600; 310; 30; 20 MG/100ML; MG/100ML; MG/100ML; MG/100ML
INJECTION, SOLUTION INTRAVENOUS CONTINUOUS
Status: DISCONTINUED | OUTPATIENT
Start: 2024-02-27 | End: 2024-02-29

## 2024-02-27 RX ORDER — NALOXONE HYDROCHLORIDE 0.4 MG/ML
0.4 INJECTION, SOLUTION INTRAMUSCULAR; INTRAVENOUS; SUBCUTANEOUS
Status: DISCONTINUED | OUTPATIENT
Start: 2024-02-27 | End: 2024-02-29 | Stop reason: HOSPADM

## 2024-02-27 RX ORDER — PIPERACILLIN SODIUM, TAZOBACTAM SODIUM 4; .5 G/20ML; G/20ML
4.5 INJECTION, POWDER, LYOPHILIZED, FOR SOLUTION INTRAVENOUS EVERY 6 HOURS
Status: DISCONTINUED | OUTPATIENT
Start: 2024-02-27 | End: 2024-02-29 | Stop reason: HOSPADM

## 2024-02-27 RX ORDER — ONDANSETRON 4 MG/1
4 TABLET, ORALLY DISINTEGRATING ORAL EVERY 6 HOURS PRN
Status: DISCONTINUED | OUTPATIENT
Start: 2024-02-27 | End: 2024-02-29 | Stop reason: HOSPADM

## 2024-02-27 RX ORDER — HYDROMORPHONE HYDROCHLORIDE 1 MG/ML
0.3 INJECTION, SOLUTION INTRAMUSCULAR; INTRAVENOUS; SUBCUTANEOUS ONCE
Status: COMPLETED | OUTPATIENT
Start: 2024-02-27 | End: 2024-02-27

## 2024-02-27 RX ORDER — PROCHLORPERAZINE 25 MG
25 SUPPOSITORY, RECTAL RECTAL EVERY 12 HOURS PRN
Status: DISCONTINUED | OUTPATIENT
Start: 2024-02-27 | End: 2024-02-29 | Stop reason: HOSPADM

## 2024-02-27 RX ORDER — HYDROMORPHONE HYDROCHLORIDE 2 MG/1
2 TABLET ORAL EVERY 4 HOURS PRN
Status: DISCONTINUED | OUTPATIENT
Start: 2024-02-27 | End: 2024-02-29 | Stop reason: HOSPADM

## 2024-02-27 RX ORDER — TAMSULOSIN HYDROCHLORIDE 0.4 MG/1
0.4 CAPSULE ORAL DAILY
Status: ON HOLD | COMMUNITY
End: 2024-04-21

## 2024-02-27 RX ORDER — LIDOCAINE 40 MG/G
CREAM TOPICAL
Status: DISCONTINUED | OUTPATIENT
Start: 2024-02-27 | End: 2024-02-29 | Stop reason: HOSPADM

## 2024-02-27 RX ORDER — ONDANSETRON 2 MG/ML
4 INJECTION INTRAMUSCULAR; INTRAVENOUS ONCE
Status: COMPLETED | OUTPATIENT
Start: 2024-02-27 | End: 2024-02-27

## 2024-02-27 RX ORDER — HYDROMORPHONE HCL IN WATER/PF 6 MG/30 ML
0.2 PATIENT CONTROLLED ANALGESIA SYRINGE INTRAVENOUS
Status: DISCONTINUED | OUTPATIENT
Start: 2024-02-27 | End: 2024-02-29 | Stop reason: HOSPADM

## 2024-02-27 RX ORDER — ONDANSETRON 2 MG/ML
4 INJECTION INTRAMUSCULAR; INTRAVENOUS EVERY 6 HOURS PRN
Status: DISCONTINUED | OUTPATIENT
Start: 2024-02-27 | End: 2024-02-29 | Stop reason: HOSPADM

## 2024-02-27 RX ORDER — PIPERACILLIN SODIUM, TAZOBACTAM SODIUM 4; .5 G/20ML; G/20ML
4.5 INJECTION, POWDER, LYOPHILIZED, FOR SOLUTION INTRAVENOUS ONCE
Status: COMPLETED | OUTPATIENT
Start: 2024-02-27 | End: 2024-02-27

## 2024-02-27 RX ORDER — PROCHLORPERAZINE MALEATE 5 MG
10 TABLET ORAL EVERY 6 HOURS PRN
Status: DISCONTINUED | OUTPATIENT
Start: 2024-02-27 | End: 2024-02-29 | Stop reason: HOSPADM

## 2024-02-27 RX ORDER — HYDROMORPHONE HCL IN WATER/PF 6 MG/30 ML
0.4 PATIENT CONTROLLED ANALGESIA SYRINGE INTRAVENOUS
Status: DISCONTINUED | OUTPATIENT
Start: 2024-02-27 | End: 2024-02-29 | Stop reason: HOSPADM

## 2024-02-27 RX ADMIN — PIPERACILLIN AND TAZOBACTAM 4.5 G: 4; .5 INJECTION, POWDER, FOR SOLUTION INTRAVENOUS at 21:33

## 2024-02-27 RX ADMIN — SODIUM CHLORIDE 1000 ML: 9 INJECTION, SOLUTION INTRAVENOUS at 14:11

## 2024-02-27 RX ADMIN — SODIUM CHLORIDE, POTASSIUM CHLORIDE, SODIUM LACTATE AND CALCIUM CHLORIDE: 600; 310; 30; 20 INJECTION, SOLUTION INTRAVENOUS at 22:34

## 2024-02-27 RX ADMIN — PIPERACILLIN AND TAZOBACTAM 4.5 G: 4; .5 INJECTION, POWDER, FOR SOLUTION INTRAVENOUS at 14:40

## 2024-02-27 ASSESSMENT — ACTIVITIES OF DAILY LIVING (ADL)
ADLS_ACUITY_SCORE: 20
ADLS_ACUITY_SCORE: 37
ADLS_ACUITY_SCORE: 20
ADLS_ACUITY_SCORE: 37
ADLS_ACUITY_SCORE: 37
ADLS_ACUITY_SCORE: 20
ADLS_ACUITY_SCORE: 37
ADLS_ACUITY_SCORE: 20

## 2024-02-27 ASSESSMENT — COLUMBIA-SUICIDE SEVERITY RATING SCALE - C-SSRS
6. HAVE YOU EVER DONE ANYTHING, STARTED TO DO ANYTHING, OR PREPARED TO DO ANYTHING TO END YOUR LIFE?: NO
2. HAVE YOU ACTUALLY HAD ANY THOUGHTS OF KILLING YOURSELF IN THE PAST MONTH?: NO
1. IN THE PAST MONTH, HAVE YOU WISHED YOU WERE DEAD OR WISHED YOU COULD GO TO SLEEP AND NOT WAKE UP?: NO

## 2024-02-27 NOTE — CONSULTS
[facility]  Colon and Rectal Surgery Consult Note  Name: Anne Costello    MRN: [id number]  YOB: 1985    Age: 39 year old  Date of admission: 2024  Primary care provider: [facility], [facility] [place]     Requesting Physician:  Dr. Soto  Reason for consult:  Perforated diverticulitis           History of Present Illness:   Anne Costello is a 39 year old female with history of nephrolithiasis, diverticulosis, and , seen at the request of Dr. Soto, presents for evaluation of perforation in colon. Patient reports feeling off over the last few weeks with associated, chills, vomiting, nausea, and abdominal pain. This was intermittent at first, but then became more persistent. She last vomited on . She presented to her PCP at [facility] for further evaluation for possible kidney stones as she felt her symptoms were similar to previous episodes. A CT scan was performed outpatient, which revealed sigmoid diverticulitis with perforation and peridiverticular phlegmon. There was no mature drainable abscess. Also noted was a possible evolving coloenteric fistula. Her PCP directed her to the ER for further management.    In the ER, patient is afebrile. All other vital signs are within normal limits. Her labs are largely unremarkable with a normal CMP and CBC. No leukocytosis. Influenza and Covid tests are pending. Patient was started on IV antibiotics and was admitted for further management.    Today, the patient is resting comfortably in bed. She denies any current pain or nausea. She had a bowel movement this morning which was on the looser side but without blood. She is typically pretty regular and has a soft bowel movement daily. She notes an episode of constipation 2 weeks ago which she took some stool softeners but this had resolved. She has never had diverticulitis before.     Colonoscopy History:  No previous colonoscopy    Surgical History:              Past Medical History:     Past Medical History:   Diagnosis Date    NO ACTIVE PROBLEMS              Past Surgical History:     Past Surgical History:   Procedure Laterality Date     SECTION N/A 3/24/2018    Procedure:  SECTION;   Section;  Surgeon: Roney Paez MD;  Location: [facility] OR    TONSILLECTOMY                 Social History:     Social History     Tobacco Use    Smoking status: Former     Types: Cigarettes     Quit date: 10/1/2015     Years since quittin.4    Smokeless tobacco: Never   Substance Use Topics    Alcohol use: No             Family History:   No family history on file.          Allergies:   No Known Allergies          Medications:      HYDROmorphone  0.3 mg Intravenous Once    ondansetron  4 mg Intravenous Once    piperacillin-tazobactam  4.5 g Intravenous Once    sodium chloride 0.9%  1,000 mL Intravenous Once             Review of Systems:   A comprehensive greater than 10 system review of systems was carried out.  Pertinent positives and negatives are noted above.  Otherwise negative for contributory info.            Physical Exam:     Blood pressure 133/85, pulse 108, temperature 98.4  F (36.9  C), resp. rate 22, last menstrual period 2024, SpO2 97%, unknown if currently breastfeeding.  No intake or output data in the 24 hours ending 24 1444  EXAM:  GEN: Awake alert and oriented, appears  stated age  PULM: Non-labored breathing with normal respiratory effort  CVS: reg rate and rhythm, no peripheral edema  ABD: Soft, non tender, non distended. No rebound or guarding   RECTAL: Rectal exam was deferred  NEURO: CN II-XII grossly intact  MSK: extremeties with no clubbing, cyanosis or edema; able to ambulate  PSYCH: responsive, alert, cooperative; oriented x3; appropriate mood and affect  EXT/SKIN: inspection reveals no rashes, lesions or ulcers, normal coloring         Data Reviewed:     Results for orders placed or performed during the hospital  encounter of 01/26/23   Abd/pelvis CT no contrast - Stone Protocol    Narrative    EXAM: CT ABDOMEN PELVIS W/O CONTRAST  LOCATION: [facility]  DATE/TIME: 1/26/2023 2:43 AM    INDICATION: Colicky left abdominal pain. Evaluate for ureteral stone.  COMPARISON: CT abdomen and pelvis without IV contrast 1/13/2008.  TECHNIQUE: CT scan of the abdomen and pelvis was performed without IV contrast. Multiplanar reformats were obtained. Dose reduction techniques were used.  CONTRAST: None.    FINDINGS:   LOWER CHEST: Minor strands of linear atelectasis in the lower lungs identified today. No pleural effusion on either side. Normal cardiac size. No pericardial effusion. Tiny hiatal hernia.    HEPATOBILIARY: No discrete lesion, calcified gallstones or biliary dilatation.    PANCREAS: Normal.    SPLEEN: Normal.    ADRENAL GLANDS: Normal.    KIDNEYS/BLADDER: Tiny 0.3 cm stone in the lower pole of the left kidney (image 90, series 2, image 62, series 3), unchanged. No additional urinary tract calculi. No hydronephrosis or hydroureter on either side. Normal urinary bladder. No significant   change.    BOWEL: Scattered colonic diverticulosis, more apparent distally, without acute inflammation. No mechanical bowel obstruction, free gas or free fluid. Normal appendix. Tiny hiatal hernia.    LYMPH NODES: No suspicious abdominopelvic adenopathy.    VASCULATURE: Normal caliber abdominal aorta and the IVC.    PELVIC ORGANS: Anteverted uterus. Follicles in the ovaries. No suspicious adenopathy. Resolved small amount of dependent pelvic free fluid demonstrated previously.    MUSCULOSKELETAL: Anatomic alignment of the spine, bones and joints of the pelvis. Tiny fat-containing ventral umbilical hernia.      Impression    IMPRESSION:   1.  Tiny stone in the lower pole of the left kidney, unchanged. No additional urinary tract calculi. No obstruction on either side, unchanged.    2.  Scattered colonic diverticulosis, more  "apparent distally, without acute inflammation, obstruction, free gas or free fluid. Normal appendix. Tiny hiatal hernia. Tiny fat-containing ventral umbilical hernia.    3.  Resolved small amount of dependent pelvic free fluid demonstrated on prior study.     US Pelvis Cmplt w Transvag & Doppler LmtPel Duplex Limited    Narrative    EXAM: US PELVIS COMPLETE W TRANSVAGINAL AND DOPPLER LIMITED  LOCATION: [facility]  DATE/TIME: 1/26/2023 3:11 AM    INDICATION: intermittent severe L pelvic pain   eval torsion, cyst, etc.  COMPARISON: None.  TECHNIQUE: Transabdominal scans were performed. Endovaginal ultrasound was performed to better visualize the adnexa. Color flow with spectral Doppler and waveform analysis performed.    FINDINGS:    UTERUS: 11 x 6.3 x 5.2 cm. Normal in size and position with no masses.    ENDOMETRIUM: 12 mm. Normal smooth endometrium. Trace free fluid is seen within the cervical canal.    RIGHT OVARY: 3.5 x 2.6 x 2.3 cm. Normal with arterial and venous duplex flow identified.    LEFT OVARY: 2.9 x 2.4 x 2.4 cm. Normal with arterial and venous duplex flow identified.    No significant free fluid.      Impression    IMPRESSION:    1.  Sonographically unremarkable uterus and ovaries.  2.  Trace free fluid in the cervical canal             Recent Labs   Lab 02/27/24  1346   WBC 8.7   HGB 13.5   HCT 40.6   MCV 92        Recent Labs   Lab 02/27/24  1346      POTASSIUM 3.7   CHLORIDE 99   CO2 25   ANIONGAP 11   *   BUN 11.6   CR 0.67   GFRESTIMATED >90   TJ 8.9   PROTTOTAL 7.5   ALBUMIN 4.1   BILITOTAL 0.4   ALKPHOS 82   AST 16   ALT 14     Recent Labs   Lab 02/27/24  1346   LACT 1.2     No results for input(s): \"COLOR\", \"APPEARANCE\", \"URINEGLC\", \"URINEBILI\", \"URINEKETONE\", \"SG\", \"UBLD\", \"URINEPH\", \"PROTEIN\", \"UROBILINOGEN\", \"NITRITE\", \"LEUKEST\", \"RBCU\", \"WBCU\" in the last 168 hours.      Assessment and Plan:   Anne Costello is a 39 year old female with history of " nephrolithiasis, diverticulosis, and , seen at the request of Dr. Soto, presents with intermittent abdominal pain, nausea, and chills. Her symptoms became more progressive and felt overall unwell so she went to her PCP today. A CT scan of the abdomen and pelvis revealed sigmoid diverticulitis with associated phlegmon. No drainable fluid collection but concern for a potential developing coloenteric fistula. Her WBC is normal and her abdominal exam is benign. She is hemodynamically stable. No emergent surgery is indicated at this time. Recommend continued conservative management including bowel rest and IV antibiotics. Okay for clear liquids from CRS perspective. Discussed that if she were to have worsening symptoms or was not improving, we would likely repeat a CT scan to evaluate for a drainable abscess. Did briefly discuss the need for more emergent surgery which would likely entail a partial colectomy with a possible temporary stoma. Although reviewed as she looks quite well I do not suspect she will require surgery at this time. Patient understood. We will continue to follow along.    Plan:  Admit to hospitalist  Surgery: No emergent surgery indicated  Diet: Clear liquids  IV Fluids: per hospitalist  Antibiotics:  per hospitalist  Medications: Continue home meds per hospitalist  I&O s:  strict I&O s  Labs:   - Reviewed: by myself and Dr. Lima  - Ordered: None   Imaging:   - Dr. Lima and myself have personally viewed: CT abd/pelvis report. Images not yet available.   - Ordered:  None  Activity: ambulate as tolerated, encourage OOB  DVT prophylaxis: SCD s  This plan has been discussed with Dr. Lima    Patient specific identified risk factors considered as part of today s evaluation include: previous     Additional history obtained from chart review and patient.  Time spent on consultation: 45 minutes, greater than 50 percent of the total encounter time is spent in counseling and/or  coordination of care          Jyotsna Carney PA-C  [facility]  Phone:  [phone]

## 2024-02-27 NOTE — PHARMACY-ADMISSION MEDICATION HISTORY
Pharmacist Admission Medication History    Admission medication history is complete. The information provided in this note is only as accurate as the sources available at the time of the update.    Information Source(s): Patient via phone    Changes made to PTA medication list:  Added: tamsulosin - dispensed by pharmacy yesterday, but pt hasn't started yet  Deleted: old medications  Changed: None    Allergies reviewed with patient and updates made in EHR: yes    Medication History Completed By: Fernandez Christine [facility] 2/27/2024 2:46 PM    Prior to Admission medications    Medication Sig Last Dose Taking? Auth Provider Long Term End Date   tamsulosin (FLOMAX) 0.4 MG capsule Take 0.4 mg by mouth daily   Unknown, Entered By History

## 2024-02-27 NOTE — ED TRIAGE NOTES
Pt has a history of kidney stones. This has been ongoing over a year. For around a year, Pt has had this intermittent pain in her left groin. Pt had an out Pt CT result today and it demonstrated a perforation in the sigmoid colon, was instructed by her out Pt provider to come in.     Pt denies fever, chills, N/V, pain/difficulty with urination, or any pain.

## 2024-02-27 NOTE — ED NOTES
[facility]  ED Nurse Handoff Report    ED Chief complaint: Perforation in colon   . ED Diagnosis:   Final diagnoses:   Perforated diverticulum       Allergies: No Known Allergies    Code Status: Full Code    Activity level - Baseline/Home:  in bed.  Activity Level - Current:   in bed.   Lift room needed: No.   Bariatric: No   Needed: No   Isolation: No.   Infection: Not Applicable.     Respiratory status: Room air    Vital Signs (within 30 minutes):   Vitals:    02/27/24 1346 02/27/24 1347 02/27/24 1348 02/27/24 1400   BP:       Pulse:       Resp:       Temp:    98.4  F (36.9  C)   SpO2: 99% 99% 97%        Cardiac Rhythm:  ,      Pain level:    Patient confused: No.   Patient Falls Risk: patient and family education.   Elimination Status: Has voided     Patient Report - Initial Complaint: Abnormal CT.   Focused Assessment: A&O Seen by primary today for follow up for ongoing abdominal pain. Reports recent fever, chills as well. Sent to ED for perforated diverticulitis. Pt c/o minimal pain, declines needing anything for pain at this time. Colorectal sx consulted.      Abnormal Results:   Labs Ordered and Resulted from Time of ED Arrival to Time of ED Departure   BASIC METABOLIC PANEL - Abnormal       Result Value    Sodium 135      Potassium 3.7      Chloride 99      Carbon Dioxide (CO2) 25      Anion Gap 11      Urea Nitrogen 11.6      Creatinine 0.67      GFR Estimate >90      Calcium 8.9      Glucose 114 (*)    LACTIC ACID WHOLE BLOOD - Normal    Lactic Acid 1.2     PROCALCITONIN - Normal    Procalcitonin 0.13     HEPATIC FUNCTION PANEL - Normal    Protein Total 7.5      Albumin 4.1      Bilirubin Total 0.4      Alkaline Phosphatase 82      AST 16      ALT 14      Bilirubin Direct <0.20     LIPASE - Normal    Lipase 23     INFLUENZA A/B, RSV, & SARS-COV2 PCR - Normal    Influenza A PCR Negative      Influenza B PCR Negative      RSV PCR Negative      SARS CoV2 PCR Negative     CBC  WITH PLATELETS AND DIFFERENTIAL    WBC Count 8.7      RBC Count 4.43      Hemoglobin 13.5      Hematocrit 40.6      MCV 92      MCH 30.5      MCHC 33.3      RDW 12.0      Platelet Count 376      % Neutrophils 80      % Lymphocytes 15      % Monocytes 4      % Eosinophils 0      % Basophils 0      % Immature Granulocytes 1      NRBCs per 100 WBC 0      Absolute Neutrophils 7.0      Absolute Lymphocytes 1.3      Absolute Monocytes 0.4      Absolute Eosinophils 0.0      Absolute Basophils 0.0      Absolute Immature Granulocytes 0.0      Absolute NRBCs 0.0     TYPE AND SCREEN, ADULT    ABO/RH(D) O POS      Antibody Screen Negative      SPECIMEN EXPIRATION DATE [id number]     BLOOD CULTURE   BLOOD CULTURE   ABO/RH TYPE AND SCREEN        No orders to display       Treatments provided: IVabx, IVF  Family Comments: NA  OBS brochure/video discussed/provided to patient:  No  ED Medications:   Medications   HYDROmorphone (PF) (DILAUDID) injection 0.3 mg (has no administration in time range)   ondansetron (ZOFRAN) injection 4 mg (has no administration in time range)   sodium chloride 0.9% BOLUS 1,000 mL (1,000 mLs Intravenous $New Bag 2/27/24 1411)   piperacillin-tazobactam (ZOSYN) 4.5 g vial to attach to  mL bag (4.5 g Intravenous $New Bag 2/27/24 1440)       Drips infusing:  No  For the majority of the shift this patient was Green.   Interventions performed were NA.    Sepsis treatment initiated: No    Cares/treatment/interventions/medications to be completed following ED care: NA    ED Nurse Name: Shraddha Venegas RN  3:20 PM    RECEIVING UNIT ED HANDOFF REVIEW    Above ED Nurse Handoff Report was reviewed: Yes  Reviewed by: Maty Ryder RN on February 27, 2024 at 4:42 PM

## 2024-02-27 NOTE — H&P
[facility]  History and Physical  Hospitalist - Jaiden Ann DO       Date of Admission:  2/27/2024    Chief Complaint   Abdominal pain with nausea and vomiting    History is obtained from the patient, the emergency department physician, as well as electronic medical record.    History of Present Illness   Anne Costello is a 39 year old female with past medical history of nephrolithiasis who presented on 2/27/2024 with chief complaint of abdominal pain, nausea, and vomiting.  The patient states that her nausea and vomiting started on 2/10.  She also developed left lower quadrant abdominal pain.  She thought it was her kidney stone acting up.  Yesterday she went to her primary care doctor who ordered a CT abdomen and pelvis.  He returned showing concern for diverticulitis with perforation and possible coloenteric fistula.  The patient does report fevers and chills at home.  Her last bowel movement was this morning.  She has never had a colonoscopy before.  She denies any history of colon cancer in the family.    In the emergency department, the patient was found to be temperature 98.4  F, heart rate 104, blood pressure 133/85, respiratory rate 23, SpO2 94% on room air.  Initial lab work showed BMP and CBC within normal limits, lactic acid 1.2.  Blood cultures were drawn.  The patient was started on IV Zosyn and colorectal surgery was consulted to see the patient.    ASSESSMENT/PLAN    Acute Intractable Abdominal Pain, Nausea, and Vomiting  Acute Diverticulitis with Perforation and Concern for Coloenteric Fistula  - Appreciate CRS recommendations  - NPO  - IV Zosyn  - Pain control and antiemetics prn  - Recommend colonoscopy in 6-8 weeks after complete resolution of diverticulitis    Nonobstructing L 3mm Renal Calculus  - No further work up at this time    PLAN: Resume home medications as appropriate once confirmed by pharmacy.     DVT Prophylaxis: Pneumatic Compression Devices  Code Status:  Full Code  Discharge Plan:    Expected Discharge Date: 2024                 Jaiden Ann DO  Securely message with Vocera (more info)  Text page via [employer] Paging/Directory     Primary Care Physician   [facility]    -----------------------------------------------------------------------------------------------------------------------------------------------------------------------------------------------------    Past Medical History    I have reviewed this patient's medical history and updated it with pertinent information if needed.   Past Medical History:   Diagnosis Date    NO ACTIVE PROBLEMS        Past Surgical History   I have reviewed this patient's surgical history and updated it with pertinent information if needed.  Past Surgical History:   Procedure Laterality Date     SECTION N/A 3/24/2018    Procedure:  SECTION;   Section;  Surgeon: Roney Paez MD;  Location: [facility] OR    TONSILLECTOMY         Prior to Admission Medications   Prior to Admission Medications   Prescriptions Last Dose Informant Patient Reported? Taking?   tamsulosin (FLOMAX) 0.4 MG capsule   Yes No   Sig: Take 0.4 mg by mouth daily      Facility-Administered Medications: None     Allergies   No Known Allergies    Social History   I have reviewed this patient's social history and updated it with pertinent information if needed. Anne Sotoenrico  reports that she quit smoking about 8 years ago. She has never used smokeless tobacco. She reports that she does not drink alcohol and does not use drugs.    Family History   I have reviewed this patient's family history and updated it with pertinent information if needed.   No family history on file.    -----------------------------------------------------------------------------------------------------------------------------------------------------------------------------------------------------    Review of Systems   The 10 point Review of  Systems is negative other than noted in the HPI or here.     Physical Exam   Temp: 98.4  F (36.9  C)   BP: 106/73 Pulse: 64   Resp: 22 SpO2: 98 %      Vital Signs with Ranges  Temp:  [98.4  F (36.9  C)] 98.4  F (36.9  C)  Pulse:  [] 64  Resp:  [22-23] 22  BP: (106-133)/(73-85) 106/73  SpO2:  [94 %-99 %] 98 %  0 lbs 0 oz    Constitutional: Awake, alert, cooperative, no apparent distress.  Eyes: Conjunctiva and pupils examined and normal.  HEENT: Moist mucous membranes, normal dentition.  Respiratory: Clear to auscultation bilaterally, no crackles or wheezing.  Cardiovascular: Regular rate and rhythm, normal S1 and S2, and no murmur noted.  GI: Soft, non-distended, non-tender, normal bowel sounds.  Lymph/Hematologic: No anterior cervical or supraclavicular adenopathy.  Skin: No rashes, no cyanosis, no edema.  Musculoskeletal: No joint swelling, erythema or tenderness.  Neurologic: Cranial nerves 2-12 intact, normal strength and sensation.  Psychiatric: Alert, oriented to person, place and time, no obvious anxiety or depression.     Data     Recent Labs   Lab 02/27/24  1346   WBC 8.7   HGB 13.5   MCV 92         POTASSIUM 3.7   CHLORIDE 99   CO2 25   BUN 11.6   CR 0.67   ANIONGAP 11   TJ 8.9   *   ALBUMIN 4.1   PROTTOTAL 7.5   BILITOTAL 0.4   ALKPHOS 82   ALT 14   AST 16   LIPASE 23       Recent Results (from the past 24 hour(s))   US Upper Extremity Lt Soft Tissue    Impression    COMPARISON:  None.    FINDINGS: Selected images of the subcutaneous tissues of the posterior left elbow are presented for interpretation.     There is a 3.1 x 3.5 x 1.2 cm thick-walled fluid collection within the subcutaneous tissues dorsal to the elbow which contains septations and debris. No significant peripheral or internal vascularity.     There is a questionable partially visualized second fluid collection within the muscles of the lateral elbow suggesting possible tenosynovitis.    IMPRESSION:   1. 3.1 x 3.5  x 1.2 cm complex fluid collection within the subcutaneous fat dorsal to the elbow. Nonspecific and most suggestive of olecranon bursitis. Hematoma less likely. Abscess felt unlikely in the absence of strong clinical concern for infectious process.    2. Findings suggesting a partially visualized second fluid collection within the soft tissues lateral to the elbow suggesting possible tenosynovitis.    Dedicated MRI would be beneficial for confirmation of these findings. Otherwise, recommend follow-up if findings persist or pain worsens.   CT Abdomen Pelvis w Contrast    Impression    COMPARISON:  CT 01/26/2023    TECHNIQUE:  Images were obtained through the abdomen and pelvis following the administration of 75 mL IOHEXOL 350 MG/ML IV SOLN IV contrast.    FINDINGS:    LOWER CHEST: Unremarkable.    LIVER: Unremarkable.    GALLBLADDER AND BILIARY TREE: Unremarkable. No intrahepatic or extrahepatic biliary ductal dilation.     PANCREAS: Unremarkable.    SPLEEN: Unremarkable.    ADRENALS: Unremarkable.    KIDNEYS, URETERS, AND BLADDER: 3 mm nonobstructing left renal calculus. No additional urolithiasis. Kidneys otherwise unremarkable. Ureters and urinary bladder unremarkable.    VESSELS: No aneurysm.    BOWEL: Moderate colonic wall thickening and pericolonic inflammatory fat stranding compatible with diverticulitis. There is ill-defined masslike soft tissue in the left pelvis which contains a punctate focus of extraluminal free air compatible with sequelae of perforation and phlegmon. No mature abscess.    There is additional bandlike soft tissue extending medially from the sigmoid colon to a small bowel loop on images 70-71 suggesting evolving coloenteric fistula.     Remainder of the bowel is unremarkable. Appendix is normal.      REPRODUCTIVE ORGANS: Aforementioned left pelvic inflation extends inferiorly to abut the left ovary but there is no evidence for tubo-ovarian abscess. No worrisome pelvic  lesions.    MESENTERY/PERITONEUM: A few punctate foci proximal free air in the pelvis as described above. No significant ascites. No adenopathy.    RETROPERITONEUM: No adenopathy.     ABDOMINAL WALL/SOFT TISSUES: Unremarkable.    BONES: Unremarkable.    IMPRESSION:    1. Sigmoid diverticulitis with evidence for perforation and peridiverticular phlegmon. No mature, drainable abscess. There is also suggestion of evolving coloenteric fistula. No definite mature fistula.    2. No additional acute findings.    Results paged to MARK ANTHONY HO on 2/27/2024 12:24 PM.

## 2024-02-27 NOTE — ED PROVIDER NOTES
History     Chief Complaint:  Perforation in colon        HPI   Anne Costello is a 39 year old female with history of nephrolithiasis, diverticulosis, and  who presents to the ED for evaluation of perforation in colon. The patient reports a recent sickness that began approximately 3 weeks ago. She reports fever, chills, vomiting, nausea, and severe constipation at this time, around 2/10/24. Endorses sick contacts, recent fever, chills, and fatigue Christoph into yesterday. She reports cough, runny nose, minor sore throat, and softer stool than normal. Endorses nausea at work and 2 episodes of emesis prior to presentation at the ED. Denies abdominal pain or dysuria. Of note, the patient visited her primary care at [facility] where they performed a CAT scan and found a perforated diverticula. She reports a kidney stone diagnosis  and receiving Flomax at this time with no relief.     Independent Historian:   None - Patient Only    Review of External Notes:   23 office visit    CT abdomen/pelvis: IMPRESSION:  IMPRESSION:    1. Sigmoid diverticulitis with evidence for perforation and peridiverticular phlegmon. No mature, drainable abscess. There is also suggestion of evolving coloenteric fistula. No definite mature fistula.     Medications:    Oxycodone   Flomax    Past Medical History:    Diverticulosis  Hiatal hernia  Nephrolithiasis     Past Surgical History:      Tonsillectomy     Physical Exam   Patient Vitals for the past 24 hrs:   BP Temp Pulse Resp SpO2   24 1400 -- 98.4  F (36.9  C) -- -- --   24 1348 -- -- -- -- 97 %   24 1347 -- -- -- -- 99 %   24 1346 -- -- -- -- 99 %   24 1345 -- -- -- -- 99 %   24 1344 133/85 -- -- -- 97 %   24 1343 133/85 -- 108 -- --   24 1323 -- -- -- 22 96 %   24 1322 -- -- 104 23 94 %        Physical Exam  Nursing note and vitals reviewed.  Constitutional: Well nourished.  Eyes: Conjunctiva normal.   Pupils are equal, round, and reactive to light.   ENT: Nose normal. Mucous membranes pink and moist.    Neck: Normal range of motion.  CVS: Sinus tachycardia.  Normal heart sounds.   Pulmonary: Lungs clear to auscultation bilaterally. No wheezes/rales/rhonchi.  GI: Abdomen soft. Mild lower abdominal tenderness. No rigidity or guarding. No CVA tenderness   MSK: No calf tenderness or swelling.  Neuro: Alert. Follows simple commands.  Skin: Skin is warm and dry. No rash noted.   Psychiatric: Normal affect.       Emergency Department Course     Imaging:  No orders to display       Laboratory:  Labs Ordered and Resulted from Time of ED Arrival to Time of ED Departure   BASIC METABOLIC PANEL - Abnormal       Result Value    Sodium 135      Potassium 3.7      Chloride 99      Carbon Dioxide (CO2) 25      Anion Gap 11      Urea Nitrogen 11.6      Creatinine 0.67      GFR Estimate >90      Calcium 8.9      Glucose 114 (*)    LACTIC ACID WHOLE BLOOD - Normal    Lactic Acid 1.2     PROCALCITONIN - Normal    Procalcitonin 0.13     HEPATIC FUNCTION PANEL - Normal    Protein Total 7.5      Albumin 4.1      Bilirubin Total 0.4      Alkaline Phosphatase 82      AST 16      ALT 14      Bilirubin Direct <0.20     LIPASE - Normal    Lipase 23     CBC WITH PLATELETS AND DIFFERENTIAL    WBC Count 8.7      RBC Count 4.43      Hemoglobin 13.5      Hematocrit 40.6      MCV 92      MCH 30.5      MCHC 33.3      RDW 12.0      Platelet Count 376      % Neutrophils 80      % Lymphocytes 15      % Monocytes 4      % Eosinophils 0      % Basophils 0      % Immature Granulocytes 1      NRBCs per 100 WBC 0      Absolute Neutrophils 7.0      Absolute Lymphocytes 1.3      Absolute Monocytes 0.4      Absolute Eosinophils 0.0      Absolute Basophils 0.0      Absolute Immature Granulocytes 0.0      Absolute NRBCs 0.0     INFLUENZA A/B, RSV, & SARS-COV2 PCR   TYPE AND SCREEN, ADULT    ABO/RH(D) O POS      Antibody Screen Negative      SPECIMEN EXPIRATION  DATE [id number]     BLOOD CULTURE   BLOOD CULTURE   ABO/RH TYPE AND SCREEN        Procedures   None    Emergency Department Course & Assessments:    Interventions:  Medications   sodium chloride 0.9% BOLUS 1,000 mL (1,000 mLs Intravenous $New Bag 2/27/24 1411)   HYDROmorphone (PF) (DILAUDID) injection 0.3 mg (has no administration in time range)   ondansetron (ZOFRAN) injection 4 mg (has no administration in time range)   piperacillin-tazobactam (ZOSYN) 4.5 g vial to attach to  mL bag (4.5 g Intravenous $New Bag 2/27/24 1440)        Assessments:  1358 I obtained patient history and performed a physical exam.   1444 I rechecked the patient and explained findings.     Independent Interpretation (X-rays, CTs, rhythm strip):  None    Consultations/Discussion of Management or Tests:  1443 I spoke with RIZWANA Ortega, colorectal,  regarding the patient's history and presentation in the emergency department today.       Social Determinants of Health affecting care:   None    Disposition:  The patient was signed out to Dr. Kidd pending admission    Impression & Plan      Medical Decision Making:  Patient is a 39-year-old female presenting with abnormal CT finding.  She has otherwise nontoxic though mildly tachycardic on arrival.  CT from outside facility shows concerns for perforated diverticulitis.  There is also an evolving coloenteric fistula.  Patient is overall not septic appearing. Lactate normal. Her pain was controlled during her time in the ED.  She is n.p.o. and was given a dose of IV Zosyn.  Blood cultures were sent.  I did speak to colorectal team who was in agreement to assess the patient.  Patient is pending acceptance for admission.     Diagnosis:    ICD-10-CM    1. Perforated diverticulum  K57.80            Discharge Medications:  New Prescriptions    No medications on file          Scribe Disclosure:  Treva LIM, am serving as a scribe at 2:30 PM on 2/27/2024 to document services  personally performed by Melissa Soto DO based on my observations and the provider's statements to me.   2/27/2024   Melissa Soto DO McDonald, Lindsey E, DO  02/27/24 1523

## 2024-02-28 LAB
ANION GAP SERPL CALCULATED.3IONS-SCNC: 8 MMOL/L (ref 7–15)
BUN SERPL-MCNC: 8.6 MG/DL (ref 6–20)
CALCIUM SERPL-MCNC: 8.5 MG/DL (ref 8.6–10)
CHLORIDE SERPL-SCNC: 106 MMOL/L (ref 98–107)
CREAT SERPL-MCNC: 0.68 MG/DL (ref 0.51–0.95)
DEPRECATED HCO3 PLAS-SCNC: 24 MMOL/L (ref 22–29)
EGFRCR SERPLBLD CKD-EPI 2021: >90 ML/MIN/1.73M2
ERYTHROCYTE [DISTWIDTH] IN BLOOD BY AUTOMATED COUNT: 12.1 % (ref 10–15)
GLUCOSE SERPL-MCNC: 100 MG/DL (ref 70–99)
HCT VFR BLD AUTO: 34 % (ref 35–47)
HGB BLD-MCNC: 11.1 G/DL (ref 11.7–15.7)
MCH RBC QN AUTO: 30.2 PG (ref 26.5–33)
MCHC RBC AUTO-ENTMCNC: 32.6 G/DL (ref 31.5–36.5)
MCV RBC AUTO: 92 FL (ref 78–100)
PLATELET # BLD AUTO: 277 10E3/UL (ref 150–450)
POTASSIUM SERPL-SCNC: 4.2 MMOL/L (ref 3.4–5.3)
RBC # BLD AUTO: 3.68 10E6/UL (ref 3.8–5.2)
SODIUM SERPL-SCNC: 138 MMOL/L (ref 135–145)
WBC # BLD AUTO: 5.8 10E3/UL (ref 4–11)

## 2024-02-28 PROCEDURE — 120N000004 HC R&B MS OVERFLOW

## 2024-02-28 PROCEDURE — 99232 SBSQ HOSP IP/OBS MODERATE 35: CPT | Performed by: INTERNAL MEDICINE

## 2024-02-28 PROCEDURE — 258N000003 HC RX IP 258 OP 636: Performed by: INTERNAL MEDICINE

## 2024-02-28 PROCEDURE — 85027 COMPLETE CBC AUTOMATED: CPT | Performed by: INTERNAL MEDICINE

## 2024-02-28 PROCEDURE — 36415 COLL VENOUS BLD VENIPUNCTURE: CPT | Performed by: INTERNAL MEDICINE

## 2024-02-28 PROCEDURE — 82374 ASSAY BLOOD CARBON DIOXIDE: CPT | Performed by: INTERNAL MEDICINE

## 2024-02-28 PROCEDURE — 250N000011 HC RX IP 250 OP 636: Performed by: INTERNAL MEDICINE

## 2024-02-28 RX ADMIN — PIPERACILLIN AND TAZOBACTAM 4.5 G: 4; .5 INJECTION, POWDER, FOR SOLUTION INTRAVENOUS at 20:24

## 2024-02-28 RX ADMIN — SODIUM CHLORIDE, POTASSIUM CHLORIDE, SODIUM LACTATE AND CALCIUM CHLORIDE: 600; 310; 30; 20 INJECTION, SOLUTION INTRAVENOUS at 10:09

## 2024-02-28 RX ADMIN — SODIUM CHLORIDE, POTASSIUM CHLORIDE, SODIUM LACTATE AND CALCIUM CHLORIDE: 600; 310; 30; 20 INJECTION, SOLUTION INTRAVENOUS at 21:21

## 2024-02-28 RX ADMIN — PIPERACILLIN AND TAZOBACTAM 4.5 G: 4; .5 INJECTION, POWDER, FOR SOLUTION INTRAVENOUS at 08:16

## 2024-02-28 RX ADMIN — PIPERACILLIN AND TAZOBACTAM 4.5 G: 4; .5 INJECTION, POWDER, FOR SOLUTION INTRAVENOUS at 13:36

## 2024-02-28 RX ADMIN — PIPERACILLIN AND TAZOBACTAM 4.5 G: 4; .5 INJECTION, POWDER, FOR SOLUTION INTRAVENOUS at 02:04

## 2024-02-28 ASSESSMENT — ACTIVITIES OF DAILY LIVING (ADL)
ADLS_ACUITY_SCORE: 20

## 2024-02-28 NOTE — PROGRESS NOTES
COLON & RECTAL SURGERY  PROGRESS NOTE    February 28, 2024    SUBJECTIVE:  Doing well. Pain controlled. Had 2 Bms. AVSS. AM labs pending.    OBJECTIVE:  Temp:  [97.4  F (36.3  C)-98.4  F (36.9  C)] 97.4  F (36.3  C)  Pulse:  [] 68  Resp:  [14-23] 14  BP: (106-133)/(65-92) 107/65  SpO2:  [94 %-99 %] 94 %  No intake or output data in the 24 hours ending 02/28/24 0726    GENERAL:  Awake, alert, no acute distress,   HEAD: Nomocephalic atraumatic  SCLERA: anicteric  EXTREMITIES: warm and well perfused  ABDOMEN:  Soft, non- tender, non-distended, no rebound or guarding, no peritoneal signs      LABS:  Lab Results   Component Value Date    WBC 8.7 02/27/2024    WBC 7.9 01/10/2018     Lab Results   Component Value Date    HGB 13.5 02/27/2024    HGB 10.4 03/26/2018     Lab Results   Component Value Date    HCT 40.6 02/27/2024    HCT 34.2 01/10/2018     Lab Results   Component Value Date     02/27/2024     01/10/2018     Last Basic Metabolic Panel:  Lab Results   Component Value Date     02/27/2024      Lab Results   Component Value Date    POTASSIUM 3.7 02/27/2024     Lab Results   Component Value Date    CHLORIDE 99 02/27/2024     Lab Results   Component Value Date    TJ 8.9 02/27/2024     Lab Results   Component Value Date    CO2 25 02/27/2024     Lab Results   Component Value Date    BUN 11.6 02/27/2024     Lab Results   Component Value Date    CR 0.67 02/27/2024     Lab Results   Component Value Date     02/27/2024       ASSESSMENT/PLAN: Anne Costello is a 39-year-old woman admitted on 2/27 for perforated diverticulitis.    - Advance to clears  - Continue IV ABX  - Pain management, minimize narcotics  - Encourage ambulation  - No plans for surgery at this time  - Consider repeat CT scan tomorrow         For questions/paging, please contact the CRS office at [phone].    Sandeep Dumont PA-C  [facility]  Phone: [phone]     [facility] Staff  I  performed a history and physical examination of the patient and discussed their management with the physician assistant. I reviewed the physician assistants note and agree with the documented findings and plan of care.     Agree with above, no pain.  2 BMs this am.   Plan for repeat imaging tomorrow.  If no abscess and clinically improved should be able to discharge home with follow-up with us in clinic and colonoscopy in 6-8 weeks.    Carolyn Lima MD, FACS, FASCRS    [facility]  [address] [address]  T: [phone]  F: [phone]

## 2024-02-28 NOTE — PROGRESS NOTES
[facility]  Hospitalist Progress Note  Charly Hilliard MD 02/28/2024    Reason for Stay (Diagnosis): Complicated diverticulitis         Assessment and Plan:      Summary of Stay: Anne Costello is a 39 year old female with PMH of nephrolithiasis who presented on 2/27/2024 with chief complaint of abdominal pain, nausea, and vomiting and found to have complicated diverticulitis and admitted on 2/27/2024.      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  -Continue IV antibiotic Zosyn.  -Colorectal surgery consulted input appreciated started patient on clears.  -Noted recommendation to consider repeating CT scan tomorrow.  -Continue gentle hydration.  -Electrolytes are normal, lactate 1.2, Pro-Eldon 0.13.    Nephrolithiasis.  -This is nonobstructive left renal stone, 3 mm.  -No sign of obstruction.        Clinically Significant Risk Factors Present on Admission                                    DVT Prophylaxis: Pneumatic Compression Devices and Ambulate every shift  Code Status: Full Code  Discharge Dispo: Home  Estimated Disch Date / # of Days until Disch:2 more days in hospital.    I discussed with patient at length the plan of care, all his questions and concerns addressed.        Interval History (Subjective):      Patient seen and examined, assumed care today, H&P, labs, medications reviewed. She feels better, no N/V or diarrhea. She has lower abdominal pain/ discomfort which seem to be improving.                  Physical Exam:      Last Vital Signs:  /65 (BP Location: Right arm)   Pulse 68   Temp 97.4  F (36.3  C) (Oral)   Resp 14   LMP 02/25/2024 (Exact Date)   SpO2 94%     No intake/output data recorded.  There were no vitals filed for this visit.  Current Facility-Administered Medications   Medication    acetaminophen (TYLENOL) tablet 650 mg    Or    acetaminophen (TYLENOL) Suppository 650 mg    calcium carbonate  (TUMS) chewable tablet 1,000 mg    HYDROmorphone (DILAUDID) half-tab 1 mg    HYDROmorphone (DILAUDID) injection 0.2 mg    HYDROmorphone (DILAUDID) injection 0.4 mg    HYDROmorphone (DILAUDID) tablet 2 mg    lactated ringers infusion    lidocaine (LMX4) cream    lidocaine 1 % 0.1-1 mL    melatonin tablet 5 mg    naloxone (NARCAN) injection 0.2 mg    Or    naloxone (NARCAN) injection 0.4 mg    Or    naloxone (NARCAN) injection 0.2 mg    Or    naloxone (NARCAN) injection 0.4 mg    ondansetron (ZOFRAN ODT) ODT tab 4 mg    Or    ondansetron (ZOFRAN) injection 4 mg    piperacillin-tazobactam (ZOSYN) 4.5 g vial to attach to  mL bag    prochlorperazine (COMPAZINE) injection 10 mg    Or    prochlorperazine (COMPAZINE) tablet 10 mg    Or    prochlorperazine (COMPAZINE) suppository 25 mg    sodium chloride (PF) 0.9% PF flush 3 mL    sodium chloride (PF) 0.9% PF flush 3 mL       Constitutional: Awake, alert, cooperative, no apparent distress     Respiratory: Clear to auscultation bilaterally, no crackles or wheezing   Cardiovascular: Regular rate and rhythm, normal S1 and S2, and no murmur noted   Abdomen: Normal bowel sounds, soft, non-distended, non-tender   Skin: No rashes, no cyanosis, dry to touch   Neuro: Alert and oriented x3, no weakness, numbness, memory loss   Extremities: No edema, normal range of motion   Other(s):HEENT Pink, un icteric, moist mucosa.       All other systems: Negative          Medications:      All current medications were reviewed with changes reflected in problem list.         Data:      All new lab and imaging data was reviewed.   Labs:  Recent Labs   Lab 02/28/24  0711 02/27/24  1346    135   POTASSIUM 4.2 3.7   CHLORIDE 106 99   CO2 24 25   ANIONGAP 8 11   * 114*   BUN 8.6 11.6   CR 0.68 0.67   GFRESTIMATED >90 >90   TJ 8.5* 8.9     Recent Labs   Lab 02/28/24  0711 02/27/24  1346   WBC 5.8 8.7   HGB 11.1* 13.5   HCT 34.0* 40.6   MCV 92 92    376     Recent Labs   Lab  02/28/24  0711 02/27/24  1346   [facility] 100* 114*      Imaging:   Results for orders placed or performed during the hospital encounter of 01/26/23   Abd/pelvis CT no contrast - Stone Protocol    Narrative    EXAM: CT ABDOMEN PELVIS W/O CONTRAST  LOCATION: [facility]  DATE/TIME: 1/26/2023 2:43 AM    INDICATION: Colicky left abdominal pain. Evaluate for ureteral stone.  COMPARISON: CT abdomen and pelvis without IV contrast 1/13/2008.  TECHNIQUE: CT scan of the abdomen and pelvis was performed without IV contrast. Multiplanar reformats were obtained. Dose reduction techniques were used.  CONTRAST: None.    FINDINGS:   LOWER CHEST: Minor strands of linear atelectasis in the lower lungs identified today. No pleural effusion on either side. Normal cardiac size. No pericardial effusion. Tiny hiatal hernia.    HEPATOBILIARY: No discrete lesion, calcified gallstones or biliary dilatation.    PANCREAS: Normal.    SPLEEN: Normal.    ADRENAL GLANDS: Normal.    KIDNEYS/BLADDER: Tiny 0.3 cm stone in the lower pole of the left kidney (image 90, series 2, image 62, series 3), unchanged. No additional urinary tract calculi. No hydronephrosis or hydroureter on either side. Normal urinary bladder. No significant   change.    BOWEL: Scattered colonic diverticulosis, more apparent distally, without acute inflammation. No mechanical bowel obstruction, free gas or free fluid. Normal appendix. Tiny hiatal hernia.    LYMPH NODES: No suspicious abdominopelvic adenopathy.    VASCULATURE: Normal caliber abdominal aorta and the IVC.    PELVIC ORGANS: Anteverted uterus. Follicles in the ovaries. No suspicious adenopathy. Resolved small amount of dependent pelvic free fluid demonstrated previously.    MUSCULOSKELETAL: Anatomic alignment of the spine, bones and joints of the pelvis. Tiny fat-containing ventral umbilical hernia.      Impression    IMPRESSION:   1.  Tiny stone in the lower pole of the left kidney, unchanged. No  additional urinary tract calculi. No obstruction on either side, unchanged.    2.  Scattered colonic diverticulosis, more apparent distally, without acute inflammation, obstruction, free gas or free fluid. Normal appendix. Tiny hiatal hernia. Tiny fat-containing ventral umbilical hernia.    3.  Resolved small amount of dependent pelvic free fluid demonstrated on prior study.     US Pelvis Cmplt w Transvag & Doppler LmtPel Duplex Limited    Narrative    EXAM: US PELVIS COMPLETE W TRANSVAGINAL AND DOPPLER LIMITED  LOCATION: [facility]  DATE/TIME: 1/26/2023 3:11 AM    INDICATION: intermittent severe L pelvic pain   eval torsion, cyst, etc.  COMPARISON: None.  TECHNIQUE: Transabdominal scans were performed. Endovaginal ultrasound was performed to better visualize the adnexa. Color flow with spectral Doppler and waveform analysis performed.    FINDINGS:    UTERUS: 11 x 6.3 x 5.2 cm. Normal in size and position with no masses.    ENDOMETRIUM: 12 mm. Normal smooth endometrium. Trace free fluid is seen within the cervical canal.    RIGHT OVARY: 3.5 x 2.6 x 2.3 cm. Normal with arterial and venous duplex flow identified.    LEFT OVARY: 2.9 x 2.4 x 2.4 cm. Normal with arterial and venous duplex flow identified.    No significant free fluid.      Impression    IMPRESSION:    1.  Sonographically unremarkable uterus and ovaries.  2.  Trace free fluid in the cervical canal

## 2024-02-28 NOTE — PLAN OF CARE
Goal Outcome Evaluation:    Vitals:. VSS. Afebrile. Independent in room. Pt rested comfortably between cares.   Resp: WDL.  LS clear and equal   GI/: abdomen non distended,soft non tender. Reports passing flatus, BS active and audible. Denies N/V. Pt NPO.  IV: WDL dressing c/d/i, infusing LR @100 ml/hr  Pain/Comfort: denies pain throughout shift  Skin: WDL  Social: coping effectively   Plan: maryjane rectal consult in AM, IV antibiotics, manage pain and nausea

## 2024-02-28 NOTE — PLAN OF CARE
Goal Outcome Evaluation:      Plan of Care Reviewed With: patient    Overall Patient Progress: improvingOverall Patient Progress: improving     Pt A&Ox4, up independently in room. No c/o abdominal pain, passing gas and stool. Advanced to full liquid diet for time being, will be NPO @ midnight for repeat CT scan in am. PIV infusing. Plan to continue IV zosyn and repeat CT scan.

## 2024-02-28 NOTE — PLAN OF CARE
Goal Outcome Evaluation:    [id number]    Vital Signs: VSS on RA. Afebrile.  Pain/Comfort: denies pain  Assessment: LS clear bilaterally. Abdomen soft and nontender. Denies N/V. BS active   Diet: tolerating clears.  Output: voiding  Activity/Ambulation: ambulating in room independently.  Plan: Continue with IV abx. Continue with pain management. Repeat CT tomorrow.

## 2024-02-29 ENCOUNTER — APPOINTMENT (OUTPATIENT)
Dept: CT IMAGING | Facility: CLINIC | Age: 39
End: 2024-02-29
Attending: PHYSICIAN ASSISTANT
Payer: COMMERCIAL

## 2024-02-29 VITALS
DIASTOLIC BLOOD PRESSURE: 61 MMHG | RESPIRATION RATE: 16 BRPM | OXYGEN SATURATION: 95 % | TEMPERATURE: 98.1 F | SYSTOLIC BLOOD PRESSURE: 106 MMHG | HEART RATE: 47 BPM

## 2024-02-29 PROCEDURE — 258N000003 HC RX IP 258 OP 636: Performed by: INTERNAL MEDICINE

## 2024-02-29 PROCEDURE — 99238 HOSP IP/OBS DSCHRG MGMT 30/<: CPT | Performed by: INTERNAL MEDICINE

## 2024-02-29 PROCEDURE — 74177 CT ABD & PELVIS W/CONTRAST: CPT

## 2024-02-29 PROCEDURE — 250N000011 HC RX IP 250 OP 636: Performed by: INTERNAL MEDICINE

## 2024-02-29 PROCEDURE — 250N000009 HC RX 250: Performed by: INTERNAL MEDICINE

## 2024-02-29 RX ORDER — ACETAMINOPHEN 325 MG/1
650 TABLET ORAL EVERY 4 HOURS PRN
COMMUNITY
Start: 2024-02-29

## 2024-02-29 RX ORDER — IOPAMIDOL 755 MG/ML
500 INJECTION, SOLUTION INTRAVASCULAR ONCE
Status: COMPLETED | OUTPATIENT
Start: 2024-02-29 | End: 2024-02-29

## 2024-02-29 RX ORDER — SENNA AND DOCUSATE SODIUM 50; 8.6 MG/1; MG/1
1 TABLET, FILM COATED ORAL 2 TIMES DAILY PRN
Qty: 40 TABLET | Refills: 0 | Status: ON HOLD | OUTPATIENT
Start: 2024-02-29 | End: 2024-04-21

## 2024-02-29 RX ADMIN — SODIUM CHLORIDE, POTASSIUM CHLORIDE, SODIUM LACTATE AND CALCIUM CHLORIDE: 600; 310; 30; 20 INJECTION, SOLUTION INTRAVENOUS at 09:30

## 2024-02-29 RX ADMIN — SODIUM CHLORIDE 63 ML: 9 INJECTION, SOLUTION INTRAVENOUS at 08:26

## 2024-02-29 RX ADMIN — PIPERACILLIN AND TAZOBACTAM 4.5 G: 4; .5 INJECTION, POWDER, FOR SOLUTION INTRAVENOUS at 08:43

## 2024-02-29 RX ADMIN — PIPERACILLIN AND TAZOBACTAM 4.5 G: 4; .5 INJECTION, POWDER, FOR SOLUTION INTRAVENOUS at 13:19

## 2024-02-29 RX ADMIN — IOPAMIDOL 91 ML: 755 INJECTION, SOLUTION INTRAVENOUS at 08:25

## 2024-02-29 RX ADMIN — PIPERACILLIN AND TAZOBACTAM 4.5 G: 4; .5 INJECTION, POWDER, FOR SOLUTION INTRAVENOUS at 02:32

## 2024-02-29 ASSESSMENT — ACTIVITIES OF DAILY LIVING (ADL)
ADLS_ACUITY_SCORE: 20

## 2024-02-29 NOTE — PLAN OF CARE
Goal Outcome Evaluation:       Patient vital signs are at baseline: Yes. Denying pain.  Patient able to ambulate as they were prior to admission or with assist devices provided by therapies during their stay:  Yes  Patient MUST void prior to discharge:  Yes  Patient able to tolerate oral intake:  Yes. Tolerating low fiber diet.   Pain has adequate pain control using Oral analgesics:  Yes. Tylenol effective for pain control.   Does patient have an identified :  Yes  Has goal D/C date and time been discussed with patient:  Yes. Discharging home with oral antibiotics.

## 2024-02-29 NOTE — PLAN OF CARE
Temp: 97.4  F (36.3  C) Temp src: Oral   Pulse: 68   Resp: 14 SpO2: 94 % O2 Device: None (Room air)      Pt is A&Ox4. Denies pain. Up independent in room. Voiding without difficulty. Tolerating full liquid diet. PIV infusing.   Plan- Colorectal following. Will be NPO at midnight for CT in am. IV ABX.

## 2024-02-29 NOTE — DISCHARGE SUMMARY
[facility]  Discharge Summary  Name: Anne Costello    MRN: [id number]  YOB: 1985    Age: 39 year old  Date of Discharge:  2/29/2024  Date of Admission: 2/27/2024  Primary Care Provider: Clinic, [facility]  Discharge Physician:  Charly Hilliard M.D  Discharging Service:  Hospitalist      Discharge Diagnosis:  Anne Costello is a 39 year old female with PMH of nephrolithiasis who presented on 2/27/2024 with chief complaint of abdominal pain, nausea, and vomiting and found to have complicated diverticulitis and admitted on 2/27/2024.      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  -Patient was treated with IV antibiotics Zosyn in the hospital, evaluated by colorectal surgery, CT scan of the abdomen pelvis repeated, and there was no significant change or worsening.  CRS advancing her diet and the patient tolerated.  She recommended to discharge patient home on oral antibiotics and she was discharged home with 2 weeks of Augmentin.  Patient will have colonoscopy in 6 to 8 weeks.  She was advised on worsening symptoms and not to mask with pain medications and to come to the emergency department for worsening abdominal pain, nausea, vomiting or fever.  -Colorectal surgery consulted input appreciated started patient on clears.  -Overall she remained stable and discharged       2.  Nephrolithiasis.  -There is nonobstructive left renal stone, about 3 mm in size, no sign of obstruction, will remain on Flomax .encouraged to increase her fluid intake.  .     Other Diagnosis:  none     Discharge Disposition:  Discharged to home     Allergies:  No Known Allergies     Discharge Medications:   Current Discharge Medication List        START taking these medications    Details   acetaminophen (TYLENOL) 325 MG tablet Take 2 tablets (650 mg) by mouth every 4 hours as needed for mild pain or other (and adjunct with moderate or  severe pain or per patient request)    Associated Diagnoses: Perforated diverticulum      amoxicillin-clavulanate (AUGMENTIN) 875-125 MG tablet Take 1 tablet by mouth 2 times daily  Qty: 28 tablet, Refills: 0    Associated Diagnoses: Perforated diverticulum      SENNA-docusate sodium (SENNA S) 8.6-50 MG tablet Take 1 tablet by mouth 2 times daily as needed (constipation)  Qty: 40 tablet, Refills: 0    Associated Diagnoses: Perforated diverticulum           CONTINUE these medications which have NOT CHANGED    Details   tamsulosin (FLOMAX) 0.4 MG capsule Take 0.4 mg by mouth daily              Condition on Discharge:  Discharge condition: Stable   Discharge vitals: Blood pressure 106/61, pulse (!) 47, temperature 98.1  F (36.7  C), temperature source Oral, resp. rate 16, last menstrual period 02/25/2024, SpO2 95%, unknown if currently breastfeeding.   Code status on discharge: Full Code     History of Illness:  See detailed admission note for full details.    Significant Physical Exam Findings:    Constitutional: Awake, alert, cooperative, no apparent distress      Respiratory: Clear to auscultation bilaterally, no crackles or wheezing   Cardiovascular: Regular rate and rhythm, normal S1 and S2, and no murmur noted   Abdomen: Normal bowel sounds, soft, non-distended, non-tender   Skin: No rashes, no cyanosis, dry to touch   Neuro: Alert and oriented x3, no weakness, numbness, memory loss   Extremities: No edema, normal range of motion   Other(s):HEENT Pink, un icteric, moist mucosa.         All other systems: Negat       Procedures other than Imaging:  none     Imaging:  Results for orders placed or performed during the hospital encounter of 02/27/24   CT Abdomen Pelvis w Contrast    Narrative    CT ABDOMEN AND PELVIS WITH CONTRAST 2/29/2024 8:36 AM    CLINICAL HISTORY: Perforated diverticulitis with phlegmon.    TECHNIQUE: CT scan of the abdomen and pelvis was performed following  injection of IV contrast.  Multiplanar reformats were obtained. Dose  reduction techniques were used.  CONTRAST: 91mL Isovue-370    COMPARISON: CT 2/27/2024.    FINDINGS:   LOWER CHEST: Mild bibasilar atelectasis.    HEPATOBILIARY: Normal.    PANCREAS: Normal.    SPLEEN: Normal.    ADRENAL GLANDS: Normal.    KIDNEYS/BLADDER: No hydronephrosis. Stable small nonobstructing stone  lower left kidney. Bladder unremarkable.    BOWEL: Continued inflammatory change and wall thickening at the  sigmoid colon compatible with diverticulitis. There is no significant  interval change identified. A few small bubbles of extraluminal gas  appear similar to prior lying posteriorly. Stable appearance of  phlegmon in this region. No new abscess identified. No bowel  obstruction. Normal appendix.    PELVIC ORGANS: No new pelvic abnormality.    ADDITIONAL FINDINGS: None.    MUSCULOSKELETAL: Normal.      Impression    IMPRESSION: Stable appearance of contained perforated sigmoid  diverticulitis compared to 2/27/2024. Stable appearance of adjacent  phlegmon and tiny bubbles of extraluminal gas. No new abscess can be  seen.    KESHAV DRAKE MD         SYSTEM ID:  [id number]        Consultations:  Consultation during this admission received from surgery.     Recent Lab Results:  Recent Labs   Lab 02/28/24  0711 02/27/24  1346   WBC 5.8 8.7   HGB 11.1* 13.5   HCT 34.0* 40.6   MCV 92 92    376     Recent Labs   Lab 02/28/24  0711 02/27/24  1346    135   POTASSIUM 4.2 3.7   CHLORIDE 106 99   CO2 24 25   ANIONGAP 8 11   * 114*   BUN 8.6 11.6   CR 0.68 0.67   GFRESTIMATED >90 >90   TJ 8.5* 8.9          Pending Results:    Unresulted Labs Ordered in the Past 30 Days of this Admission       Date and Time Order Name Status Description    2/27/2024  1:48 PM Blood Culture Arm, Right Preliminary     2/27/2024  1:26 PM Blood Culture Peripheral Blood Preliminary                 Reason for your hospital stay    Complicated diverticulitis     Follow-up and  recommended labs and tests     Follow up with primary care provider, [facility], within 7 days for hospital follow- up.  The following labs/tests are recommended: CBC, BMP 1 week, result to PCP.  Follow up with Colorectal Surgery in 2 weeks, as instructed.  Colonoscopy in 6-8     Activity    Your activity upon discharge: activity as tolerated     Diet    Follow this diet upon discharge: Orders Placed This Encounter      Low Fiber Diet for 2 weeks then advance diet to regular diet.          Total time spent in face to face contact with the patient and coordinating discharge was:  <30 Minutes.

## 2024-02-29 NOTE — PROGRESS NOTES
[facility]  PROGRESS NOTE    February 29, 2024    SUBJECTIVE:  Doing very well. Denies pain, N/V. Tolerating fulls. AVSS.     OBJECTIVE:  Temp:  [97.4  F (36.3  C)-98.1  F (36.7  C)] 98.1  F (36.7  C)  Pulse:  [47-60] 47  Resp:  [14-16] 16  BP: (101-106)/(58-61) 106/61  SpO2:  [95 %-97 %] 95 %    Intake/Output Summary (Last 24 hours) at 2/29/2024 0815  Last data filed at 2/28/2024 2000  Gross per 24 hour   Intake 720 ml   Output --   Net 720 ml       GENERAL:  Awake, alert, no acute distress, lying in bed  HEAD: Nomocephalic atraumatic  SCLERA: anicteric  EXTREMITIES: warm and well perfused  ABDOMEN:  Soft, non- tender, non-distended, no rebound or guarding, no peritoneal signs      LABS:  Lab Results   Component Value Date    WBC 5.8 02/28/2024    WBC 7.9 01/10/2018     Lab Results   Component Value Date    HGB 11.1 02/28/2024    HGB 10.4 03/26/2018     Lab Results   Component Value Date    HCT 34.0 02/28/2024    HCT 34.2 01/10/2018     Lab Results   Component Value Date     02/28/2024     01/10/2018     Last Basic Metabolic Panel:  Lab Results   Component Value Date     02/28/2024      Lab Results   Component Value Date    POTASSIUM 4.2 02/28/2024     Lab Results   Component Value Date    CHLORIDE 106 02/28/2024     Lab Results   Component Value Date    TJ 8.5 02/28/2024     Lab Results   Component Value Date    CO2 24 02/28/2024     Lab Results   Component Value Date    BUN 8.6 02/28/2024     Lab Results   Component Value Date    CR 0.68 02/28/2024     Lab Results   Component Value Date     02/28/2024       ASSESSMENT/PLAN: Anne Costello is a 39-year-old woman admitted on 2/27 for perforated diverticulitis.     - CT scan this morning to evaluate for abscess  - Continue antibiotics, may transition to oral.  - Pain management, minimize narcotics  - May advance diet to low fiber if no drainable abscess on imaging  - Okay to discharge home on oral antibiotics if no abscess and  clinically improved. She will need outpatient CRS follow up and colonoscopy  in 6-8 weeks.      For questions/paging, please contact the CRS office at [phone].    Sandeep Dumont PA-C  [facility]  Phone: [phone]

## 2024-02-29 NOTE — PLAN OF CARE
VSS. RA. A/Ox3. Afebrile. Denies pain, nausea, bloating. Passing gas. NPO at 0000.    Plan: IVF, IV abx, repeat CT

## 2024-03-01 ENCOUNTER — TRANSCRIBE ORDERS (OUTPATIENT)
Dept: GASTROENTEROLOGY | Facility: CLINIC | Age: 39
End: 2024-03-01
Payer: COMMERCIAL

## 2024-03-01 ENCOUNTER — HOSPITAL ENCOUNTER (OUTPATIENT)
Facility: CLINIC | Age: 39
End: 2024-03-01
Attending: COLON & RECTAL SURGERY | Admitting: COLON & RECTAL SURGERY
Payer: COMMERCIAL

## 2024-03-01 ENCOUNTER — TELEPHONE (OUTPATIENT)
Dept: GASTROENTEROLOGY | Facility: CLINIC | Age: 39
End: 2024-03-01
Payer: COMMERCIAL

## 2024-03-01 DIAGNOSIS — K57.32 DIVERTICULITIS OF COLON: Primary | ICD-10-CM

## 2024-03-01 LAB
ENTEROCOCCUS FAECALIS: NOT DETECTED
ENTEROCOCCUS FAECIUM: NOT DETECTED
LISTERIA SPECIES (DETECTED/NOT DETECTED): NOT DETECTED
STAPHYLOCOCCUS AUREUS: NOT DETECTED
STAPHYLOCOCCUS EPIDERMIDIS: NOT DETECTED
STAPHYLOCOCCUS LUGDUNENSIS: NOT DETECTED
STAPHYLOCOCCUS SPECIES: NOT DETECTED
STREPTOCOCCUS AGALACTIAE: NOT DETECTED
STREPTOCOCCUS ANGINOSUS GROUP: NOT DETECTED
STREPTOCOCCUS PNEUMONIAE: NOT DETECTED
STREPTOCOCCUS PYOGENES: NOT DETECTED
STREPTOCOCCUS SPECIES: NOT DETECTED

## 2024-03-01 NOTE — TELEPHONE ENCOUNTER
REMINDER: We do not accept [employer] insurance.      Procedure Scheduled: Lower Endoscopy [Colonoscopy]  Procedure Date: 4/9/2024 @ 8:05AM  Site:[facility]; [address], [place] [address]  Endoscopist: CHETNA  Ordering Provider: CHETNA  Scheduled by (per the direction of): Fax from [facility].

## 2024-03-03 LAB — BACTERIA BLD CULT: NO GROWTH

## 2024-03-05 LAB
BACTERIA BLD CULT: ABNORMAL
BACTERIA BLD CULT: ABNORMAL

## 2024-03-14 ENCOUNTER — APPOINTMENT (OUTPATIENT)
Dept: CT IMAGING | Facility: CLINIC | Age: 39
End: 2024-03-14
Attending: EMERGENCY MEDICINE
Payer: COMMERCIAL

## 2024-03-14 ENCOUNTER — HOSPITAL ENCOUNTER (EMERGENCY)
Facility: CLINIC | Age: 39
Discharge: HOME | End: 2024-03-14
Attending: EMERGENCY MEDICINE | Admitting: EMERGENCY MEDICINE
Payer: COMMERCIAL

## 2024-03-14 VITALS
HEIGHT: 66 IN | HEART RATE: 53 BPM | OXYGEN SATURATION: 100 % | WEIGHT: 179.23 LBS | RESPIRATION RATE: 20 BRPM | SYSTOLIC BLOOD PRESSURE: 125 MMHG | DIASTOLIC BLOOD PRESSURE: 74 MMHG | BODY MASS INDEX: 28.81 KG/M2 | TEMPERATURE: 97.9 F

## 2024-03-14 DIAGNOSIS — K62.5 RECTAL BLEEDING: ICD-10-CM

## 2024-03-14 DIAGNOSIS — K57.92 DIVERTICULITIS: ICD-10-CM

## 2024-03-14 LAB
ALBUMIN SERPL BCG-MCNC: 4.2 G/DL (ref 3.5–5.2)
ALP SERPL-CCNC: 55 U/L (ref 40–150)
ALT SERPL W P-5'-P-CCNC: 42 U/L (ref 0–50)
ANION GAP SERPL CALCULATED.3IONS-SCNC: 10 MMOL/L (ref 7–15)
AST SERPL W P-5'-P-CCNC: 29 U/L (ref 0–45)
BASOPHILS # BLD AUTO: 0 10E3/UL (ref 0–0.2)
BASOPHILS NFR BLD AUTO: 1 %
BILIRUB SERPL-MCNC: 0.5 MG/DL
BUN SERPL-MCNC: 10.3 MG/DL (ref 6–20)
CALCIUM SERPL-MCNC: 9.2 MG/DL (ref 8.6–10)
CHLORIDE SERPL-SCNC: 103 MMOL/L (ref 98–107)
CREAT SERPL-MCNC: 0.71 MG/DL (ref 0.51–0.95)
DEPRECATED HCO3 PLAS-SCNC: 25 MMOL/L (ref 22–29)
EGFRCR SERPLBLD CKD-EPI 2021: >90 ML/MIN/1.73M2
EOSINOPHIL # BLD AUTO: 0.1 10E3/UL (ref 0–0.7)
EOSINOPHIL NFR BLD AUTO: 2 %
ERYTHROCYTE [DISTWIDTH] IN BLOOD BY AUTOMATED COUNT: 12.9 % (ref 10–15)
GLUCOSE SERPL-MCNC: 93 MG/DL (ref 70–99)
HCT VFR BLD AUTO: 43.1 % (ref 35–47)
HGB BLD-MCNC: 13.9 G/DL (ref 11.7–15.7)
HOLD SPECIMEN: NORMAL
IMM GRANULOCYTES # BLD: 0 10E3/UL
IMM GRANULOCYTES NFR BLD: 0 %
LYMPHOCYTES # BLD AUTO: 1.9 10E3/UL (ref 0.8–5.3)
LYMPHOCYTES NFR BLD AUTO: 32 %
MCH RBC QN AUTO: 29.8 PG (ref 26.5–33)
MCHC RBC AUTO-ENTMCNC: 32.3 G/DL (ref 31.5–36.5)
MCV RBC AUTO: 93 FL (ref 78–100)
MONOCYTES # BLD AUTO: 0.3 10E3/UL (ref 0–1.3)
MONOCYTES NFR BLD AUTO: 4 %
NEUTROPHILS # BLD AUTO: 3.8 10E3/UL (ref 1.6–8.3)
NEUTROPHILS NFR BLD AUTO: 61 %
NRBC # BLD AUTO: 0 10E3/UL
NRBC BLD AUTO-RTO: 0 /100
PLATELET # BLD AUTO: 233 10E3/UL (ref 150–450)
POTASSIUM SERPL-SCNC: 4.3 MMOL/L (ref 3.4–5.3)
PROT SERPL-MCNC: 7.3 G/DL (ref 6.4–8.3)
RBC # BLD AUTO: 4.66 10E6/UL (ref 3.8–5.2)
SODIUM SERPL-SCNC: 138 MMOL/L (ref 135–145)
WBC # BLD AUTO: 6.1 10E3/UL (ref 4–11)

## 2024-03-14 PROCEDURE — 85025 COMPLETE CBC W/AUTO DIFF WBC: CPT | Performed by: EMERGENCY MEDICINE

## 2024-03-14 PROCEDURE — 74177 CT ABD & PELVIS W/CONTRAST: CPT

## 2024-03-14 PROCEDURE — 250N000009 HC RX 250: Performed by: EMERGENCY MEDICINE

## 2024-03-14 PROCEDURE — 99285 EMERGENCY DEPT VISIT HI MDM: CPT | Mod: 25

## 2024-03-14 PROCEDURE — 80053 COMPREHEN METABOLIC PANEL: CPT | Performed by: EMERGENCY MEDICINE

## 2024-03-14 PROCEDURE — 250N000011 HC RX IP 250 OP 636: Performed by: EMERGENCY MEDICINE

## 2024-03-14 PROCEDURE — 36415 COLL VENOUS BLD VENIPUNCTURE: CPT | Performed by: EMERGENCY MEDICINE

## 2024-03-14 RX ORDER — IOPAMIDOL 755 MG/ML
500 INJECTION, SOLUTION INTRAVASCULAR ONCE
Status: COMPLETED | OUTPATIENT
Start: 2024-03-14 | End: 2024-03-14

## 2024-03-14 RX ADMIN — IOPAMIDOL 90 ML: 755 INJECTION, SOLUTION INTRAVENOUS at 09:15

## 2024-03-14 RX ADMIN — SODIUM CHLORIDE 62 ML: 9 INJECTION, SOLUTION INTRAVENOUS at 09:15

## 2024-03-14 ASSESSMENT — COLUMBIA-SUICIDE SEVERITY RATING SCALE - C-SSRS
6. HAVE YOU EVER DONE ANYTHING, STARTED TO DO ANYTHING, OR PREPARED TO DO ANYTHING TO END YOUR LIFE?: NO
1. IN THE PAST MONTH, HAVE YOU WISHED YOU WERE DEAD OR WISHED YOU COULD GO TO SLEEP AND NOT WAKE UP?: NO
2. HAVE YOU ACTUALLY HAD ANY THOUGHTS OF KILLING YOURSELF IN THE PAST MONTH?: NO

## 2024-03-14 ASSESSMENT — ACTIVITIES OF DAILY LIVING (ADL)
ADLS_ACUITY_SCORE: 37
ADLS_ACUITY_SCORE: 35

## 2024-03-14 NOTE — DISCHARGE INSTRUCTIONS
Please keep your scheduled appointments with primary care, colorectal, and colonoscopy.  Please make sure that your stools are soft, no straining.  Low fiber diet.    Discharge Instructions  Diverticulitis    Your provider has diagnosed you with diverticulitis.  Diverticulitis is inflammation of a diverticulum, which is a tiny sack-like structure that protrudes off the wall of the colon (large intestine).  These sacks are created over years of increased pressure in the colon (this is diverticulosis), usually as a result of a diet without enough fruits, vegetables, and whole grains. Diverticulosis doesn't cause symptoms.    When these sacks get inflamed, it is called divericulitis.  Diverticulitis causes abdominal (belly) pain, fever, nausea (sick to stomach), and vomiting (throwing up). Diverticulitis is usually treated at home.  However, sometimes diverticulitis needs treatment in the hospital and may even need surgery.      Generally, every Emergency Department visit should have a follow-up clinic visit with either a primary or a specialty clinic/provider. Please follow-up as instructed by your emergency provider today.    Return to the Emergency Department if:   You get an oral temperature above 102oF or as directed by your provider.  You have blood in your stools (bright red or black, tarry stools), or have blood in your vomit.  You keep vomiting or cannot drink liquids or cannot keep your medicine down.  You cannot have a bowel movement or you cannot pass gas.  Your stomach gets bloated or bigger.  You faint or become very weak.  Your pain is too bad to tolerate.  You have new symptoms or anything that worries you.    What can I do to help myself? How is diverticulitis treated?  Diverticulitis can be treated without antibiotics in many cases. In the past, diverticulitis was routinely treated with antibiotics.  However, research over the past decade has found that antibiotic use in most cases of diverticulitis  does not improve healing because the process may be more inflammatory, rather than infectious.  Antibiotics also wipe out good, healthy gut bacteria, which we are increasingly aware are important to overall health.  Some patients with certain medical conditions, lab results, or duration of symptoms may still need antibiotics. If you were directed to take antibiotics, take them right away and be sure to finish the whole antibiotic prescription.  Regardless of whether or not you are treated with antibiotics, for the first day or two at home drink only clear liquids.  This lets your intestines rest.  If your pain has improved after one or two days, you may start eating mild foods. Soda crackers, toast, plain noodles, gelatin, applesauce and bananas are good first choices.  Avoid foods that have acid, are spicy, fatty or fibrous (such as meats, coarse grains, vegetables). You may start eating these foods again in about 3-4 days when you are better.  Once you are back to normal, eat a high fiber diet of fruits, vegetables and whole grains. Some people think you should avoid eating nuts, seeds, and corn, but there is no definite proof this makes any difference in whether you will get diverticulitis again.   Pain and fever can be treated with acetaminophen (Tylenol ), ibuprofen (Advil ) or you might have been prescribed a pain medication.  Hot packs or a heating pad may also feel good.    When Should I Follow Up After I Am Feeling Better?  Follow-up as directed by your provider during your visit.  If this was your first episode of diverticulitis, you should have a colonoscopy within 6-8 weeks because in a small number of patients, malignancy (cancer) can look like diverticulitis.   You may not need a colonoscopy if you recently had one. Talk to your primary care doctor to determine if you need a colonoscopy.    Probiotics: If you have been given an antibiotic, you may want to also take a probiotic pill or eat yogurt with  "live cultures. Probiotics have \"good bacteria\" to help your intestines stay healthy. Studies have shown that probiotics help prevent diarrhea and other intestine problems (including C. diff infection) when you take antibiotics. You can buy these without a prescription in the pharmacy section of the store.    If you were given a prescription for medicine here today, be sure to read all of the information (including the package insert) that comes with your prescription.  This will include important information about the medicine, its side effects, and any warnings that you need to know about.  The pharmacist who fills the prescription can provide more information and answer questions you may have about the medicine.  If you have questions or concerns that the pharmacist cannot address, please call or return to the Emergency Department.     Remember that you can always come back to the Emergency Department if you are not able to see your regular provider in the amount of time listed above, if you get any new symptoms, or if there is anything that worries you.    "

## 2024-03-14 NOTE — CONSULTS
"[facility]  Colon and Rectal Surgery Consult Note  Name: Anen Costello    MRN: [id number]  YOB: 1985    Age: 39 year old  Date of admission: 3/14/2024  Primary care provider: [facility], [facility] [place]     Requesting Physician:  Dr. Killian  Reason for consult:  Rectal bleeding           History of Present Illness:   Anne Costello is a 39 year old female with history of nephrolithiasis, diverticulitis, and , seen at the request of Dr. Killian, presents with rectal bleeding. Patient was recently hospitalized from - with acute sigmoid diverticulitis with perforation and phlegmon. She improved with conservative management and was discharged with 2 weeks of Augmentin. She is scheduled for a colonoscopy with Dr. Lima on 24. Patient reports she had an episode of rectal bleeding during a bowel movement this morning and presented to the ER for further evaluation. In the ER, patient is afebrile. All other vital signs are within normal limits. Laboratory workup was unremarkable. WBC normal, Hgb 13.9, improved from 11.1 on . CT scan revealed: \"Again seen is wall thickening involving a segmental portion of the sigmoid colon containing numerous diverticula. There is adjacent inflammatory edema that may be minimally improved compared to  2024. Increased density versus enhancing material adjacent to  this portion of the sigmoid colon contains bubbles of gas suggesting  contained perforation. While this could represent ongoing  diverticulitis, other etiologies such as ongoing inflammation from  inflammatory bowel disease with penetrating disease is possible and  further workup should be considered. Please note that CT cannot  completely exclude a colonic neoplastic etiology.  2.  No new abscess identified. No additional new acute abnormality  identified.\"     Today, patient is resting comfortably in bed. She reports she had one episode of bright red blood in " "the toilet water during a bowel movement around 5:30 am. Denies any sharp rectal pain. She endorses some bright red blood on the tissue paper with wiping for a few days. She normally has 4-5 soft, formed bowel movements per day. She has been having normal bowel movements but was feeling somewhat constipated on Tuesday and took a stool softener that evening. She does not take stool softeners regularly. She has felt like she has to push more often recently to have a bowel movement. Denies any abdominal pain, fevers, chills, or nausea. No further bowel movements or bleeding episodes. She does endorse a headache. Patient will be completing her course of antibiotics today. She has been eating a soft diet.    Colonoscopy History:  None previous, scheduled for colonoscopy with Dr. Lima on 24    Surgical History:             Past Medical History:     Past Medical History:   Diagnosis Date    NO ACTIVE PROBLEMS              Past Surgical History:     Past Surgical History:   Procedure Laterality Date     SECTION N/A 3/24/2018    Procedure:  SECTION;   Section;  Surgeon: Roney Paez MD;  Location: [facility] OR    TONSILLECTOMY                 Social History:     Social History     Tobacco Use    Smoking status: Former     Types: Cigarettes     Quit date: 10/1/2015     Years since quittin.4    Smokeless tobacco: Never   Substance Use Topics    Alcohol use: No             Family History:   No family history on file.          Allergies:   No Known Allergies          Medications:             Review of Systems:   A comprehensive greater than 10 system review of systems was carried out.  Pertinent positives and negatives are noted above.  Otherwise negative for contributory info.            Physical Exam:     Blood pressure 110/67, pulse 64, temperature 97.9  F (36.6  C), temperature source Temporal, resp. rate 20, height 1.676 m (5' 6\"), weight 81.3 kg (179 lb 3.7 oz), last menstrual " period 02/25/2024, SpO2 100%, not currently breastfeeding.  No intake or output data in the 24 hours ending 03/14/24 1505  EXAM:  GEN: Awake alert and oriented, appears stated age  PULM: Non-labored breathing with normal respiratory effort  CVS: reg rate and rhythm, no peripheral edema  ABD: Soft, non- tender, non- distended. No rebound or guarding   RECTAL: Rectal exam with no swelling, redness, or fluctuance. No prolapsing tissue on Valsalva. PAVITHRA with normal resting tone, no masses palpated. Fullness consistent with internal hemorrhoids. No blood on gloved finger. Anoscopy reveals mildly enlarged and irritated hemorrhoids and mild blood on the scope afterward.  NEURO: CN II-XII grossly intact  MSK: extremeties with no clubbing, cyanosis or edema; able to ambulate  PSYCH: responsive, alert, cooperative; oriented x3; appropriate mood and affect  EXT/SKIN: inspection reveals no rashes, lesions or ulcers, normal coloring         Data Reviewed:     Results for orders placed or performed during the hospital encounter of 03/14/24   CT Abdomen Pelvis w Contrast    Narrative    CT ABDOMEN PELVIS WITH CONTRAST 3/14/2024 9:28 AM    CLINICAL HISTORY: Bright red rectal bleeding, recent history of  perforated diverticulitis.    TECHNIQUE: CT scan of the abdomen and pelvis was performed following  injection of IV contrast. Multiplanar reformats were obtained. Dose  reduction techniques were used.  CONTRAST: 90mL Isovue-370    COMPARISON: CT 2/29/2024.    FINDINGS:   LOWER CHEST: Normal.    HEPATOBILIARY: Normal.    PANCREAS: Normal.    SPLEEN: Normal.    ADRENAL GLANDS: Normal.    KIDNEYS/BLADDER: No hydronephrosis. Stable tiny nonobstructing stone  at the lower left kidney. Bladder is unremarkable.    BOWEL: Again seen is wall thickening at the sigmoid colon suggesting  inflammatory change in the setting of diverticulitis. Persistent  extraluminal extension of this process with some increased density and  bubbles of gas noted  "again series 3 image 152 that appears unchanged.  The edema in this region may be just minimally improved. There is no  loculated abscess or other new acute bowel abnormality.    PELVIC ORGANS: Small functional cyst at the right ovary.    ADDITIONAL FINDINGS: No adenopathy. No acute abdominal aortic  abnormality.    MUSCULOSKELETAL: No acute abnormality.      Impression    IMPRESSION:   1.  Again seen is wall thickening involving a segmental portion of the  sigmoid colon containing numerous diverticula. There is adjacent  inflammatory edema that may be minimally improved compared to  2024. Increased density versus enhancing material adjacent to  this portion of the sigmoid colon contains bubbles of gas suggesting  contained perforation. While this could represent ongoing  diverticulitis, other etiologies such as ongoing inflammation from  inflammatory bowel disease with penetrating disease is possible and  further workup should be considered. Please note that CT cannot  completely exclude a colonic neoplastic etiology.  2.  No new abscess identified. No additional new acute abnormality  identified.     KESHAV DRAKE MD         SYSTEM ID:  [id number]       Recent Labs   Lab 24  0844   WBC 6.1   HGB 13.9   HCT 43.1   MCV 93        Recent Labs   Lab 24  0844      POTASSIUM 4.3   CHLORIDE 103   CO2 25   ANIONGAP 10   GLC 93   BUN 10.3   CR 0.71   GFRESTIMATED >90   TJ 9.2   PROTTOTAL 7.3   ALBUMIN 4.2   BILITOTAL 0.5   ALKPHOS 55   AST 29   ALT 42     No results for input(s): \"LACT\" in the last 168 hours.  No results for input(s): \"COLOR\", \"APPEARANCE\", \"URINEGLC\", \"URINEBILI\", \"URINEKETONE\", \"SG\", \"UBLD\", \"URINEPH\", \"PROTEIN\", \"UROBILINOGEN\", \"NITRITE\", \"LEUKEST\", \"RBCU\", \"WBCU\" in the last 168 hours.      Assessment and Plan:   Anne Costello is a 39 year old female with history of nephrolithiasis, diverticulitis, and , seen at the request of Dr. Killian, presents with rectal " bleeding. Patient was recently hospitalized from - with sigmoid diverticulitis with perforation and phlegmon. She improved with conservative management and was discharged with 2 weeks of Augmentin. She has been doing well since discharge and is tolerating a low fiber diet. CT scan reveals ongoing segmental wall thickening of the sigmoid colon, minimally improved inflammatory edema, and a gas collection similar to previous scan on 24. No new abscess identified. She is hemodynamically stable and abdominal exam is benign. Rectal exam with some irritated internal hemorrhoids. It is uncertain whether this episode of rectal bleeding was a diverticular bleed or related to her irritated internal hemorrhoids. There is no indication for emergent surgery. Continue with conservative management and good bowel habits. Recommend she take a stool softener such as MiraLAX to help keep her stools soft and easy to pass. She should minimize toilet time and prevent pushing or straining. Continue to follow a low fiber diet and complete her current course of antibiotics. No need for further antibiotics at this time. Patient should return with any increased bleeding, abdominal pain, dizziness, fevers, chills, or other concerning symptoms. She should otherwise follow up for her colonoscopy on 24 as scheduled for further evaluation.    Plan:  Okay to discharge from CRS perspective  Surgery: No emergent surgery indicated  Diet: Low fiber  IV Fluids: Not indicated per CRS  Antibiotics:  Not indicated per CRS  Medications: Continue home meds  I&O s:  strict I&O s  Labs:   - Reviewed: by myself and Dr. Harp  - Ordered:    Imaging:   - Dr. Harp and myself have personally viewed: CT abd/pelvis  - Ordered:  None  This plan has been discussed with Dr. Harp    Patient specific identified risk factors considered as part of today s evaluation include: diverticulitis,     Additional history obtained from chart review and  patient.  Time spent on consultation: 60 minutes, greater than 50 percent of the total encounter time is spent in counseling and/or coordination of care          Sandeep Dumont PA-C  [facility]  Phone:  [phone]

## 2024-03-14 NOTE — ED PROVIDER NOTES
"  History     Chief Complaint:  Rectal Bleeding    The history is provided by the patient.      Anne Costello is a 39 year old female presenting with rectal bleeding. This morning, Anne went to the bathroom and had a bowel movement. When she got up, she noticed a lot of bright red blood in the toilet water. She reports it looked like she got her period. Patient endorses headache and feeling lightheaded yesterday. She took a stool softener on Tuesday (3/12/24) after starting to feel constipated. Yesterday, patient reports normal bowel movements all day. Patient denies abdominal pain, fever, nausea, or vomiting. Of note, Anne is on her last dose of Augmentin today after being diagnosed with diverticulosis on 3/5/24.     Independent Historian:   None - Patient Only    Review of External Notes:   Reviewed patient's office visit with family medicine as a follow-up to her hospitalization.  Patient was seen per perforated diverticulum, patient was still having some abdominal pain and pressure with bowel movements.  But overall she was feeling well.    Medications:    Senna-docusate   Tamsulosin   Ondansetron     Past Medical History:    Diverticulitis   Hernia   Nephrolithiasis     Past Surgical History:    C section   Tonsillectomy   Adenoidectomy      Physical Exam   Patient Vitals for the past 24 hrs:   BP Temp Temp src Pulse Resp SpO2 Height Weight   03/14/24 1120 110/67 -- -- -- -- 90 % -- --   03/14/24 1105 107/62 -- -- -- -- 99 % -- --   03/14/24 1050 105/61 -- -- -- -- 99 % -- --   03/14/24 1026 117/69 -- -- -- -- 98 % -- --   03/14/24 1011 107/61 -- -- -- -- 97 % -- --   03/14/24 0956 110/46 -- -- -- -- 100 % -- --   03/14/24 0846 (!) 135/91 97.9  F (36.6  C) Temporal 64 20 100 % 1.676 m (5' 6\") 81.3 kg (179 lb 3.7 oz)      Physical Exam  General: Well-nourished, resting comfortably when I enter the room  Eyes: Pupils equal, conjunctivae pink no scleral icterus or conjunctival injection  ENT:  Moist mucus " membranes  Respiratory:  Lungs clear to auscultation bilaterally, no crackles/rubs/wheezes.  Good air movement  CV: Normal rate and rhythm, no murmurs  GI:  Abdomen soft and non-distended.  No guarding or rebound. No tenderness to palpation.  Skin: Warm, dry.  No rashes or petechiae  Musculoskeletal: No peripheral edema or calf tenderness  Neuro: Alert and oriented to person/place/time  Psychiatric: Normal affect    Emergency Department Course       Imaging:  CT Abdomen Pelvis w Contrast   Final Result   IMPRESSION:    1.  Again seen is wall thickening involving a segmental portion of the   sigmoid colon containing numerous diverticula. There is adjacent   inflammatory edema that may be minimally improved compared to   2/29/2024. Increased density versus enhancing material adjacent to   this portion of the sigmoid colon contains bubbles of gas suggesting   contained perforation. While this could represent ongoing   diverticulitis, other etiologies such as ongoing inflammation from   inflammatory bowel disease with penetrating disease is possible and   further workup should be considered. Please note that CT cannot   completely exclude a colonic neoplastic etiology.   2.  No new abscess identified. No additional new acute abnormality   identified.       KESHAV DRAKE MD            SYSTEM ID:  [id number]         Laboratory:  Labs Ordered and Resulted from Time of ED Arrival to Time of ED Departure   COMPREHENSIVE METABOLIC PANEL - Normal       Result Value    Sodium 138      Potassium 4.3      Carbon Dioxide (CO2) 25      Anion Gap 10      Urea Nitrogen 10.3      Creatinine 0.71      GFR Estimate >90      Calcium 9.2      Chloride 103      Glucose 93      Alkaline Phosphatase 55      AST 29      ALT 42      Protein Total 7.3      Albumin 4.2      Bilirubin Total 0.5     CBC WITH PLATELETS AND DIFFERENTIAL    WBC Count 6.1      RBC Count 4.66      Hemoglobin 13.9      Hematocrit 43.1      MCV 93      MCH 29.8      MCHC  32.3      RDW 12.9      Platelet Count 233      % Neutrophils 61      % Lymphocytes 32      % Monocytes 4      % Eosinophils 2      % Basophils 1      % Immature Granulocytes 0      NRBCs per 100 WBC 0      Absolute Neutrophils 3.8      Absolute Lymphocytes 1.9      Absolute Monocytes 0.3      Absolute Eosinophils 0.1      Absolute Basophils 0.0      Absolute Immature Granulocytes 0.0      Absolute NRBCs 0.0        Procedures       Emergency Department Course & Assessments:    Interventions:  Medications   CT SCAN FLUSH (62 mLs Intravenous $Given 3/14/24 0915)   iopamidol (ISOVUE-370) solution 500 mL (90 mLs Intravenous $Given 3/14/24 0915)      Independent Interpretation (X-rays, CTs, rhythm strip):  None    Assessments/Consultations/Discussion of Management or Tests:  ED Course as of 24 1502   Thu Mar 14, 2024   0858 I obtained the history and examined the patient as noted above.      1025 I spoke with RIZWANA Hopkins from Colorectal regarding the patient's presentation and plan of care.      1059 I rechecked and updated the patient.      1150 I spoke with RIZWANA Hopkins from Colorectal regarding the patient's presentation and plan of care.      1412 I rechecked and updated the patient.        Social Determinants of Health affecting care:   None    Disposition:  The patient was discharged to home.     Impression & Plan    CMS Diagnoses: None    MIPS (If applicable):  NA    Medical Decision Makin-year-old female presenting to the emergency department with a complaint of an episode of bright red rectal bleeding this morning.  She reports that she recently was diagnosed with perforated diverticulitis, and had a 2-day stay in the hospital.  Since then she has been taking Augmentin and feeling much better.  She has been having regular bowel movements.  Today she had a morning bowel movement and then noticed bright red blood in the toilet.  She is not having any abdominal pain, nausea, vomiting, fevers.  On exam  patient is fairly well-appearing.  She does not look pale.  Abdomen is soft and nontender.  Workup reveals hemoglobin is within acceptable limits.  CT does not show any new abscess or perforation.  There is still diverticulitis with surrounding inflammation.  Spoke with RIZWANA Hopkins with colorectal.  She did consult with a colorectal surgeon.  She also came by the emergency department and saw the patient.  She advises that the patient can be discharged home.  She does not need any further antibiotics.  Have the patient keep all of her follow-up appointments including colonoscopy.  Make sure that she has soft stools and is not straining.  As well as a low fiber diet.  She agrees that the patient's abdomen is nonsurgical, and she does not need any interventions or admission today.  Patient is informed of all of this information.  She feels comfortable with this plan.  Patient is discharged home.    Diagnosis:    ICD-10-CM    1. Rectal bleeding  K62.5       2. Diverticulitis  K57.92          Discharge Medications:  New Prescriptions    No medications on file      Scribe Disclosure:  Dina LIM, am serving as a scribe at 8:56 AM on 3/14/2024 to document services personally performed by Dina Killian MD based on my observations and the provider's statements to me.  3/14/2024   Dina Killian MD Richardson, Elizabeth, MD  03/14/24 [id number]

## 2024-03-14 NOTE — ED TRIAGE NOTES
Hospitalized 2 weeks ago with diverticulitis. Pt reports BM overnight with bright red blood. 1/10 pain; dizziness. Denies CP SOB or fevers. ABC in tact. A/OX4

## 2024-04-02 ENCOUNTER — TELEPHONE (OUTPATIENT)
Dept: GASTROENTEROLOGY | Facility: CLINIC | Age: 39
End: 2024-04-02
Payer: COMMERCIAL

## 2024-04-02 NOTE — TELEPHONE ENCOUNTER
Caller: Kenroy--CRSAL    Reason for Reschedule/Cancellation   (please be detailed, any staff messages or encounters to note?): patient was rescheduled at another location      Prior to reschedule please review:  Ordering Provider: Janie  Sedation Determined: CS  Does patient have any ASC Exclusions, please identify?: no      Notes on Cancelled Procedure:  Procedure: Lower Endoscopy [Colonoscopy]   Date: 4/9  Location: [facility]; [address] [place] [address]  Surgeon: Janie      Rescheduled: No, CRSAL       Did you cancel or rescheduled an EUS procedure? No.

## 2024-04-03 ENCOUNTER — HOSPITAL ENCOUNTER (OUTPATIENT)
Dept: CT IMAGING | Facility: CLINIC | Age: 39
Discharge: HOME | End: 2024-04-03
Attending: COLON & RECTAL SURGERY | Admitting: COLON & RECTAL SURGERY
Payer: COMMERCIAL

## 2024-04-03 DIAGNOSIS — K57.32 DIVERTICULITIS OF COLON: ICD-10-CM

## 2024-04-03 PROCEDURE — 74177 CT ABD & PELVIS W/CONTRAST: CPT

## 2024-04-03 PROCEDURE — 250N000009 HC RX 250: Performed by: COLON & RECTAL SURGERY

## 2024-04-03 PROCEDURE — 250N000011 HC RX IP 250 OP 636: Performed by: COLON & RECTAL SURGERY

## 2024-04-03 RX ORDER — IOPAMIDOL 755 MG/ML
88 INJECTION, SOLUTION INTRAVASCULAR ONCE
Status: COMPLETED | OUTPATIENT
Start: 2024-04-03 | End: 2024-04-03

## 2024-04-03 RX ADMIN — IOPAMIDOL 88 ML: 755 INJECTION, SOLUTION INTRAVENOUS at 11:55

## 2024-04-03 RX ADMIN — SODIUM CHLORIDE 65 ML: 9 INJECTION, SOLUTION INTRAVENOUS at 11:54

## 2024-04-11 RX ORDER — ONDANSETRON 4 MG/1
1 TABLET, ORALLY DISINTEGRATING ORAL EVERY 8 HOURS PRN
COMMUNITY
Start: 2024-03-01

## 2024-04-18 ENCOUNTER — ANESTHESIA EVENT (OUTPATIENT)
Dept: SURGERY | Facility: CLINIC | Age: 39
End: 2024-04-18
Payer: COMMERCIAL

## 2024-04-18 ENCOUNTER — HOSPITAL ENCOUNTER (OUTPATIENT)
Facility: CLINIC | Age: 39
Discharge: HOME | End: 2024-04-18
Attending: COLON & RECTAL SURGERY | Admitting: COLON & RECTAL SURGERY
Payer: COMMERCIAL

## 2024-04-18 VITALS
HEART RATE: 53 BPM | RESPIRATION RATE: 10 BRPM | DIASTOLIC BLOOD PRESSURE: 69 MMHG | OXYGEN SATURATION: 100 % | BODY MASS INDEX: 27.28 KG/M2 | HEIGHT: 68 IN | WEIGHT: 180 LBS | SYSTOLIC BLOOD PRESSURE: 114 MMHG

## 2024-04-18 LAB — COLONOSCOPY: NORMAL

## 2024-04-18 PROCEDURE — 88305 TISSUE EXAM BY PATHOLOGIST: CPT | Mod: TC | Performed by: COLON & RECTAL SURGERY

## 2024-04-18 PROCEDURE — 45385 COLONOSCOPY W/LESION REMOVAL: CPT | Mod: PT

## 2024-04-18 PROCEDURE — 250N000011 HC RX IP 250 OP 636: Performed by: COLON & RECTAL SURGERY

## 2024-04-18 PROCEDURE — 45385 COLONOSCOPY W/LESION REMOVAL: CPT | Performed by: COLON & RECTAL SURGERY

## 2024-04-18 PROCEDURE — 88305 TISSUE EXAM BY PATHOLOGIST: CPT | Mod: 26 | Performed by: PATHOLOGY

## 2024-04-18 PROCEDURE — G0500 MOD SEDAT ENDO SERVICE >5YRS: HCPCS | Performed by: COLON & RECTAL SURGERY

## 2024-04-18 PROCEDURE — 0DBN8ZX EXCISION OF SIGMOID COLON, VIA NATURAL OR ARTIFICIAL OPENING ENDOSCOPIC, DIAGNOSTIC: ICD-10-PCS | Performed by: COLON & RECTAL SURGERY

## 2024-04-18 RX ORDER — NALOXONE HYDROCHLORIDE 0.4 MG/ML
0.4 INJECTION, SOLUTION INTRAMUSCULAR; INTRAVENOUS; SUBCUTANEOUS
Status: CANCELLED | OUTPATIENT
Start: 2024-04-18

## 2024-04-18 RX ORDER — PROCHLORPERAZINE MALEATE 10 MG
10 TABLET ORAL EVERY 6 HOURS PRN
Status: CANCELLED | OUTPATIENT
Start: 2024-04-18

## 2024-04-18 RX ORDER — FENTANYL CITRATE 50 UG/ML
INJECTION, SOLUTION INTRAMUSCULAR; INTRAVENOUS PRN
Status: DISCONTINUED | OUTPATIENT
Start: 2024-04-18 | End: 2024-04-18 | Stop reason: HOSPADM

## 2024-04-18 RX ORDER — ONDANSETRON 2 MG/ML
4 INJECTION INTRAMUSCULAR; INTRAVENOUS
Status: DISCONTINUED | OUTPATIENT
Start: 2024-04-18 | End: 2024-04-18 | Stop reason: HOSPADM

## 2024-04-18 RX ORDER — IBUPROFEN 200 MG
200-400 TABLET ORAL EVERY 6 HOURS PRN
COMMUNITY

## 2024-04-18 RX ORDER — NALOXONE HYDROCHLORIDE 0.4 MG/ML
0.2 INJECTION, SOLUTION INTRAMUSCULAR; INTRAVENOUS; SUBCUTANEOUS
Status: CANCELLED | OUTPATIENT
Start: 2024-04-18

## 2024-04-18 RX ORDER — ONDANSETRON 2 MG/ML
4 INJECTION INTRAMUSCULAR; INTRAVENOUS EVERY 6 HOURS PRN
Status: CANCELLED | OUTPATIENT
Start: 2024-04-18

## 2024-04-18 RX ORDER — FLUMAZENIL 0.1 MG/ML
0.2 INJECTION, SOLUTION INTRAVENOUS
Status: CANCELLED | OUTPATIENT
Start: 2024-04-18 | End: 2024-04-18

## 2024-04-18 RX ORDER — ONDANSETRON 4 MG/1
4 TABLET, ORALLY DISINTEGRATING ORAL EVERY 6 HOURS PRN
Status: CANCELLED | OUTPATIENT
Start: 2024-04-18

## 2024-04-18 RX ORDER — LIDOCAINE 40 MG/G
CREAM TOPICAL
Status: DISCONTINUED | OUTPATIENT
Start: 2024-04-18 | End: 2024-04-18 | Stop reason: HOSPADM

## 2024-04-18 ASSESSMENT — COPD QUESTIONNAIRES: COPD: 0

## 2024-04-18 ASSESSMENT — ACTIVITIES OF DAILY LIVING (ADL)
ADLS_ACUITY_SCORE: 37
ADLS_ACUITY_SCORE: 37

## 2024-04-18 ASSESSMENT — LIFESTYLE VARIABLES: TOBACCO_USE: 0

## 2024-04-18 NOTE — PROGRESS NOTES
PTA medications updated by Medication Scribe prior to surgery via phone call with patient (last doses completed by Nurse)     Medication history sources: Patient  In the past week, patient estimated taking medication this percent of the time: Greater than 90%      Significant changes made to the medication list:  Patient reports no longer taking the following meds (med scribe removed from PTA med list): Augmentin      Additional medication history information:   None    Medication reconciliation completed by provider prior to medication history? No    Time spent in this activity: 30 mins    The information provided in this note is only as accurate as the sources available at the time of update(s)      Prior to Admission medications    Medication Sig Last Dose Taking? Auth Provider Long Term End Date   ibuprofen (ADVIL/MOTRIN) 200 MG tablet Take 200-400 mg by mouth every 6 hours as needed for pain More than a month Yes Reported, Patient     ondansetron (ZOFRAN ODT) 4 MG ODT tab Take 1 tablet by mouth every 8 hours as needed Unknown Yes Reported, Patient     SENNA-docusate sodium (SENNA S) 8.6-50 MG tablet Take 1 tablet by mouth 2 times daily as needed (constipation)  Yes Charly Hilliard MD     tamsulosin (FLOMAX) 0.4 MG capsule Take 0.4 mg by mouth daily  Yes Unknown, Entered By History     acetaminophen (TYLENOL) 325 MG tablet Take 2 tablets (650 mg) by mouth every 4 hours as needed for mild pain or other (and adjunct with moderate or severe pain or per patient request) 4/15/2024  Charly Hilliard MD         Medication history completed by: Rabia Dunlap

## 2024-04-18 NOTE — H&P
Pre-Endoscopy History and Physical     Anne Costello MRN# [id number]   YOB: 1985 Age: 39 year old     Date of Procedure: 2024  Primary care provider: [facility], [facility] [place]  Type of Endoscopy: Colonoscopy  Reason for Procedure: Screening, h/o diverticulitis  Type of Anesthesia Anticipated: Moderate Sedation    HPI:    Anne is a 39 year old female who will be undergoing the above procedure.      A history and physical has been performed. The patient's medications and allergies have been reviewed. The risks and benefits of the procedure and the sedation options and risks were discussed with the patient.  All questions were answered and informed consent was obtained.      She denies a personal or family history of anesthesia complications or bleeding disorders.     No Known Allergies     No current facility-administered medications for this encounter.       Patient Active Problem List   Diagnosis    Supervision of normal pregnancy    Pyelectasis of fetus on prenatal ultrasound    Prenatal care in third trimester    Family history of galactosemia    S/P     Perforated diverticulum        Past Medical History:   Diagnosis Date    Diverticulosis     Hiatal hernia     History of dysplastic nevus     Multiple pigmented nevi         Past Surgical History:   Procedure Laterality Date     SECTION N/A 2018    Procedure:  SECTION;   Section;  Surgeon: Roney Paez MD;  Location: [facility] OR    Nephrolithiasis  2023    Perforated diverticulum  2024    TONSILLECTOMY & ADENOIDECTOMY         Social History     Tobacco Use    Smoking status: Former     Current packs/day: 0.00     Average packs/day: 1 pack/day for 15.0 years (15.0 ttl pk-yrs)     Types: Cigarettes     Start date:      Quit date: 2016     Years since quittin.3    Smokeless tobacco: Never   Substance Use Topics    Alcohol use: Yes     Comment: rare       History reviewed. No  "pertinent family history.    REVIEW OF SYSTEMS:     5 point ROS negative except as noted above in HPI, including Gen., Resp., CV, GI &  system review.      PHYSICAL EXAM:   LMP 02/25/2024 (Exact Date)  Estimated body mass index is 28.93 kg/m  as calculated from the following:    Height as of 3/14/24: 1.676 m (5' 6\").    Weight as of 3/14/24: 81.3 kg (179 lb 3.7 oz).   GENERAL APPEARANCE: healthy and alert  MENTAL STATUS: alert  AIRWAY EXAM: Mallampatti Class I (visualization of the soft palate, fauces, uvula, anterior and posterior pillars)  RESP: lungs clear to auscultation - no rales, rhonchi or wheezes  CV: regular rates and rhythm      IMPRESSION   ASA Class 2 - Mild systemic disease        PLAN:     Plan for colonoscopy. We discussed the risks, benefits and alternatives and the patient wished to proceed.    The above has been forwarded to the consulting provider.      Carolyn Lima MD  [facility]  Phone: [phone]  Fax: [phone]  April 18, 2024    "

## 2024-04-18 NOTE — ANESTHESIA PREPROCEDURE EVALUATION
Anesthesia Pre-Procedure Evaluation    Patient: Anne Costello   MRN: [id number] : 1985        Procedure : Procedure(s):  robotic sigmoid colectomy          Past Medical History:   Diagnosis Date     Diverticulosis      Hiatal hernia      History of dysplastic nevus      Multiple pigmented nevi       Past Surgical History:   Procedure Laterality Date      SECTION N/A 2018    Procedure:  SECTION;   Section;  Surgeon: Roney Paez MD;  Location: [facility] OR     Nephrolithiasis  2023     Perforated diverticulum  2024     TONSILLECTOMY & ADENOIDECTOMY        No Known Allergies   Social History     Tobacco Use     Smoking status: Former     Current packs/day: 0.00     Average packs/day: 1 pack/day for 15.0 years (15.0 ttl pk-yrs)     Types: Cigarettes     Start date:      Quit date: 2016     Years since quittin.3     Smokeless tobacco: Never   Substance Use Topics     Alcohol use: Yes     Comment: rare      Wt Readings from Last 1 Encounters:   24 81.6 kg (180 lb)        Anesthesia Evaluation   Pt has had prior anesthetic. Type: [facility] and [facility].    No history of anesthetic complications       ROS/MED HX  ENT/Pulmonary:    (-) tobacco use, asthma, COPD and sleep apnea   Neurologic:  - neg neurologic ROS     Cardiovascular:    (-) hypertension and CAD   METS/Exercise Tolerance: >4 METS    Hematologic:  - neg hematologic  ROS     Musculoskeletal:       GI/Hepatic: Comment: Diverticulitis    (+)      hiatal hernia,           (-) GERD and liver disease   Renal/Genitourinary:    (-) renal disease   Endo:    (-) Type I DM and Type II DM   Psychiatric/Substance Use:       Infectious Disease:       Malignancy:       Other:            Physical Exam    Airway        Mallampati: II   TM distance: > 3 FB   Neck ROM: full   Mouth opening: > 3 cm    Respiratory Devices and Support         Dental       (+) Modest Abnormalities - crowns, retainers, 1 or 2 missing  "teeth      Cardiovascular          Rhythm and rate: regular and normal     Pulmonary   pulmonary exam normal            OUTSIDE LABS:  CBC:   Lab Results   Component Value Date    WBC 6.1 03/14/2024    WBC 5.8 02/28/2024    HGB 13.9 03/14/2024    HGB 11.1 (L) 02/28/2024    HCT 43.1 03/14/2024    HCT 34.0 (L) 02/28/2024     03/14/2024     02/28/2024     BMP:   Lab Results   Component Value Date     03/14/2024     02/28/2024    POTASSIUM 4.3 03/14/2024    POTASSIUM 4.2 02/28/2024    CHLORIDE 103 03/14/2024    CHLORIDE 106 02/28/2024    CO2 25 03/14/2024    CO2 24 02/28/2024    BUN 10.3 03/14/2024    BUN 8.6 02/28/2024    CR 0.71 03/14/2024    CR 0.68 02/28/2024    GLC 93 03/14/2024     (H) 02/28/2024     COAGS: No results found for: \"PTT\", \"INR\", \"FIBR\"  POC:   Lab Results   Component Value Date    HCG Negative 01/25/2023     HEPATIC:   Lab Results   Component Value Date    ALBUMIN 4.2 03/14/2024    PROTTOTAL 7.3 03/14/2024    ALT 42 03/14/2024    AST 29 03/14/2024    ALKPHOS 55 03/14/2024    BILITOTAL 0.5 03/14/2024     OTHER:   Lab Results   Component Value Date    LACT 1.2 02/27/2024    TJ 9.2 03/14/2024    LIPASE 23 02/27/2024       Anesthesia Plan    ASA Status:  2    NPO Status:  NPO Appropriate    Anesthesia Type: General.     - Airway: ETT   Induction: Intravenous.   Maintenance: Balanced.   Techniques and Equipment:     - Lines/Monitors: 2nd IV     - Drips/Meds: Phenylephrine     Consents    Anesthesia Plan(s) and associated risks, benefits, and realistic alternatives discussed. Questions answered and patient/representative(s) expressed understanding.     - Discussed:     - Discussed with:  Patient       Use of blood products discussed: Yes.     - Discussed with: Patient.     - Consented: consented to blood products     Postoperative Care    Pain management: Multi-modal analgesia.   PONV prophylaxis: Ondansetron (or other 5HT-3), Dexamethasone or Solumedrol, Background " "Propofol Infusion     Comments:               Devin Stallworth MD    I have reviewed the pertinent notes and labs in the chart from the past 30 days and (re)examined the patient.  Any updates or changes from those notes are reflected in this note.              # Overweight: Estimated body mass index is 27.37 kg/m  as calculated from the following:    Height as of 4/18/24: 1.727 m (5' 8\").    Weight as of 4/18/24: 81.6 kg (180 lb).      "

## 2024-04-19 ENCOUNTER — ANESTHESIA (OUTPATIENT)
Dept: SURGERY | Facility: CLINIC | Age: 39
End: 2024-04-19
Payer: COMMERCIAL

## 2024-04-19 ENCOUNTER — HOSPITAL ENCOUNTER (INPATIENT)
Facility: CLINIC | Age: 39
LOS: 2 days | Discharge: HOME | End: 2024-04-21
Attending: COLON & RECTAL SURGERY | Admitting: COLON & RECTAL SURGERY
Payer: COMMERCIAL

## 2024-04-19 DIAGNOSIS — K57.92 DIVERTICULITIS: Primary | ICD-10-CM

## 2024-04-19 LAB
ABO/RH(D): NORMAL
ANTIBODY SCREEN: NEGATIVE
CREAT SERPL-MCNC: 0.63 MG/DL (ref 0.51–0.95)
EGFRCR SERPLBLD CKD-EPI 2021: >90 ML/MIN/1.73M2
GLUCOSE SERPL-MCNC: 103 MG/DL (ref 70–99)
HCG INTACT+B SERPL-ACNC: <1 MIU/ML
PATH REPORT.COMMENTS IMP SPEC: NORMAL
PATH REPORT.COMMENTS IMP SPEC: NORMAL
PATH REPORT.FINAL DX SPEC: NORMAL
PATH REPORT.GROSS SPEC: NORMAL
PATH REPORT.MICROSCOPIC SPEC OTHER STN: NORMAL
PATH REPORT.RELEVANT HX SPEC: NORMAL
PHOTO IMAGE: NORMAL
SPECIMEN EXPIRATION DATE: NORMAL

## 2024-04-19 PROCEDURE — 120N000001 HC R&B MED SURG/OB

## 2024-04-19 PROCEDURE — 250N000011 HC RX IP 250 OP 636: Performed by: REGISTERED NURSE

## 2024-04-19 PROCEDURE — 272N000001 HC OR GENERAL SUPPLY STERILE: Performed by: COLON & RECTAL SURGERY

## 2024-04-19 PROCEDURE — 258N000001 HC RX 258: Performed by: COLON & RECTAL SURGERY

## 2024-04-19 PROCEDURE — 8E0W4CZ ROBOTIC ASSISTED PROCEDURE OF TRUNK REGION, PERCUTANEOUS ENDOSCOPIC APPROACH: ICD-10-PCS | Performed by: COLON & RECTAL SURGERY

## 2024-04-19 PROCEDURE — 44204 LAPARO PARTIAL COLECTOMY: CPT | Performed by: ANESTHESIOLOGY

## 2024-04-19 PROCEDURE — 4A1BXSH MONITORING OF GASTROINTESTINAL VASCULAR PERFUSION USING INDOCYANINE GREEN DYE, EXTERNAL APPROACH: ICD-10-PCS | Performed by: COLON & RECTAL SURGERY

## 2024-04-19 PROCEDURE — 0DTN4ZZ RESECTION OF SIGMOID COLON, PERCUTANEOUS ENDOSCOPIC APPROACH: ICD-10-PCS | Performed by: COLON & RECTAL SURGERY

## 2024-04-19 PROCEDURE — 36415 COLL VENOUS BLD VENIPUNCTURE: CPT | Performed by: COLON & RECTAL SURGERY

## 2024-04-19 PROCEDURE — 360N000080 HC SURGERY LEVEL 7, PER MIN: Performed by: COLON & RECTAL SURGERY

## 2024-04-19 PROCEDURE — 250N000013 HC RX MED GY IP 250 OP 250 PS 637: Performed by: COLON & RECTAL SURGERY

## 2024-04-19 PROCEDURE — 250N000011 HC RX IP 250 OP 636: Performed by: COLON & RECTAL SURGERY

## 2024-04-19 PROCEDURE — 250N000009 HC RX 250: Performed by: ANESTHESIOLOGY

## 2024-04-19 PROCEDURE — 258N000003 HC RX IP 258 OP 636: Performed by: REGISTERED NURSE

## 2024-04-19 PROCEDURE — 250N000025 HC SEVOFLURANE, PER MIN: Performed by: COLON & RECTAL SURGERY

## 2024-04-19 PROCEDURE — 93005 ELECTROCARDIOGRAM TRACING: CPT

## 2024-04-19 PROCEDURE — 93010 ELECTROCARDIOGRAM REPORT: CPT | Performed by: INTERNAL MEDICINE

## 2024-04-19 PROCEDURE — 250N000009 HC RX 250: Performed by: REGISTERED NURSE

## 2024-04-19 PROCEDURE — 999N000141 HC STATISTIC PRE-PROCEDURE NURSING ASSESSMENT: Performed by: COLON & RECTAL SURGERY

## 2024-04-19 PROCEDURE — 710N000009 HC RECOVERY PHASE 1, LEVEL 1, PER MIN: Performed by: COLON & RECTAL SURGERY

## 2024-04-19 PROCEDURE — 258N000003 HC RX IP 258 OP 636: Performed by: COLON & RECTAL SURGERY

## 2024-04-19 PROCEDURE — 370N000017 HC ANESTHESIA TECHNICAL FEE, PER MIN: Performed by: COLON & RECTAL SURGERY

## 2024-04-19 PROCEDURE — 44204 LAPARO PARTIAL COLECTOMY: CPT | Performed by: REGISTERED NURSE

## 2024-04-19 PROCEDURE — 82947 ASSAY GLUCOSE BLOOD QUANT: CPT | Performed by: ANESTHESIOLOGY

## 2024-04-19 PROCEDURE — 250N000011 HC RX IP 250 OP 636: Mod: JZ | Performed by: ANESTHESIOLOGY

## 2024-04-19 PROCEDURE — 88307 TISSUE EXAM BY PATHOLOGIST: CPT | Mod: 26 | Performed by: PATHOLOGY

## 2024-04-19 PROCEDURE — 88307 TISSUE EXAM BY PATHOLOGIST: CPT | Mod: TC | Performed by: COLON & RECTAL SURGERY

## 2024-04-19 PROCEDURE — 84702 CHORIONIC GONADOTROPIN TEST: CPT | Performed by: ANESTHESIOLOGY

## 2024-04-19 PROCEDURE — 250N000009 HC RX 250: Performed by: COLON & RECTAL SURGERY

## 2024-04-19 PROCEDURE — 86900 BLOOD TYPING SEROLOGIC ABO: CPT | Performed by: COLON & RECTAL SURGERY

## 2024-04-19 PROCEDURE — 36415 COLL VENOUS BLD VENIPUNCTURE: CPT | Performed by: ANESTHESIOLOGY

## 2024-04-19 PROCEDURE — 82565 ASSAY OF CREATININE: CPT | Performed by: COLON & RECTAL SURGERY

## 2024-04-19 RX ORDER — KETOROLAC TROMETHAMINE 15 MG/ML
15 INJECTION, SOLUTION INTRAMUSCULAR; INTRAVENOUS EVERY 6 HOURS PRN
Status: DISCONTINUED | OUTPATIENT
Start: 2024-04-19 | End: 2024-04-19

## 2024-04-19 RX ORDER — FENTANYL CITRATE 0.05 MG/ML
50 INJECTION, SOLUTION INTRAMUSCULAR; INTRAVENOUS
Status: DISCONTINUED | OUTPATIENT
Start: 2024-04-19 | End: 2024-04-19

## 2024-04-19 RX ORDER — BUPIVACAINE HYDROCHLORIDE 5 MG/ML
INJECTION, SOLUTION PERINEURAL PRN
Status: DISCONTINUED | OUTPATIENT
Start: 2024-04-19 | End: 2024-04-19 | Stop reason: HOSPADM

## 2024-04-19 RX ORDER — METRONIDAZOLE 500 MG/100ML
500 INJECTION, SOLUTION INTRAVENOUS EVERY 12 HOURS
Qty: 200 ML | Refills: 0 | Status: COMPLETED | OUTPATIENT
Start: 2024-04-19 | End: 2024-04-20

## 2024-04-19 RX ORDER — ACETAMINOPHEN 325 MG/1
650 TABLET ORAL EVERY 4 HOURS PRN
Status: DISCONTINUED | OUTPATIENT
Start: 2024-04-22 | End: 2024-04-21 | Stop reason: HOSPADM

## 2024-04-19 RX ORDER — ONDANSETRON 2 MG/ML
4 INJECTION INTRAMUSCULAR; INTRAVENOUS EVERY 30 MIN PRN
Status: DISCONTINUED | OUTPATIENT
Start: 2024-04-19 | End: 2024-04-19 | Stop reason: HOSPADM

## 2024-04-19 RX ORDER — ONDANSETRON 2 MG/ML
4 INJECTION INTRAMUSCULAR; INTRAVENOUS EVERY 6 HOURS PRN
Status: DISCONTINUED | OUTPATIENT
Start: 2024-04-19 | End: 2024-04-21 | Stop reason: HOSPADM

## 2024-04-19 RX ORDER — LIDOCAINE HYDROCHLORIDE 20 MG/ML
INJECTION, SOLUTION INFILTRATION; PERINEURAL PRN
Status: DISCONTINUED | OUTPATIENT
Start: 2024-04-19 | End: 2024-04-19

## 2024-04-19 RX ORDER — KETOROLAC TROMETHAMINE 30 MG/ML
INJECTION, SOLUTION INTRAMUSCULAR; INTRAVENOUS PRN
Status: DISCONTINUED | OUTPATIENT
Start: 2024-04-19 | End: 2024-04-19

## 2024-04-19 RX ORDER — HYDROMORPHONE HCL IN WATER/PF 6 MG/30 ML
0.2 PATIENT CONTROLLED ANALGESIA SYRINGE INTRAVENOUS
Status: DISCONTINUED | OUTPATIENT
Start: 2024-04-19 | End: 2024-04-21 | Stop reason: HOSPADM

## 2024-04-19 RX ORDER — HYDROMORPHONE HCL IN WATER/PF 6 MG/30 ML
0.2 PATIENT CONTROLLED ANALGESIA SYRINGE INTRAVENOUS EVERY 5 MIN PRN
Status: DISCONTINUED | OUTPATIENT
Start: 2024-04-19 | End: 2024-04-19 | Stop reason: HOSPADM

## 2024-04-19 RX ORDER — FENTANYL CITRATE 50 UG/ML
INJECTION, SOLUTION INTRAMUSCULAR; INTRAVENOUS PRN
Status: DISCONTINUED | OUTPATIENT
Start: 2024-04-19 | End: 2024-04-19

## 2024-04-19 RX ORDER — ONDANSETRON 2 MG/ML
4 INJECTION INTRAMUSCULAR; INTRAVENOUS ONCE
Status: COMPLETED | OUTPATIENT
Start: 2024-04-19 | End: 2024-04-19

## 2024-04-19 RX ORDER — INDOCYANINE GREEN AND WATER 25 MG
KIT INJECTION PRN
Status: DISCONTINUED | OUTPATIENT
Start: 2024-04-19 | End: 2024-04-19

## 2024-04-19 RX ORDER — PROPOFOL 10 MG/ML
INJECTION, EMULSION INTRAVENOUS CONTINUOUS PRN
Status: DISCONTINUED | OUTPATIENT
Start: 2024-04-19 | End: 2024-04-19

## 2024-04-19 RX ORDER — SODIUM CHLORIDE, SODIUM LACTATE, POTASSIUM CHLORIDE, CALCIUM CHLORIDE 600; 310; 30; 20 MG/100ML; MG/100ML; MG/100ML; MG/100ML
INJECTION, SOLUTION INTRAVENOUS CONTINUOUS
Status: DISCONTINUED | OUTPATIENT
Start: 2024-04-19 | End: 2024-04-19 | Stop reason: ALTCHOICE

## 2024-04-19 RX ORDER — NALOXONE HYDROCHLORIDE 0.4 MG/ML
0.4 INJECTION, SOLUTION INTRAMUSCULAR; INTRAVENOUS; SUBCUTANEOUS
Status: DISCONTINUED | OUTPATIENT
Start: 2024-04-19 | End: 2024-04-21 | Stop reason: HOSPADM

## 2024-04-19 RX ORDER — SODIUM CHLORIDE, SODIUM LACTATE, POTASSIUM CHLORIDE, CALCIUM CHLORIDE 600; 310; 30; 20 MG/100ML; MG/100ML; MG/100ML; MG/100ML
INJECTION, SOLUTION INTRAVENOUS CONTINUOUS PRN
Status: DISCONTINUED | OUTPATIENT
Start: 2024-04-19 | End: 2024-04-19

## 2024-04-19 RX ORDER — DEXMEDETOMIDINE HYDROCHLORIDE 4 UG/ML
INJECTION, SOLUTION INTRAVENOUS PRN
Status: DISCONTINUED | OUTPATIENT
Start: 2024-04-19 | End: 2024-04-19

## 2024-04-19 RX ORDER — ACETAMINOPHEN 325 MG/1
975 TABLET ORAL EVERY 8 HOURS
Qty: 27 TABLET | Refills: 0 | Status: DISCONTINUED | OUTPATIENT
Start: 2024-04-19 | End: 2024-04-21 | Stop reason: HOSPADM

## 2024-04-19 RX ORDER — PROPOFOL 10 MG/ML
INJECTION, EMULSION INTRAVENOUS PRN
Status: DISCONTINUED | OUTPATIENT
Start: 2024-04-19 | End: 2024-04-19

## 2024-04-19 RX ORDER — NALOXONE HYDROCHLORIDE 0.4 MG/ML
0.2 INJECTION, SOLUTION INTRAMUSCULAR; INTRAVENOUS; SUBCUTANEOUS
Status: DISCONTINUED | OUTPATIENT
Start: 2024-04-19 | End: 2024-04-21 | Stop reason: HOSPADM

## 2024-04-19 RX ORDER — LIDOCAINE 40 MG/G
CREAM TOPICAL
Status: DISCONTINUED | OUTPATIENT
Start: 2024-04-19 | End: 2024-04-21 | Stop reason: HOSPADM

## 2024-04-19 RX ORDER — HYDROMORPHONE HCL IN WATER/PF 6 MG/30 ML
0.4 PATIENT CONTROLLED ANALGESIA SYRINGE INTRAVENOUS
Status: DISCONTINUED | OUTPATIENT
Start: 2024-04-19 | End: 2024-04-21 | Stop reason: HOSPADM

## 2024-04-19 RX ORDER — CEFAZOLIN SODIUM/WATER 2 G/20 ML
2 SYRINGE (ML) INTRAVENOUS
Status: COMPLETED | OUTPATIENT
Start: 2024-04-19 | End: 2024-04-19

## 2024-04-19 RX ORDER — KETOROLAC TROMETHAMINE 15 MG/ML
15 INJECTION, SOLUTION INTRAMUSCULAR; INTRAVENOUS EVERY 6 HOURS PRN
Status: DISCONTINUED | OUTPATIENT
Start: 2024-04-19 | End: 2024-04-21 | Stop reason: HOSPADM

## 2024-04-19 RX ORDER — HEPARIN SODIUM 5000 [USP'U]/.5ML
5000 INJECTION, SOLUTION INTRAVENOUS; SUBCUTANEOUS
Status: COMPLETED | OUTPATIENT
Start: 2024-04-19 | End: 2024-04-19

## 2024-04-19 RX ORDER — OXYCODONE HYDROCHLORIDE 5 MG/1
10 TABLET ORAL EVERY 4 HOURS PRN
Status: DISCONTINUED | OUTPATIENT
Start: 2024-04-19 | End: 2024-04-21 | Stop reason: HOSPADM

## 2024-04-19 RX ORDER — DROPERIDOL 2.5 MG/ML
0.62 INJECTION, SOLUTION INTRAMUSCULAR; INTRAVENOUS ONCE
Status: COMPLETED | OUTPATIENT
Start: 2024-04-19 | End: 2024-04-19

## 2024-04-19 RX ORDER — IBUPROFEN 600 MG/1
600 TABLET, FILM COATED ORAL EVERY 6 HOURS
Status: DISCONTINUED | OUTPATIENT
Start: 2024-04-23 | End: 2024-04-19

## 2024-04-19 RX ORDER — SODIUM CHLORIDE, SODIUM LACTATE, POTASSIUM CHLORIDE, CALCIUM CHLORIDE 600; 310; 30; 20 MG/100ML; MG/100ML; MG/100ML; MG/100ML
INJECTION, SOLUTION INTRAVENOUS CONTINUOUS
Status: DISCONTINUED | OUTPATIENT
Start: 2024-04-19 | End: 2024-04-19 | Stop reason: HOSPADM

## 2024-04-19 RX ORDER — HYDROMORPHONE HCL IN WATER/PF 6 MG/30 ML
0.4 PATIENT CONTROLLED ANALGESIA SYRINGE INTRAVENOUS EVERY 5 MIN PRN
Status: DISCONTINUED | OUTPATIENT
Start: 2024-04-19 | End: 2024-04-19 | Stop reason: HOSPADM

## 2024-04-19 RX ORDER — DEXAMETHASONE SODIUM PHOSPHATE 4 MG/ML
INJECTION, SOLUTION INTRA-ARTICULAR; INTRALESIONAL; INTRAMUSCULAR; INTRAVENOUS; SOFT TISSUE PRN
Status: DISCONTINUED | OUTPATIENT
Start: 2024-04-19 | End: 2024-04-19

## 2024-04-19 RX ORDER — CEFAZOLIN SODIUM/WATER 2 G/20 ML
2 SYRINGE (ML) INTRAVENOUS SEE ADMIN INSTRUCTIONS
Status: DISCONTINUED | OUTPATIENT
Start: 2024-04-19 | End: 2024-04-19

## 2024-04-19 RX ORDER — ONDANSETRON 4 MG/1
4 TABLET, ORALLY DISINTEGRATING ORAL EVERY 30 MIN PRN
Status: DISCONTINUED | OUTPATIENT
Start: 2024-04-19 | End: 2024-04-19 | Stop reason: HOSPADM

## 2024-04-19 RX ORDER — METRONIDAZOLE 500 MG/100ML
500 INJECTION, SOLUTION INTRAVENOUS
Status: COMPLETED | OUTPATIENT
Start: 2024-04-19 | End: 2024-04-19

## 2024-04-19 RX ORDER — ENOXAPARIN SODIUM 100 MG/ML
40 INJECTION SUBCUTANEOUS EVERY 24 HOURS
Status: DISCONTINUED | OUTPATIENT
Start: 2024-04-20 | End: 2024-04-21 | Stop reason: HOSPADM

## 2024-04-19 RX ORDER — ACETAMINOPHEN 325 MG/1
975 TABLET ORAL ONCE
Status: COMPLETED | OUTPATIENT
Start: 2024-04-19 | End: 2024-04-19

## 2024-04-19 RX ORDER — NALOXONE HYDROCHLORIDE 0.4 MG/ML
0.1 INJECTION, SOLUTION INTRAMUSCULAR; INTRAVENOUS; SUBCUTANEOUS
Status: DISCONTINUED | OUTPATIENT
Start: 2024-04-19 | End: 2024-04-19 | Stop reason: HOSPADM

## 2024-04-19 RX ORDER — CEFAZOLIN SODIUM 1 G/3ML
1 INJECTION, POWDER, FOR SOLUTION INTRAMUSCULAR; INTRAVENOUS EVERY 8 HOURS
Qty: 15 ML | Refills: 0 | Status: COMPLETED | OUTPATIENT
Start: 2024-04-19 | End: 2024-04-20

## 2024-04-19 RX ORDER — ONDANSETRON 4 MG/1
4 TABLET, ORALLY DISINTEGRATING ORAL EVERY 6 HOURS PRN
Status: DISCONTINUED | OUTPATIENT
Start: 2024-04-19 | End: 2024-04-21 | Stop reason: HOSPADM

## 2024-04-19 RX ORDER — MAGNESIUM HYDROXIDE 1200 MG/15ML
LIQUID ORAL PRN
Status: DISCONTINUED | OUTPATIENT
Start: 2024-04-19 | End: 2024-04-19 | Stop reason: HOSPADM

## 2024-04-19 RX ORDER — SCOLOPAMINE TRANSDERMAL SYSTEM 1 MG/1
1 PATCH, EXTENDED RELEASE TRANSDERMAL
Status: DISCONTINUED | OUTPATIENT
Start: 2024-04-19 | End: 2024-04-19 | Stop reason: HOSPADM

## 2024-04-19 RX ORDER — FENTANYL CITRATE 0.05 MG/ML
25 INJECTION, SOLUTION INTRAMUSCULAR; INTRAVENOUS EVERY 5 MIN PRN
Status: DISCONTINUED | OUTPATIENT
Start: 2024-04-19 | End: 2024-04-19 | Stop reason: HOSPADM

## 2024-04-19 RX ORDER — OXYCODONE HYDROCHLORIDE 5 MG/1
5 TABLET ORAL EVERY 4 HOURS PRN
Status: DISCONTINUED | OUTPATIENT
Start: 2024-04-19 | End: 2024-04-21 | Stop reason: HOSPADM

## 2024-04-19 RX ORDER — IBUPROFEN 600 MG/1
600 TABLET, FILM COATED ORAL EVERY 6 HOURS PRN
Status: DISCONTINUED | OUTPATIENT
Start: 2024-04-23 | End: 2024-04-21 | Stop reason: HOSPADM

## 2024-04-19 RX ORDER — SODIUM CHLORIDE, SODIUM LACTATE, POTASSIUM CHLORIDE, CALCIUM CHLORIDE 600; 310; 30; 20 MG/100ML; MG/100ML; MG/100ML; MG/100ML
INJECTION, SOLUTION INTRAVENOUS CONTINUOUS
Status: DISCONTINUED | OUTPATIENT
Start: 2024-04-19 | End: 2024-04-21

## 2024-04-19 RX ORDER — FENTANYL CITRATE 0.05 MG/ML
50 INJECTION, SOLUTION INTRAMUSCULAR; INTRAVENOUS EVERY 5 MIN PRN
Status: DISCONTINUED | OUTPATIENT
Start: 2024-04-19 | End: 2024-04-19 | Stop reason: HOSPADM

## 2024-04-19 RX ADMIN — FENTANYL CITRATE 100 MCG: 50 INJECTION INTRAMUSCULAR; INTRAVENOUS at 07:34

## 2024-04-19 RX ADMIN — PROCHLORPERAZINE EDISYLATE 5 MG: 5 INJECTION INTRAMUSCULAR; INTRAVENOUS at 12:59

## 2024-04-19 RX ADMIN — Medication 2 G: at 07:40

## 2024-04-19 RX ADMIN — SODIUM CHLORIDE, POTASSIUM CHLORIDE, SODIUM LACTATE AND CALCIUM CHLORIDE: 600; 310; 30; 20 INJECTION, SOLUTION INTRAVENOUS at 07:25

## 2024-04-19 RX ADMIN — ONDANSETRON 4 MG: 2 INJECTION INTRAMUSCULAR; INTRAVENOUS at 06:56

## 2024-04-19 RX ADMIN — INDOCYANINE GREEN AND WATER 10 MG: KIT at 09:54

## 2024-04-19 RX ADMIN — OXYCODONE HYDROCHLORIDE 5 MG: 5 TABLET ORAL at 22:07

## 2024-04-19 RX ADMIN — KETOROLAC TROMETHAMINE 15 MG: 30 INJECTION, SOLUTION INTRAMUSCULAR at 11:09

## 2024-04-19 RX ADMIN — PROPOFOL 150 MG: 10 INJECTION, EMULSION INTRAVENOUS at 07:34

## 2024-04-19 RX ADMIN — ROCURONIUM BROMIDE 20 MG: 50 INJECTION, SOLUTION INTRAVENOUS at 08:15

## 2024-04-19 RX ADMIN — METRONIDAZOLE 500 MG: 500 INJECTION, SOLUTION INTRAVENOUS at 07:13

## 2024-04-19 RX ADMIN — INDOCYANINE GREEN AND WATER 10 MG: KIT at 10:33

## 2024-04-19 RX ADMIN — HYDROMORPHONE HYDROCHLORIDE 0.2 MG: 0.2 INJECTION, SOLUTION INTRAMUSCULAR; INTRAVENOUS; SUBCUTANEOUS at 12:42

## 2024-04-19 RX ADMIN — CEFAZOLIN 1 G: 1 INJECTION, POWDER, FOR SOLUTION INTRAMUSCULAR; INTRAVENOUS at 16:46

## 2024-04-19 RX ADMIN — SODIUM CHLORIDE, POTASSIUM CHLORIDE, SODIUM LACTATE AND CALCIUM CHLORIDE: 600; 310; 30; 20 INJECTION, SOLUTION INTRAVENOUS at 14:23

## 2024-04-19 RX ADMIN — ROCURONIUM BROMIDE 10 MG: 50 INJECTION, SOLUTION INTRAVENOUS at 10:27

## 2024-04-19 RX ADMIN — FENTANYL CITRATE 50 MCG: 50 INJECTION INTRAMUSCULAR; INTRAVENOUS at 08:24

## 2024-04-19 RX ADMIN — HEPARIN SODIUM 5000 UNITS: 5000 INJECTION, SOLUTION INTRAVENOUS; SUBCUTANEOUS at 07:07

## 2024-04-19 RX ADMIN — PROPOFOL 50 MG: 10 INJECTION, EMULSION INTRAVENOUS at 07:40

## 2024-04-19 RX ADMIN — HYDROMORPHONE HYDROCHLORIDE 0.2 MG: 0.2 INJECTION, SOLUTION INTRAMUSCULAR; INTRAVENOUS; SUBCUTANEOUS at 14:00

## 2024-04-19 RX ADMIN — HYDROMORPHONE HYDROCHLORIDE 0.2 MG: 0.2 INJECTION, SOLUTION INTRAMUSCULAR; INTRAVENOUS; SUBCUTANEOUS at 17:28

## 2024-04-19 RX ADMIN — SCOPALAMINE 1 PATCH: 1 PATCH, EXTENDED RELEASE TRANSDERMAL at 13:23

## 2024-04-19 RX ADMIN — SUGAMMADEX 200 MG: 100 INJECTION, SOLUTION INTRAVENOUS at 11:27

## 2024-04-19 RX ADMIN — ACETAMINOPHEN 975 MG: 325 TABLET, FILM COATED ORAL at 22:07

## 2024-04-19 RX ADMIN — LIDOCAINE HYDROCHLORIDE 100 MG: 20 INJECTION, SOLUTION INFILTRATION; PERINEURAL at 07:34

## 2024-04-19 RX ADMIN — DROPERIDOL 0.62 MG: 2.5 INJECTION, SOLUTION INTRAMUSCULAR; INTRAVENOUS at 13:24

## 2024-04-19 RX ADMIN — ONDANSETRON 4 MG: 2 INJECTION INTRAMUSCULAR; INTRAVENOUS at 19:02

## 2024-04-19 RX ADMIN — PROPOFOL 50 MCG/KG/MIN: 10 INJECTION, EMULSION INTRAVENOUS at 07:41

## 2024-04-19 RX ADMIN — SODIUM CHLORIDE, POTASSIUM CHLORIDE, SODIUM LACTATE AND CALCIUM CHLORIDE: 600; 310; 30; 20 INJECTION, SOLUTION INTRAVENOUS at 08:45

## 2024-04-19 RX ADMIN — HYDROMORPHONE HYDROCHLORIDE 0.5 MG: 1 INJECTION, SOLUTION INTRAMUSCULAR; INTRAVENOUS; SUBCUTANEOUS at 10:10

## 2024-04-19 RX ADMIN — ACETAMINOPHEN 975 MG: 325 TABLET, FILM COATED ORAL at 06:29

## 2024-04-19 RX ADMIN — FENTANYL CITRATE 50 MCG: 50 INJECTION, SOLUTION INTRAMUSCULAR; INTRAVENOUS at 12:26

## 2024-04-19 RX ADMIN — ROCURONIUM BROMIDE 60 MG: 50 INJECTION, SOLUTION INTRAVENOUS at 07:34

## 2024-04-19 RX ADMIN — DEXAMETHASONE SODIUM PHOSPHATE 4 MG: 4 INJECTION, SOLUTION INTRA-ARTICULAR; INTRALESIONAL; INTRAMUSCULAR; INTRAVENOUS; SOFT TISSUE at 07:44

## 2024-04-19 RX ADMIN — FENTANYL CITRATE 50 MCG: 50 INJECTION INTRAMUSCULAR; INTRAVENOUS at 08:42

## 2024-04-19 RX ADMIN — DEXMEDETOMIDINE HYDROCHLORIDE 20 MCG: 200 INJECTION INTRAVENOUS at 08:00

## 2024-04-19 RX ADMIN — MIDAZOLAM 2 MG: 1 INJECTION INTRAMUSCULAR; INTRAVENOUS at 07:25

## 2024-04-19 RX ADMIN — METRONIDAZOLE 500 MG: 500 INJECTION, SOLUTION INTRAVENOUS at 18:55

## 2024-04-19 RX ADMIN — PROCHLORPERAZINE EDISYLATE 5 MG: 5 INJECTION INTRAMUSCULAR; INTRAVENOUS at 12:27

## 2024-04-19 RX ADMIN — ROCURONIUM BROMIDE 20 MG: 50 INJECTION, SOLUTION INTRAVENOUS at 09:16

## 2024-04-19 RX ADMIN — HYDROMORPHONE HYDROCHLORIDE 0.5 MG: 1 INJECTION, SOLUTION INTRAMUSCULAR; INTRAVENOUS; SUBCUTANEOUS at 08:04

## 2024-04-19 RX ADMIN — PROPOFOL 25 MCG/KG/MIN: 10 INJECTION, EMULSION INTRAVENOUS at 10:35

## 2024-04-19 ASSESSMENT — ACTIVITIES OF DAILY LIVING (ADL)
ADLS_ACUITY_SCORE: 20
ADLS_ACUITY_SCORE: 20
ADLS_ACUITY_SCORE: 22
ADLS_ACUITY_SCORE: 37
ADLS_ACUITY_SCORE: 35
ADLS_ACUITY_SCORE: 20
ADLS_ACUITY_SCORE: 25
ADLS_ACUITY_SCORE: 20
ADLS_ACUITY_SCORE: 20
ADLS_ACUITY_SCORE: 25
ADLS_ACUITY_SCORE: 20
ADLS_ACUITY_SCORE: 22
ADLS_ACUITY_SCORE: 22
ADLS_ACUITY_SCORE: 20

## 2024-04-19 NOTE — ANESTHESIA POSTPROCEDURE EVALUATION
Patient: Anne Costello    Procedure: Procedure(s):  robotic sigmoid colectomy       Anesthesia Type:  General    Note:  Disposition: Inpatient   Postop Pain Control: Uneventful            Sign Out: Well controlled pain   PONV: No   Neuro/Psych: Uneventful            Sign Out: Acceptable/Baseline neuro status   Airway/Respiratory: Uneventful            Sign Out: Acceptable/Baseline resp. status   CV/Hemodynamics: Uneventful            Sign Out: Acceptable CV status; No obvious hypovolemia; No obvious fluid overload   Other NRE: NONE   DID A NON-ROUTINE EVENT OCCUR? No       Last vitals:  Vitals Value Taken Time   /61 04/19/24 1300   Temp     Pulse 89 04/19/24 1308   Resp 12 04/19/24 1308   SpO2 96 % 04/19/24 1308   Vitals shown include unfiled device data.    Electronically Signed By: Devin Stallworth MD  April 19, 2024  1:10 PM

## 2024-04-19 NOTE — ANESTHESIA CARE TRANSFER NOTE
Patient: Anne Costello    Procedure: Procedure(s):  robotic sigmoid colectomy       Diagnosis: Diverticulitis [K57.92]  Diagnosis Additional Information: No value filed.    Anesthesia Type:   General     Note:    Oropharynx: oropharynx clear of all foreign objects  Level of Consciousness: drowsy  Oxygen Supplementation: face mask  Level of Supplemental Oxygen (L/min / FiO2): 7  Independent Airway: airway patency satisfactory and stable  Dentition: dentition unchanged  Vital Signs Stable: post-procedure vital signs reviewed and stable  Report to RN Given: handoff report given  Patient transferred to: PACU  Comments: Spontaneously breathing with adequate vT, sats 98 - 100%, TOF 4/4 with sustained tetany. Purposeful movement, following commands with 100% O2 at 15 L/min, suctioned x2, Anesthesiologist notified.  Extubated.  To PACU with O2 via SFM at 10 L/minute, VSS. Monitors on, report to RN.  Handoff Report: Identifed the Patient, Identified the Reponsible Provider, Reviewed the pertinent medical history, Discussed the surgical course, Reviewed Intra-OP anesthesia mangement and issues during anesthesia, Set expectations for post-procedure period and Allowed opportunity for questions and acknowledgement of understanding      Vitals:  Vitals Value Taken Time   BP     Temp     Pulse 82 04/19/24 1146   Resp 9 04/19/24 1146   SpO2 98 % 04/19/24 1146   Vitals shown include unfiled device data.    Electronically Signed By: GURDEEP Main CRNA  April 19, 2024  11:47 AM

## 2024-04-19 NOTE — OP NOTE
Operative Report    Pre Operative Diagnosis:  1)  Chronic Sigmoid Diverticulitis     Post Operative Diagnosis  1) Same    Surgery:  Robotic sigmoid colectomy  Intraoperative fluorescence angiography  Colorectal anastomosis  Rigid proctoscopy with pneumatic leak test    Surgeon: Carolyn Lima MD  Assistant: Petra Lozoya PA-C  Second Assistant: Alek De Jesus PA-C    A skilled first assistant was necessary due to the technical complexity of the procedure and for the patient's safety.  The assistant helped me with positioning, retraction, visualization of the operative field, meticulous wound closure and moreover helped to complete the procedures in a technically safe and efficient manner.      Anesthesia: General    EBL:  30 mL    Findings: Evidence of chronic diverticulitis with induration and firmness of the sigmoid colon.  This was stuck down into the pelvis and along the left pelvic sidewall.  There was an associated small chronic abscess cavity.  I performed a lateral to medial mobilization of the sigmoid colon.  The distal transection point was in the upper rectum.  The colorectal anastomosis was located at 11 cm from the anal verge.  A pneumatic leak test was negative.      Indication: Anne Costello is a 39 year old female who has a history of sigmoid diverticulitis initially diagnosed in February.  She was hospitalized at that time.  She was managed nonoperatively and was discharged home.  She has had follow-up with me in clinic and several subsequent CT scans which have demonstrated persistent sigmoid diverticulitis.  She also has ongoing symptoms of left lower quadrant abdominal pain.  She was advised of the risks and benefits or surgery.  The risks discussed include but are not limited to the risk of bleeding, anastomotic leak, wound infection, injury to adjacent structures, development of a hernia, need for further surgery including creating a stoma, and even more serious complications.  We discussed  expectations regarding recovery.  She wishes to proceed.    Description of procedure:  After obtaining informed consent the patient was brought to the operating room after a complete bowel prep.  She was placed on the operating table and general anesthesia was induced.  She was intubated by the anesthesia service without difficulty.      Gutiérrez catheter was placed under sterile conditions.  An orogastric tube was placed.  The patient's arms were tucked at her side.  She was secured to the table with tape across the chest.  She was placed in the low modified lithotomy position.  All pressure points were inspected and padded appropriately.  The abdomen was shaved prepped and draped in the usual sterile fashion.  A timeout was undertaken which identified the patient and the correct procedure.    We began by making a stab incision in the left upper quadrant of the abdomen.  A Veres needle was used to establish pneumoperitoneum.  A small incision was made in the supraumbilical position.  An 8 mm robotic trocar was placed here without difficulty.  We then surveyed the abdomen with 30 degree angled robotic camera.  There is no evidence of injury to the underlying viscera and the Veres needle was removed.  The abdomen was surveyed and there is no evidence of other pathology.  We then performed a laparoscopic-assisted TAP block utilizing 30 mL of half percent Marcaine plain.  This was injected in four aliquots; one for each quadrant of the abdomen.    We placed 2 additional 8 mm trochars on the left side of the abdomen running in the line across the abdomen transversely.  We placed an additional 12 mm robotic trocar in the right lower quadrant and a 5 mm air seal trocar in the right upper quadrant.  The air seal device was used to maintain pneumoperitoneum throughout the case.    We then placed the patient in steep Trendelenburg position.  At this point we docked the robot over the patient's left side and centered the robot  on the inferior mesenteric artery.  We utilized a tip up grasper in arm #1 fenestrated bipolar in arm #2 the camera in arm #3 and a monopolar scissors in arm #4.  Arm #4 was switched out to the vessel sealing device and a robotic stapler as needed.  With this instrumentation we performed a mobilization of the colon.    We performed a lateral to medial mobilization by the utilizing the monopolar scissors to incise the lateral attachments of the colon to the pelvic sidewall incising along the white line at Toldt and elevating the colon and its mesentery off the retroperitoneum.   Colon was noted to have evidence of chronic diverticulitis with induration, firmness.  It was stuck to the left pelvic sidewall.  This was bluntly dissected away and there was evidence of a small chronic abscess cavity.  The left ureter was identified and swept posteriorly and kept out of harm's way.  The mobilization was taken from the sacral promontory up to the level of the splenic flexure.  Once this was accomplished we incised the peritoneum medial to the inferior mesenteric artery and it was elevated off the retroperitoneum the left ureter was again identified and the inferior mesenteric artery was isolated.  It was triply ligated with the vessel sealing device and transected with excellent hemostasis.  The adjacent vein was similarly ligated and transected with the vessel sealing device with excellent hemostasis.  The adjacent bare area of the mesentery was then transected with the vessel sealing device as well.    We then turned our attention back to the pelvis.  At this time a point for distal transection was chosen.  The upper rectum was mobilized by incising the areolar attachments of the mesial rectum to the presacral fascia.  The peritoneum along the upper rectum was incised to allow mobilization of the upper rectum.  We then incised the peritoneum of the upper mesorectum up to the level of the bowel wall at the point chosen for  "distal transection.  The mesorectum here was then transected with the vessel sealing device to circumferentially isolate the rectum at this level.  We then transected the bowel at this level with a single firing of a 60 mm green load robotic JOSIAH stapler.  Then identified our proximal point of transection at the junction of the descending and sigmoid colon.  Identified the ligated inferior mesenteric artery.  Using the vessel sealing device the mesocolon was radially divided up to the colonic wall proximal to the divided inferior mesenteric artery vessel.    At this point the robot instruments were removed and the robot was undocked.  The patient was placed in a more neutral position.  We used the robotic camera to identify our proximal staple line.  A grasper was placed on the staple line.  Pneumoperitoneum was evacuated.  We made a small Pfannenstiel incision approximately 3 fingerbreadths above the pubic symphysis dissection was taken down with electrocautery the fascia was incised in a transverse fashion and then elevated off the rectus muscles superiorly and inferiorly.  The muscles fibers of the rectus were spread in the midline and the peritoneum was incised with great care to avoid injury to the bladder.  An Adarsh wound retractor was used to facilitate exposure.  This allowed us to extract the proximal staple line and the specimen through a Pfannenstiel incision.  With the bowel nicely eviscerated a point for proximal transection was chosen.  The bowel was then divided between Joseph clamps.  Specimen was sent off the field labeled as \"sigmoid colon staple line distal\".      We then prepared the proximal bowel for anastomosis.  A 2-0 Prolene suture was then placed in a pursestring fashion around the cut edge of the bowel wall.  The anvil of a 29 mm EEA stapler was then secured within the lumen of the bowel and the suture was tied down to bring the bowel wall type to the shaft of the anvil.  Fat was cleared " of the adjacent bowel wall close to the anvil and a second pursestring suture of the 2-0 Prolene was placed to secure the anvil.  This was then reintroduced into the abdomen.  Pneumoperitoneum was reestablished.    The patient was then again placed in Trendelenburg position.  We utilized the robotic camera as a laparoscope.  The proximal bowel of the descending colon was identified and the anvil was identified.  Prior to creating our anastomosis, we assessed perfusion to our proximal colonic conduit with intraoperative fluorescence angiography.  Four mL of dilute indocyanine green was injected intravenously by the anesthesia team.  We utilized the Firefly technology on the robotic laparoscope to identify blood flow.  We identified excellent perfusion of the bowel to the entire conduit.      Next the small bowel was swept up out of the pelvis.  My assistant then went to the perineal field.  Under my direction sizers were placed via the anus to the top of the rectal stump.  The EEA stapler was then placed via the anus to the top of the rectal stump.  Stapler was flush with the bowel wall here and the spike was deployed through the bowel wall just adjacent to the staple line in its midportion.  Utilizing laparoscopic instruments the anvil was  to the stapler, stapler was closed and fired after ensuring that the proximal bowel was in proper orientation by inspecting the cut edge of the mesentery to its origin.  The stapler was easily retrieved.  The anastomotic rings were inspected and found to be intact.  The pelvis was filled with saline for a pneumatic leak test.  My assistant performed a rigid proctoscopy and a pneumatic leak test was negative.    We closed the right lower quadrant 12 mm trocar site with a Baljit-Carly device utilizing 0 Vicryl tie.  At this point pneumoperitoneum was evacuated.  The Adarsh wound retractor was removed and the peritoneum was reapproximated with a running suture of 2-0  Vicryl.  The fascia was then reapproximated at the site with 2 running sutures of looped 0 PDS.  All skin wounds were closed with 4-0 Monocryl in a subcuticular fashion after irrigation.  Dermabond was placed as a dressing.  The patient was placed in supine position, drapes were taken down, she was awakened extubated and transferred to the PACU in stable condition.  Lap, sponge, and needle counts correct at the end of the case x2.    SPECIMEN: Sigmoid colon, staple line distal    Carolyn Lima MD

## 2024-04-19 NOTE — PROGRESS NOTES
Date & Time: 0792-5078  Surgery/POD#: POD 0 Robotic Sigmoid Colectomy  Behavior & Aggression: Green  Fall Risk: Yes  Orientation:A/Ox4  ABNL VS/O2:VSS on RA  ABNL Labs: NA  Pain Management:PRN Dilaudid   Bowel/Bladder: Gutiérrez   Drains: PIV infusing fluids  Wounds/incisions: 6x lap sites with LB  Diet:CLD  Activity Level: Not OOB-refused due to pain with moving   Anticipated  DC Date: Pending  Significant Information: Emesis x1 when patient tried to dangle. Not passing gas, poor oral intake.

## 2024-04-19 NOTE — BRIEF OP NOTE
[facility]    Brief Operative Note    Pre-operative diagnosis: Diverticulitis [K57.92]  Post-operative diagnosis Same as pre-operative diagnosis    Procedure: robotic sigmoid colectomy, N/A - Abdomen    Surgeon: Surgeons and Role:     * Mari Lima MD - Primary     * Alek De Jesus PA-C - Assisting  Anesthesia: General   Estimated Blood Loss: 30 mL from 4/19/2024  7:25 AM to 4/19/2024 11:41 AM      Drains: None  Specimens:   ID Type Source Tests Collected by Time Destination   1 : Sigmoid Colon - staple line distal Tissue Large Intestine, Colon, Sigmoid SURGICAL PATHOLOGY EXAM Mari Lima MD 4/19/2024 10:19 AM      Findings:   Thickened pelvic sidewall abscess cavity. Chronic pelvic sidewall abscess cavity.  Complications: None.  Implants: * No implants in log *      Condition on discharge from OR: Satisfactory    Petra Lozoya PA-C   [facility]   [phone].        ADDENDUM:    PATIENT DATA  Indicate Y or N:  Home O2 No  Hemodialysis  No  Transplant patient  No  Cirrhosis  No  Steroids in last 30 days  No  Immunomodulators in last 30 days  No  Anticoagulation at time of surgery  No   List medication NA  Prior abdominal surgery  Yes  Pelvic irradiation  No    Albumin within 30 days if known NA  Hgb within 30 days if known NA   Hemoglobin   Date Value Ref Range Status   03/14/2024 13.9 11.7 - 15.7 g/dL Final   03/26/2018 10.4 (L) 11.7 - 15.7 g/dL Final   ]  Cr within 30 days if known NA   Creatinine   Date Value Ref Range Status   03/14/2024 0.71 0.51 - 0.95 mg/dL Final   ]  Body mass index is 27.31 kg/m .      OR DATA  Emergent  No   <24 hours  No   <1 week  No  Bowel Prep Yes  Antibiotics  Yes  DVT prophylaxis    Heparin  Yes   SCD  Yes   None  No  Drain  No  ASA (1,2,3,4) 2  OR time (min) 210 min  Stents  No  Transfuse >/= 2U  No  Anastomosis   Stapled  Yes   Handsewn  No  Leak Test    Positive  No   Negative  Yes   Not done   No

## 2024-04-19 NOTE — ANESTHESIA PROCEDURE NOTES
Airway       Patient location during procedure: OR       Procedure Start/Stop Times: 4/19/2024 7:37 AM  Staff -        CRNA: Maddy Stratton APRN CRNA       Performed By: CRNA  Consent for Airway        Urgency: elective  Indications and Patient Condition       Indications for airway management: jeffy-procedural       Induction type:intravenous       Mask difficulty assessment: 1 - vent by mask    Final Airway Details       Final airway type: endotracheal airway       Successful airway: ETT - single  Endotracheal Airway Details        ETT size (mm): 7.0       Cuffed: yes       Successful intubation technique: direct laryngoscopy       DL Blade Type: MAC 3       Grade View of Cords: 1       Adjucts: stylet       Position: Right       Measured from: gums/teeth       Secured at (cm): 22       Bite block used: None    Post intubation assessment        Placement verified by: capnometry, equal breath sounds and chest rise        Number of attempts at approach: 1       Number of other approaches attempted: 0       Secured with: tape       Ease of procedure: easy       Dentition: Intact and Unchanged    Medication(s) Administered   Medication Administration Time: 4/19/2024 7:37 AM

## 2024-04-20 LAB
ANION GAP SERPL CALCULATED.3IONS-SCNC: 8 MMOL/L (ref 7–15)
BUN SERPL-MCNC: 8 MG/DL (ref 6–20)
CALCIUM SERPL-MCNC: 8.4 MG/DL (ref 8.6–10)
CHLORIDE SERPL-SCNC: 106 MMOL/L (ref 98–107)
CREAT SERPL-MCNC: 0.66 MG/DL (ref 0.51–0.95)
DEPRECATED HCO3 PLAS-SCNC: 26 MMOL/L (ref 22–29)
EGFRCR SERPLBLD CKD-EPI 2021: >90 ML/MIN/1.73M2
ERYTHROCYTE [DISTWIDTH] IN BLOOD BY AUTOMATED COUNT: 12.4 % (ref 10–15)
GLUCOSE BLDC GLUCOMTR-MCNC: 91 MG/DL (ref 70–99)
GLUCOSE SERPL-MCNC: 93 MG/DL (ref 70–99)
HCT VFR BLD AUTO: 34.6 % (ref 35–47)
HGB BLD-MCNC: 11.3 G/DL (ref 11.7–15.7)
MAGNESIUM SERPL-MCNC: 1.8 MG/DL (ref 1.7–2.3)
MCH RBC QN AUTO: 29.9 PG (ref 26.5–33)
MCHC RBC AUTO-ENTMCNC: 32.7 G/DL (ref 31.5–36.5)
MCV RBC AUTO: 92 FL (ref 78–100)
PHOSPHATE SERPL-MCNC: 3 MG/DL (ref 2.5–4.5)
PLATELET # BLD AUTO: 191 10E3/UL (ref 150–450)
POTASSIUM SERPL-SCNC: 4.4 MMOL/L (ref 3.4–5.3)
RBC # BLD AUTO: 3.78 10E6/UL (ref 3.8–5.2)
SODIUM SERPL-SCNC: 140 MMOL/L (ref 135–145)
WBC # BLD AUTO: 8.6 10E3/UL (ref 4–11)

## 2024-04-20 PROCEDURE — 85027 COMPLETE CBC AUTOMATED: CPT | Performed by: COLON & RECTAL SURGERY

## 2024-04-20 PROCEDURE — 250N000013 HC RX MED GY IP 250 OP 250 PS 637: Performed by: COLON & RECTAL SURGERY

## 2024-04-20 PROCEDURE — 120N000001 HC R&B MED SURG/OB

## 2024-04-20 PROCEDURE — 258N000003 HC RX IP 258 OP 636: Performed by: COLON & RECTAL SURGERY

## 2024-04-20 PROCEDURE — 36415 COLL VENOUS BLD VENIPUNCTURE: CPT | Performed by: COLON & RECTAL SURGERY

## 2024-04-20 PROCEDURE — 250N000011 HC RX IP 250 OP 636: Performed by: COLON & RECTAL SURGERY

## 2024-04-20 PROCEDURE — 84100 ASSAY OF PHOSPHORUS: CPT | Performed by: COLON & RECTAL SURGERY

## 2024-04-20 PROCEDURE — 80048 BASIC METABOLIC PNL TOTAL CA: CPT | Performed by: COLON & RECTAL SURGERY

## 2024-04-20 PROCEDURE — 83735 ASSAY OF MAGNESIUM: CPT | Performed by: COLON & RECTAL SURGERY

## 2024-04-20 RX ADMIN — METRONIDAZOLE 500 MG: 500 INJECTION, SOLUTION INTRAVENOUS at 06:01

## 2024-04-20 RX ADMIN — CEFAZOLIN 1 G: 1 INJECTION, POWDER, FOR SOLUTION INTRAMUSCULAR; INTRAVENOUS at 00:28

## 2024-04-20 RX ADMIN — OXYCODONE HYDROCHLORIDE 5 MG: 5 TABLET ORAL at 12:29

## 2024-04-20 RX ADMIN — ACETAMINOPHEN 975 MG: 325 TABLET, FILM COATED ORAL at 13:40

## 2024-04-20 RX ADMIN — ACETAMINOPHEN 975 MG: 325 TABLET, FILM COATED ORAL at 21:08

## 2024-04-20 RX ADMIN — SODIUM CHLORIDE, POTASSIUM CHLORIDE, SODIUM LACTATE AND CALCIUM CHLORIDE: 600; 310; 30; 20 INJECTION, SOLUTION INTRAVENOUS at 05:40

## 2024-04-20 RX ADMIN — ACETAMINOPHEN 975 MG: 325 TABLET, FILM COATED ORAL at 05:59

## 2024-04-20 RX ADMIN — CEFAZOLIN 1 G: 1 INJECTION, POWDER, FOR SOLUTION INTRAMUSCULAR; INTRAVENOUS at 08:21

## 2024-04-20 RX ADMIN — SODIUM CHLORIDE, POTASSIUM CHLORIDE, SODIUM LACTATE AND CALCIUM CHLORIDE: 600; 310; 30; 20 INJECTION, SOLUTION INTRAVENOUS at 20:01

## 2024-04-20 RX ADMIN — OXYCODONE HYDROCHLORIDE 5 MG: 5 TABLET ORAL at 21:08

## 2024-04-20 RX ADMIN — OXYCODONE HYDROCHLORIDE 5 MG: 5 TABLET ORAL at 16:24

## 2024-04-20 RX ADMIN — ENOXAPARIN SODIUM 40 MG: 40 INJECTION SUBCUTANEOUS at 08:21

## 2024-04-20 RX ADMIN — OXYCODONE HYDROCHLORIDE 5 MG: 5 TABLET ORAL at 06:00

## 2024-04-20 ASSESSMENT — ACTIVITIES OF DAILY LIVING (ADL)
ADLS_ACUITY_SCORE: 25

## 2024-04-20 NOTE — PROGRESS NOTES
"[facility] Progress Note             Interval History:     Postop Day #: 1 robotic sigmoid colectomy   Up walking some last night. Pain managed okay. No nausea with clears. Did vomit once after surgery but not since.                Medications:   I have reviewed this patient's current medications               Physical Exam:   Blood pressure 117/60, pulse 72, temperature 98.2  F (36.8  C), temperature source Oral, resp. rate 14, height 1.727 m (5' 8\"), weight 81.5 kg (179 lb 9.6 oz), last menstrual period 02/25/2024, SpO2 96%, not currently breastfeeding.    Intake/Output Summary (Last 24 hours) at 4/20/2024 1030  Last data filed at 4/20/2024 0540  Gross per 24 hour   Intake 2316.25 ml   Output 705 ml   Net 1611.25 ml     GEN:  alert  ABD:  soft, incisions CDI with glue         Data:        Lab Results   Component Value Date     04/20/2024    Lab Results   Component Value Date    CHLORIDE 106 04/20/2024    Lab Results   Component Value Date    BUN 8.0 04/20/2024      Lab Results   Component Value Date    POTASSIUM 4.4 04/20/2024    Lab Results   Component Value Date    CO2 26 04/20/2024    Lab Results   Component Value Date    CR 0.66 04/20/2024    CR 0.63 04/19/2024        Lab Results   Component Value Date    HGB 11.3 (L) 04/20/2024    HGB 13.9 03/14/2024     Lab Results   Component Value Date     04/20/2024     03/14/2024     Lab Results   Component Value Date    WBC 8.6 04/20/2024    WBC 6.1 03/14/2024            Assessment and Plan:     Doing well  Advance to fulls  Remove cross  Okay to shower  Possible discharge tomorrow if doing well.   Lovenox now but will not need at discharge.       Clinically Significant Risk Factors                         # Overweight: Estimated body mass index is 27.31 kg/m  as calculated from the following:    Height as of this encounter: 1.727 m (5' 8\").    Weight as of this encounter: 81.5 kg (179 lb 9.6 oz)., PRESENT ON ADMISSION              Joyce" MD Jean  [facility]  [address], [address]  [place] [address]  T: [phone]  F: [phone]   [email or web address]

## 2024-04-20 NOTE — PROGRESS NOTES
Date & Time: 0700-1900  Surgery/POD#: POD 1 Robotic Sigmoid Colectomy  Behavior & Aggression: Green  Fall Risk: Yes  Orientation:A/Ox4  ABNL VS/O2:VSS on RA  ABNL Labs: NA  Pain Management:PRN Oxy  Bowel/Bladder: Voiding in BR  Drains: PIV infusing fluids  Wounds/incisions: 6x lap sites with LB  Diet:FLD  Activity Level: SBA  Anticipated  DC Date: Possible discharge tomorrow  Significant Information: Passing gas, tolerating diet, cross removed in AM, voiding.

## 2024-04-20 NOTE — PLAN OF CARE
Goal Outcome Evaluation:      Plan of Care Reviewed With: patient    Overall Patient Progress: improvingOverall Patient Progress: improving      POD 1 from Robotic Sigmoid Colectomy. A&Ox4. VSS on RA. Pain managed with schedule tylenol and prn oxy. 6 lap sites CDI, ELLEN. Hypo BS, no passing flatus or BM. Tolerating clear liquid diet. PIV infusing LR at 75ml/hr. Gutiérrez in place. Up A1 gt/w. ambulated in room. Discharge pending.

## 2024-04-21 VITALS
OXYGEN SATURATION: 98 % | WEIGHT: 179.6 LBS | HEIGHT: 68 IN | RESPIRATION RATE: 16 BRPM | HEART RATE: 65 BPM | TEMPERATURE: 98.8 F | SYSTOLIC BLOOD PRESSURE: 126 MMHG | BODY MASS INDEX: 27.22 KG/M2 | DIASTOLIC BLOOD PRESSURE: 76 MMHG

## 2024-04-21 LAB — GLUCOSE BLDC GLUCOMTR-MCNC: 84 MG/DL (ref 70–99)

## 2024-04-21 PROCEDURE — 250N000011 HC RX IP 250 OP 636: Performed by: COLON & RECTAL SURGERY

## 2024-04-21 PROCEDURE — 250N000013 HC RX MED GY IP 250 OP 250 PS 637: Performed by: COLON & RECTAL SURGERY

## 2024-04-21 RX ORDER — OXYCODONE HYDROCHLORIDE 5 MG/1
5 TABLET ORAL EVERY 4 HOURS PRN
Qty: 10 TABLET | Refills: 0 | Status: SHIPPED | OUTPATIENT
Start: 2024-04-21

## 2024-04-21 RX ADMIN — ACETAMINOPHEN 975 MG: 325 TABLET, FILM COATED ORAL at 13:41

## 2024-04-21 RX ADMIN — OXYCODONE HYDROCHLORIDE 5 MG: 5 TABLET ORAL at 12:32

## 2024-04-21 RX ADMIN — ACETAMINOPHEN 975 MG: 325 TABLET, FILM COATED ORAL at 06:05

## 2024-04-21 RX ADMIN — OXYCODONE HYDROCHLORIDE 5 MG: 5 TABLET ORAL at 06:05

## 2024-04-21 RX ADMIN — ENOXAPARIN SODIUM 40 MG: 40 INJECTION SUBCUTANEOUS at 08:46

## 2024-04-21 RX ADMIN — OXYCODONE HYDROCHLORIDE 5 MG: 5 TABLET ORAL at 16:08

## 2024-04-21 ASSESSMENT — ACTIVITIES OF DAILY LIVING (ADL)
ADLS_ACUITY_SCORE: 24

## 2024-04-21 NOTE — DISCHARGE INSTRUCTIONS
Follow up in the office in 4 weeks with Dr. Lima or at your already scheduled post op visit. Call [phone] to schedule your appointment.     Call the office at [phone] if you develop fever >101, uncontrolled pain, bleeding, nausea, vomiting, or constipation (no stool for 4-5 days).     No heavy lifting >15 pounds for 6 weeks. No driving while taking narcotic pain medications.  Continue a low fiber diet for one week, then may resume a regular diet.

## 2024-04-21 NOTE — PLAN OF CARE
Goal Outcome Evaluation:      Plan of Care Reviewed With: patient    Overall Patient Progress: improvingOverall Patient Progress: improving    POD 2 Robotic Sigmoid Colectomy. A&Ox4. VSS on RA. Pain managed with schedule tylenol and prn oxy x2. 6 lap sites CDI, ELLEN. Hypo BS, passing flatus, no BM. Tolerating full liquid diet. PIV infusing LR at 75ml/hr. Void adequately. Up independently, ambulated in room & halls. Plan for possible discharge home today.

## 2024-04-22 ENCOUNTER — PATIENT OUTREACH (OUTPATIENT)
Dept: CARE COORDINATION | Facility: CLINIC | Age: 39
End: 2024-04-22
Payer: COMMERCIAL

## 2024-04-22 LAB
PATH REPORT.COMMENTS IMP SPEC: NORMAL
PATH REPORT.COMMENTS IMP SPEC: NORMAL
PATH REPORT.FINAL DX SPEC: NORMAL
PATH REPORT.GROSS SPEC: NORMAL
PATH REPORT.MICROSCOPIC SPEC OTHER STN: NORMAL
PATH REPORT.RELEVANT HX SPEC: NORMAL
PHOTO IMAGE: NORMAL

## 2024-04-22 NOTE — PROGRESS NOTES
Clinic Care Coordination Contact  [facility]: Post-Discharge Note  SITUATION                                                      Admission:    Admission Date: 04/19/24   Reason for Admission: Chronic sigmoid diverticulitis  Discharge:   Discharge Date: 04/21/24  Discharge Diagnosis: Chronic sigmoid diverticulitis    BACKGROUND                                                      Patient is a 39 year old female who underwent a robotic sigmoid colectomy on 4/19/24 by Dr. Lima.   There were no immediate complications during this procedure.    Please refer to the full operative summary for details.  The patient's hospital course was unremarkable.  Pain was controlled on oral pain regimen.  She was tolerating a low fiber diet.  Bowel function had returned prior to discharge.  She recovered as anticipated and experienced no post-operative complications.           ASSESSMENT           Discharge Assessment  How are you doing now that you are home?: Patient shares that she is doing well, no questions/concerns or needs. Patient did ask about taking 500mg tylenol and writer referred to AVS on medications instructions which patient understood.  How are your symptoms? (Red Flag symptoms escalate to triage hotline per guidelines): Improved  Do you feel your condition is stable enough to be safe at home until your provider visit?: Yes  Does the patient have their discharge instructions? : Yes  Does the patient have questions regarding their discharge instructions? : No  Were you started on any new medications or were there changes to any of your previous medications? : Yes  Does the patient have all of their medications?: Yes  Do you have questions regarding any of your medications? : No  Do you have all of your needed medical supplies or equipment (DME)?  (i.e. oxygen tank, CPAP, cane, etc.): Yes  Discharge follow-up appointment scheduled within 14 calendar days? : No  Is patient agreeable to assistance with scheduling?  : No (Patient will call to schedule 4 week follow up with Dr Lima)    Post-op ([facility] Only)  If the patient had a surgery or procedure, do they have any questions for a nurse?: No    Post-op (Clinicians Only)  Did the patient have surgery or a procedure: Yes  Incision: closed  Drainage: No  Fever: No  Chills: No  Redness: No  Warmth: No  Swelling: No      PLAN                                                      Outpatient Plan:  Follow-up and recommended labs and tests  Follow up in the office in 4 weeks with Dr. Lima or at your already scheduled post op visit. Call [phone] to schedule  your appointment.    No future appointments.      For any urgent concerns, please contact our 24 hour nurse triage line: [phone] ([phone])         MARQUES Helton

## 2024-04-22 NOTE — DISCHARGE SUMMARY
[facility] Discharge Summary      Anne Costello MRN# [id number]   Age: 39 year old YOB: 1985     Date of Admission:  4/19/2024  Date of Discharge::  4/21/2024  4:40 PM  Admitting Physician:  Mari iLma MD  Discharge Physician:  Mari Lima MD     PCP:  Clinic, [facility]    Disposition: Patient discharged from [facility] to home in stable condition.        Primary Diagnosis:   Chronic sigmoid diverticulitis            Discharge Medications:     Discharge Medication List as of 4/21/2024  2:25 PM        START taking these medications    Details   oxyCODONE (ROXICODONE) 5 MG tablet Take 1 tablet (5 mg) by mouth every 4 hours as needed for moderate pain, Disp-10 tablet, R-0, Local Print           CONTINUE these medications which have NOT CHANGED    Details   ibuprofen (ADVIL/MOTRIN) 200 MG tablet Take 200-400 mg by mouth every 6 hours as needed for pain, Historical      ondansetron (ZOFRAN ODT) 4 MG ODT tab Take 1 tablet by mouth every 8 hours as needed, Historical      acetaminophen (TYLENOL) 325 MG tablet Take 2 tablets (650 mg) by mouth every 4 hours as needed for mild pain or other (and adjunct with moderate or severe pain or per patient request), OTC           STOP taking these medications       SENNA-docusate sodium (SENNA S) 8.6-50 MG tablet Comments:   Reason for Stopping:         tamsulosin (FLOMAX) 0.4 MG capsule Comments:   Reason for Stopping:                      Follow Up, Special Instructions:     Discharge diet: Low residue   Discharge activity: Lifting restricted to <15 pounds for 6 weeks   Discharge follow-up: Follow up with Dr. Lima in 4 weeks   Wound care: May get incision wet in shower but do not soak or scrub              Procedures:     Procedure(s): Robotic sigmoid colectomy  Intraoperative fluorescence angiography  Colorectal anastomosis  Rigid proctoscopy with pneumatic leak test                   Consultations:   None           Brief Hospital Summary:     Patient is a 39 year old female who underwent a robotic sigmoid colectomy on 4/19/24 by Dr. Lima.   There were no immediate complications during this procedure.    Please refer to the full operative summary for details.  The patient's hospital course was unremarkable.  Pain was controlled on oral pain regimen.  She was tolerating a low fiber diet.  Bowel function had returned prior to discharge.  She recovered as anticipated and experienced no post-operative complications.         Attestation:  I have reviewed today's vital signs, notes, medications, labs and imaging.    Alek De Jesus PA-C  Colorectal Physician Assistant    [facility]  [address] [facility] [address]  [place] [address]  T: [phone]  F: [phone]         ADDENDUM:  Length of stay: 2 days  Indicate Y or N for the following:  UTI  No  C diff  No  PNA  No  SSI No  DVT No  PE  No  CVA No  MI No  Enterocutaneous fistula  No  Peripheral nerve injury  No  Abscess (not adjacent to anastomosis)  No  Leak No    Treated with:   Antibiotics N/A   Drain  N/A   Reoperation  N/A  Death within 30 days No  Reintubation  No  Reoperation  No   Procedure n/a

## 2024-04-23 LAB
ATRIAL RATE - MUSE: 78 BPM
DIASTOLIC BLOOD PRESSURE - MUSE: NORMAL MMHG
INTERPRETATION ECG - MUSE: NORMAL
P AXIS - MUSE: 70 DEGREES
PR INTERVAL - MUSE: 122 MS
QRS DURATION - MUSE: 82 MS
QT - MUSE: 402 MS
QTC - MUSE: 458 MS
R AXIS - MUSE: 39 DEGREES
SYSTOLIC BLOOD PRESSURE - MUSE: NORMAL MMHG
T AXIS - MUSE: 50 DEGREES
VENTRICULAR RATE- MUSE: 78 BPM

## 2024-06-29 ENCOUNTER — HOSPITAL ENCOUNTER (EMERGENCY)
Facility: CLINIC | Age: 39
Discharge: HOME | End: 2024-06-29
Attending: PHYSICIAN ASSISTANT | Admitting: PHYSICIAN ASSISTANT
Payer: COMMERCIAL

## 2024-06-29 ENCOUNTER — APPOINTMENT (OUTPATIENT)
Dept: CT IMAGING | Facility: CLINIC | Age: 39
End: 2024-06-29
Attending: PHYSICIAN ASSISTANT
Payer: COMMERCIAL

## 2024-06-29 VITALS
TEMPERATURE: 98 F | HEIGHT: 68 IN | SYSTOLIC BLOOD PRESSURE: 119 MMHG | OXYGEN SATURATION: 98 % | BODY MASS INDEX: 26.52 KG/M2 | DIASTOLIC BLOOD PRESSURE: 74 MMHG | WEIGHT: 175 LBS | HEART RATE: 66 BPM | RESPIRATION RATE: 16 BRPM

## 2024-06-29 DIAGNOSIS — K57.90 DIVERTICULOSIS OF INTESTINE: ICD-10-CM

## 2024-06-29 DIAGNOSIS — N83.202 LEFT OVARIAN CYST: ICD-10-CM

## 2024-06-29 DIAGNOSIS — R10.32 LLQ ABDOMINAL PAIN: ICD-10-CM

## 2024-06-29 LAB
ALBUMIN SERPL BCG-MCNC: 4.2 G/DL (ref 3.5–5.2)
ALBUMIN UR-MCNC: NEGATIVE MG/DL
ALP SERPL-CCNC: 60 U/L (ref 40–150)
ALT SERPL W P-5'-P-CCNC: 20 U/L (ref 0–50)
ANION GAP SERPL CALCULATED.3IONS-SCNC: 12 MMOL/L (ref 7–15)
APPEARANCE UR: CLEAR
AST SERPL W P-5'-P-CCNC: 18 U/L (ref 0–45)
BASOPHILS # BLD AUTO: 0 10E3/UL (ref 0–0.2)
BASOPHILS NFR BLD AUTO: 1 %
BILIRUB SERPL-MCNC: 0.2 MG/DL
BILIRUB UR QL STRIP: NEGATIVE
BUN SERPL-MCNC: 13.2 MG/DL (ref 6–20)
CALCIUM SERPL-MCNC: 8.8 MG/DL (ref 8.6–10)
CHLORIDE SERPL-SCNC: 102 MMOL/L (ref 98–107)
COLOR UR AUTO: ABNORMAL
CREAT SERPL-MCNC: 0.64 MG/DL (ref 0.51–0.95)
DEPRECATED HCO3 PLAS-SCNC: 23 MMOL/L (ref 22–29)
EGFRCR SERPLBLD CKD-EPI 2021: >90 ML/MIN/1.73M2
EOSINOPHIL # BLD AUTO: 0.1 10E3/UL (ref 0–0.7)
EOSINOPHIL NFR BLD AUTO: 2 %
ERYTHROCYTE [DISTWIDTH] IN BLOOD BY AUTOMATED COUNT: 12.5 % (ref 10–15)
GLUCOSE SERPL-MCNC: 77 MG/DL (ref 70–99)
GLUCOSE UR STRIP-MCNC: NEGATIVE MG/DL
HCG UR QL: NEGATIVE
HCT VFR BLD AUTO: 41 % (ref 35–47)
HGB BLD-MCNC: 13.2 G/DL (ref 11.7–15.7)
HGB UR QL STRIP: NEGATIVE
IMM GRANULOCYTES # BLD: 0 10E3/UL
IMM GRANULOCYTES NFR BLD: 0 %
KETONES UR STRIP-MCNC: NEGATIVE MG/DL
LEUKOCYTE ESTERASE UR QL STRIP: NEGATIVE
LIPASE SERPL-CCNC: 21 U/L (ref 13–60)
LYMPHOCYTES # BLD AUTO: 1.9 10E3/UL (ref 0.8–5.3)
LYMPHOCYTES NFR BLD AUTO: 27 %
MCH RBC QN AUTO: 29.4 PG (ref 26.5–33)
MCHC RBC AUTO-ENTMCNC: 32.2 G/DL (ref 31.5–36.5)
MCV RBC AUTO: 91 FL (ref 78–100)
MONOCYTES # BLD AUTO: 0.5 10E3/UL (ref 0–1.3)
MONOCYTES NFR BLD AUTO: 7 %
MUCOUS THREADS #/AREA URNS LPF: PRESENT /LPF
NEUTROPHILS # BLD AUTO: 4.5 10E3/UL (ref 1.6–8.3)
NEUTROPHILS NFR BLD AUTO: 63 %
NITRATE UR QL: NEGATIVE
NRBC # BLD AUTO: 0 10E3/UL
NRBC BLD AUTO-RTO: 0 /100
PH UR STRIP: 6 [PH] (ref 5–7)
PLATELET # BLD AUTO: 236 10E3/UL (ref 150–450)
POTASSIUM SERPL-SCNC: 4.1 MMOL/L (ref 3.4–5.3)
PROT SERPL-MCNC: 6.5 G/DL (ref 6.4–8.3)
RBC # BLD AUTO: 4.49 10E6/UL (ref 3.8–5.2)
RBC URINE: 1 /HPF
SODIUM SERPL-SCNC: 137 MMOL/L (ref 135–145)
SP GR UR STRIP: 1.02 (ref 1–1.03)
SQUAMOUS EPITHELIAL: 3 /HPF
UROBILINOGEN UR STRIP-MCNC: NORMAL MG/DL
WBC # BLD AUTO: 7.1 10E3/UL (ref 4–11)
WBC URINE: 1 /HPF

## 2024-06-29 PROCEDURE — 85025 COMPLETE CBC W/AUTO DIFF WBC: CPT | Performed by: STUDENT IN AN ORGANIZED HEALTH CARE EDUCATION/TRAINING PROGRAM

## 2024-06-29 PROCEDURE — 80053 COMPREHEN METABOLIC PANEL: CPT | Performed by: PHYSICIAN ASSISTANT

## 2024-06-29 PROCEDURE — 80053 COMPREHEN METABOLIC PANEL: CPT | Performed by: STUDENT IN AN ORGANIZED HEALTH CARE EDUCATION/TRAINING PROGRAM

## 2024-06-29 PROCEDURE — 81001 URINALYSIS AUTO W/SCOPE: CPT | Performed by: STUDENT IN AN ORGANIZED HEALTH CARE EDUCATION/TRAINING PROGRAM

## 2024-06-29 PROCEDURE — 81001 URINALYSIS AUTO W/SCOPE: CPT | Performed by: PHYSICIAN ASSISTANT

## 2024-06-29 PROCEDURE — 81025 URINE PREGNANCY TEST: CPT | Performed by: PHYSICIAN ASSISTANT

## 2024-06-29 PROCEDURE — 36415 COLL VENOUS BLD VENIPUNCTURE: CPT | Performed by: STUDENT IN AN ORGANIZED HEALTH CARE EDUCATION/TRAINING PROGRAM

## 2024-06-29 PROCEDURE — 85025 COMPLETE CBC W/AUTO DIFF WBC: CPT | Performed by: PHYSICIAN ASSISTANT

## 2024-06-29 PROCEDURE — 250N000009 HC RX 250: Performed by: PHYSICIAN ASSISTANT

## 2024-06-29 PROCEDURE — 83690 ASSAY OF LIPASE: CPT | Performed by: EMERGENCY MEDICINE

## 2024-06-29 PROCEDURE — 99285 EMERGENCY DEPT VISIT HI MDM: CPT | Mod: 25

## 2024-06-29 PROCEDURE — 74177 CT ABD & PELVIS W/CONTRAST: CPT

## 2024-06-29 PROCEDURE — 250N000011 HC RX IP 250 OP 636: Performed by: PHYSICIAN ASSISTANT

## 2024-06-29 RX ORDER — IOPAMIDOL 755 MG/ML
500 INJECTION, SOLUTION INTRAVASCULAR ONCE
Status: COMPLETED | OUTPATIENT
Start: 2024-06-29 | End: 2024-06-29

## 2024-06-29 RX ADMIN — SODIUM CHLORIDE 62 ML: 9 INJECTION, SOLUTION INTRAVENOUS at 21:14

## 2024-06-29 RX ADMIN — IOPAMIDOL 88 ML: 755 INJECTION, SOLUTION INTRAVENOUS at 21:14

## 2024-06-29 ASSESSMENT — ACTIVITIES OF DAILY LIVING (ADL)
ADLS_ACUITY_SCORE: 39
ADLS_ACUITY_SCORE: 37

## 2024-06-29 ASSESSMENT — COLUMBIA-SUICIDE SEVERITY RATING SCALE - C-SSRS
1. IN THE PAST MONTH, HAVE YOU WISHED YOU WERE DEAD OR WISHED YOU COULD GO TO SLEEP AND NOT WAKE UP?: NO
6. HAVE YOU EVER DONE ANYTHING, STARTED TO DO ANYTHING, OR PREPARED TO DO ANYTHING TO END YOUR LIFE?: NO
2. HAVE YOU ACTUALLY HAD ANY THOUGHTS OF KILLING YOURSELF IN THE PAST MONTH?: NO

## 2024-06-29 NOTE — ED TRIAGE NOTES
Arrives from home. States that she was diagnosis with diverticulitis in February. States had surgery in April and continues to have abdominal pain.     Reports that the pain is completely unchanged from when she initially started having symptoms in February.     Reports last bowel movement was today and it was normal.

## 2024-06-30 NOTE — DISCHARGE INSTRUCTIONS
The cause of your symptoms is unclear at this time. There is no evidence for life-threatening or surgical process based upon labs and imaging obtained here today. I would advise following up with your colorectal surgeon with persistent symptoms for recheck. Use Tylenol and ibuprofen for pain. Return with severe worsening of symptoms.

## 2024-06-30 NOTE — ED PROVIDER NOTES
Emergency Department Note      History of Present Illness     Chief Complaint   Abdominal Pain      HPI   Anne Costello is a 39 year old female who presents to the ED with  for evaluation of abdominal pain. Patient notes onset of LLQ pain that began about one year ago. Pain is been intermittent. She describes it at times as needles stabbing, other times a burning sensation. Pain is non-radiating. She states she ignored it for many months, and ultimately was diagnosed with perforated diverticulum and subsequently had partial colectomy. The colectomy was performed about 2 months ago. Given her persistent recurrent pain, she presents today for further evaluation. No fevers or chills. No nausea/vomiting. She notes normal stools, only trial hematochezia, however she notes she has internal hemorrhoids. No urinary symptoms. No vaginal symptoms. She declines anything for pain.    Independent Historian   None    Review of External Notes   None    Past Medical History     Medical History and Problem List   Past Medical History:   Diagnosis Date    Diverticulosis     Hiatal hernia     History of dysplastic nevus     Multiple pigmented nevi        Medications   acetaminophen (TYLENOL) 325 MG tablet  ibuprofen (ADVIL/MOTRIN) 200 MG tablet  ondansetron (ZOFRAN ODT) 4 MG ODT tab  oxyCODONE (ROXICODONE) 5 MG tablet        Surgical History   Past Surgical History:   Procedure Laterality Date     SECTION N/A 2018    Procedure:  SECTION;   Section;  Surgeon: Roney Paez MD;  Location:  OR    COLECTOMY, SIGMOID, ROBOT-ASSISTED, LAPAROSCOPIC, USING DA DELBERT XI N/A 2024    Procedure: robotic sigmoid colectomy;  Surgeon: Mari Lima MD;  Location:  OR    COLONOSCOPY N/A 2024    Procedure: Colonoscopy;  Surgeon: Mari Lima MD;  Location:  GI    Nephrolithiasis  2023    Perforated diverticulum  2024    TONSILLECTOMY & ADENOIDECTOMY    "      Physical Exam     Patient Vitals for the past 24 hrs:   BP Temp Temp src Pulse Resp SpO2 Height Weight   06/29/24 2304 119/74 -- -- 66 16 98 % -- --   06/29/24 1544 (!) 143/89 98  F (36.7  C) Temporal 64 18 99 % 1.727 m (5' 8\") 79.4 kg (175 lb)     Physical Exam  Constitutional: Pleasant. Cooperative.   Eyes: Pupils equally round and reactive  HENT: Head is normal in appearance. Oropharynx is normal with moist mucus membranes.  Cardiovascular: Regular rate and rhythm and without murmurs.  Respiratory: Normal respiratory effort, lungs are clear bilaterally.  GI: LLQ TTP, otherwise non-tender, soft, non-distended. No guarding, rebound, or rigidity.  Musculoskeletal: No asymmetry of the lower extremities.  Skin: Normal, without rash.  Neurologic: Cranial nerves grossly intact, normal cognition, no focal deficits. Alert and oriented x 3.   Psychiatric: Normal affect.  Nursing notes and vital signs reviewed.      Diagnostics     Lab Results   Labs Ordered and Resulted from Time of ED Arrival to Time of ED Departure   ROUTINE UA WITH MICROSCOPIC REFLEX TO CULTURE - Abnormal       Result Value    Color Urine Light Yellow      Appearance Urine Clear      Glucose Urine Negative      Bilirubin Urine Negative      Ketones Urine Negative      Specific Gravity Urine 1.020      Blood Urine Negative      pH Urine 6.0      Protein Albumin Urine Negative      Urobilinogen Urine Normal      Nitrite Urine Negative      Leukocyte Esterase Urine Negative      Mucus Urine Present (*)     RBC Urine 1      WBC Urine 1      Squamous Epithelials Urine 3 (*)    COMPREHENSIVE METABOLIC PANEL - Normal    Sodium 137      Potassium 4.1      Carbon Dioxide (CO2) 23      Anion Gap 12      Urea Nitrogen 13.2      Creatinine 0.64      GFR Estimate >90      Calcium 8.8      Chloride 102      Glucose 77      Alkaline Phosphatase 60      AST 18      ALT 20      Protein Total 6.5      Albumin 4.2      Bilirubin Total 0.2     LIPASE - Normal    " Lipase 21     HCG QUALITATIVE URINE - Normal    hCG Urine Qualitative Negative     CBC WITH PLATELETS AND DIFFERENTIAL    WBC Count 7.1      RBC Count 4.49      Hemoglobin 13.2      Hematocrit 41.0      MCV 91      MCH 29.4      MCHC 32.2      RDW 12.5      Platelet Count 236      % Neutrophils 63      % Lymphocytes 27      % Monocytes 7      % Eosinophils 2      % Basophils 1      % Immature Granulocytes 0      NRBCs per 100 WBC 0      Absolute Neutrophils 4.5      Absolute Lymphocytes 1.9      Absolute Monocytes 0.5      Absolute Eosinophils 0.1      Absolute Basophils 0.0      Absolute Immature Granulocytes 0.0      Absolute NRBCs 0.0         Imaging   CT Abdomen Pelvis w Contrast   Final Result   IMPRESSION:    1. No evidence of acute pathology in the abdomen and pelvis.   2. Colonic diverticulosis predominantly of the sigmoid colon without CT evidence of acute diverticulitis.   3. 2.6 cm left adnexal cyst, likely ovarian.        Independent Interpretation   None    ED Course      Medications Administered   Medications   iopamidol (ISOVUE-370) solution 500 mL (88 mLs Intravenous $Given 6/29/24 2114)   for CT scan flush use (62 mLs Intravenous $Given 6/29/24 2114)       Procedures   Procedures     Discussion of Management   None    Social Determinants of Health adding to complexity of care   None    ED Course        Medical Decision Making / Diagnosis     CMS Diagnoses: None    MIPS       None    MDM   Anne Costello is a 39 year old female with history of recent colectomy who presents to the ED for evaluation of abdominal pain. See HPI as above for additional details. Vitals and physical exam as above. DDx was broad and included diverticulitis, kidney stone, pyelo, shingles,  pathology such as ovarian torsion, cyst, ectopic, SBO, perforated viscus, amongst others. Work up obtained as above. Patient does have evidence for what appears to be ovarian cyst. However, given recurrent symptoms over the past year,  doubt that this is etiology of patient's symptoms. Remainder of work up as above. Discussed with patient the unclear etiology of her symptoms at this time. Advised close outpatient follow up with colorectal with persistent symptoms. Advised Tylenol and ibuprofen for pain. Lower suspicion for remainder of differential based upon work up, history as above. Discussed reasons to return. All questions answered. Patient discharged to home in stable condition.    Disposition   The patient was discharged.     Diagnosis     ICD-10-CM    1. LLQ abdominal pain  R10.32       2. Left ovarian cyst  N83.202       3. Diverticulosis of intestine  K57.90            Discharge Medications   Discharge Medication List as of 6/29/2024 11:01 PM          This record was created at least in part using electronic voice recognition software, so please excuse any typographical errors.         Fernandez Costa PA-C  06/30/24 0037

## 2024-10-03 NOTE — NURSING NOTE
"Chief Complaint   Patient presents with     Prenatal Care     30 weeks 6 days- concerns of period cramps- would like RX for Iron       Initial /62 (BP Location: Left arm, Patient Position: Sitting, Cuff Size: Adult Regular)  Pulse 78  Wt 192 lb (87.1 kg)  LMP 2017 (Approximate)  Breastfeeding? No  BMI 28.35 kg/m2 Estimated body mass index is 28.35 kg/(m^2) as calculated from the following:    Height as of 1/10/18: 5' 9\" (1.753 m).    Weight as of this encounter: 192 lb (87.1 kg).  BP completed using cuff size: regular        The following HM Due: NONE    +FM  +Cramping  -Contractions  -Edema    patient has appointment for today.  30w6d  Doug Castillo CMA                 " 11

## 2025-04-14 ENCOUNTER — APPOINTMENT (OUTPATIENT)
Dept: CT IMAGING | Facility: CLINIC | Age: 40
End: 2025-04-14
Attending: STUDENT IN AN ORGANIZED HEALTH CARE EDUCATION/TRAINING PROGRAM
Payer: COMMERCIAL

## 2025-04-14 ENCOUNTER — HOSPITAL ENCOUNTER (EMERGENCY)
Facility: CLINIC | Age: 40
Discharge: HOME | End: 2025-04-14
Attending: STUDENT IN AN ORGANIZED HEALTH CARE EDUCATION/TRAINING PROGRAM | Admitting: STUDENT IN AN ORGANIZED HEALTH CARE EDUCATION/TRAINING PROGRAM
Payer: COMMERCIAL

## 2025-04-14 VITALS
OXYGEN SATURATION: 100 % | WEIGHT: 200.62 LBS | TEMPERATURE: 96.9 F | HEART RATE: 82 BPM | SYSTOLIC BLOOD PRESSURE: 134 MMHG | DIASTOLIC BLOOD PRESSURE: 74 MMHG | RESPIRATION RATE: 16 BRPM | BODY MASS INDEX: 30.5 KG/M2

## 2025-04-14 DIAGNOSIS — N83.202 CYST OF LEFT OVARY: ICD-10-CM

## 2025-04-14 DIAGNOSIS — M85.38 OSTEITIS CONDENSANS ILII: ICD-10-CM

## 2025-04-14 DIAGNOSIS — K57.30 DIVERTICULOSIS OF LARGE INTESTINE WITHOUT HEMORRHAGE: ICD-10-CM

## 2025-04-14 DIAGNOSIS — R10.32 ABDOMINAL PAIN, LEFT LOWER QUADRANT: ICD-10-CM

## 2025-04-14 LAB
ALBUMIN UR-MCNC: NEGATIVE MG/DL
ANION GAP SERPL CALCULATED.3IONS-SCNC: 8 MMOL/L (ref 7–15)
APPEARANCE UR: CLEAR
BASOPHILS # BLD AUTO: 0.1 10E3/UL (ref 0–0.2)
BASOPHILS NFR BLD AUTO: 1 %
BILIRUB UR QL STRIP: NEGATIVE
BUN SERPL-MCNC: 10.8 MG/DL (ref 6–20)
CALCIUM SERPL-MCNC: 8.7 MG/DL (ref 8.8–10.4)
CHLORIDE SERPL-SCNC: 105 MMOL/L (ref 98–107)
COLOR UR AUTO: ABNORMAL
CREAT SERPL-MCNC: 0.59 MG/DL (ref 0.51–0.95)
EGFRCR SERPLBLD CKD-EPI 2021: >90 ML/MIN/1.73M2
EOSINOPHIL # BLD AUTO: 0.3 10E3/UL (ref 0–0.7)
EOSINOPHIL NFR BLD AUTO: 4 %
ERYTHROCYTE [DISTWIDTH] IN BLOOD BY AUTOMATED COUNT: 12.3 % (ref 10–15)
GLUCOSE SERPL-MCNC: 97 MG/DL (ref 70–99)
GLUCOSE UR STRIP-MCNC: NEGATIVE MG/DL
HCO3 SERPL-SCNC: 26 MMOL/L (ref 22–29)
HCT VFR BLD AUTO: 37 % (ref 35–47)
HGB BLD-MCNC: 12.4 G/DL (ref 11.7–15.7)
HGB UR QL STRIP: NEGATIVE
HOLD SPECIMEN: NORMAL
HOLD SPECIMEN: NORMAL
IMM GRANULOCYTES # BLD: 0 10E3/UL
IMM GRANULOCYTES NFR BLD: 0 %
KETONES UR STRIP-MCNC: NEGATIVE MG/DL
LEUKOCYTE ESTERASE UR QL STRIP: NEGATIVE
LYMPHOCYTES # BLD AUTO: 2 10E3/UL (ref 0.8–5.3)
LYMPHOCYTES NFR BLD AUTO: 28 %
MCH RBC QN AUTO: 30.7 PG (ref 26.5–33)
MCHC RBC AUTO-ENTMCNC: 33.5 G/DL (ref 31.5–36.5)
MCV RBC AUTO: 92 FL (ref 78–100)
MONOCYTES # BLD AUTO: 0.5 10E3/UL (ref 0–1.3)
MONOCYTES NFR BLD AUTO: 6 %
MUCOUS THREADS #/AREA URNS LPF: PRESENT /LPF
NEUTROPHILS # BLD AUTO: 4.5 10E3/UL (ref 1.6–8.3)
NEUTROPHILS NFR BLD AUTO: 61 %
NITRATE UR QL: NEGATIVE
NRBC # BLD AUTO: 0 10E3/UL
NRBC BLD AUTO-RTO: 0 /100
PH UR STRIP: 6.5 [PH] (ref 5–7)
PLATELET # BLD AUTO: 209 10E3/UL (ref 150–450)
POTASSIUM SERPL-SCNC: 3.9 MMOL/L (ref 3.4–5.3)
RBC # BLD AUTO: 4.04 10E6/UL (ref 3.8–5.2)
RBC URINE: 1 /HPF
SODIUM SERPL-SCNC: 139 MMOL/L (ref 135–145)
SP GR UR STRIP: 1.02 (ref 1–1.03)
SQUAMOUS EPITHELIAL: 10 /HPF
UROBILINOGEN UR STRIP-MCNC: NORMAL MG/DL
WBC # BLD AUTO: 7.3 10E3/UL (ref 4–11)
WBC URINE: 1 /HPF

## 2025-04-14 PROCEDURE — 250N000009 HC RX 250: Performed by: STUDENT IN AN ORGANIZED HEALTH CARE EDUCATION/TRAINING PROGRAM

## 2025-04-14 PROCEDURE — 80048 BASIC METABOLIC PNL TOTAL CA: CPT | Performed by: STUDENT IN AN ORGANIZED HEALTH CARE EDUCATION/TRAINING PROGRAM

## 2025-04-14 PROCEDURE — 85025 COMPLETE CBC W/AUTO DIFF WBC: CPT | Performed by: STUDENT IN AN ORGANIZED HEALTH CARE EDUCATION/TRAINING PROGRAM

## 2025-04-14 PROCEDURE — 81001 URINALYSIS AUTO W/SCOPE: CPT | Performed by: EMERGENCY MEDICINE

## 2025-04-14 PROCEDURE — 99285 EMERGENCY DEPT VISIT HI MDM: CPT | Mod: 25

## 2025-04-14 PROCEDURE — 81001 URINALYSIS AUTO W/SCOPE: CPT | Performed by: STUDENT IN AN ORGANIZED HEALTH CARE EDUCATION/TRAINING PROGRAM

## 2025-04-14 PROCEDURE — 74177 CT ABD & PELVIS W/CONTRAST: CPT

## 2025-04-14 PROCEDURE — 36415 COLL VENOUS BLD VENIPUNCTURE: CPT | Performed by: STUDENT IN AN ORGANIZED HEALTH CARE EDUCATION/TRAINING PROGRAM

## 2025-04-14 PROCEDURE — 250N000011 HC RX IP 250 OP 636: Performed by: STUDENT IN AN ORGANIZED HEALTH CARE EDUCATION/TRAINING PROGRAM

## 2025-04-14 RX ORDER — IOPAMIDOL 755 MG/ML
120 INJECTION, SOLUTION INTRAVASCULAR ONCE
Status: COMPLETED | OUTPATIENT
Start: 2025-04-14 | End: 2025-04-14

## 2025-04-14 RX ADMIN — SODIUM CHLORIDE 65 ML: 9 INJECTION, SOLUTION INTRAVENOUS at 19:43

## 2025-04-14 RX ADMIN — IOPAMIDOL 100 ML: 755 INJECTION, SOLUTION INTRAVENOUS at 19:43

## 2025-04-14 ASSESSMENT — ACTIVITIES OF DAILY LIVING (ADL)
ADLS_ACUITY_SCORE: 48

## 2025-04-14 NOTE — ED PROVIDER NOTES
"  Emergency Department Note      History of Present Illness     Chief Complaint  Abdominal Pain    HPI  Anne Costello is a 40 year old female with history of Diverticulosis and ovarian cysts  who presents to the ED with a family member for evaluation of abdominal pain. She reports ongoing lower left abdominal pain for the past year since her colon resection. Patient has seen many specialists for this issue but had it no major findings. They told her to \"give it a year\" and if it hurts to come back. She states this pain has been affecting her daily life. 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 She describes it as \"I feel like something is going to explode\" when she is walking. Sitting helps with the pain. Ibuprofen provides mild relief. Denies urinary symptoms or changes in color. No change of pregnancy.     Independent Historian  None    Review of External Notes  none  Past Medical History   Medical History and Problem List   Diverticulosis    Kidney stones  Dysplastic nevus   Ovarian cysts     Medications   Oxycodone     Surgical History     Colectomy  Colonoscopy  Nephrolithiasis   Tonsillectomy and adenoidectomy  Perforated diverticulum   Physical Exam   Patient Vitals for the past 24 hrs:   BP Temp Temp src Pulse Resp SpO2 Weight   25 1648 134/74 -- -- -- -- -- --   25 1647 -- 96.9  F (36.1  C) Temporal 82 16 100 % 91 kg (200 lb 9.9 oz)     Physical Exam  General: Awake, alert, in no acute distress   HEENT: Atraumatic   EOM normal   External ears normal   Trachea midline  Neck: Supple, normal ROM  CV: Regular rate, regular rhythm   No lower extremity edema  2+ radial and DP pulses  PULM: Breath sounds normal bilaterally  No wheezes or rales  ABD: Soft, mild left lower quadrant tenderness on deep palpation, non-distended  Normal bowel sounds   No rebound or guarding   MSK: No gross deformities  NEURO: Alert, no focal deficits  Skin: Warm, dry and " intact      Diagnostics   Lab Results   Labs Ordered and Resulted from Time of ED Arrival to Time of ED Departure   BASIC METABOLIC PANEL - Abnormal       Result Value    Sodium 139      Potassium 3.9      Chloride 105      Carbon Dioxide (CO2) 26      Anion Gap 8      Urea Nitrogen 10.8      Creatinine 0.59      GFR Estimate >90      Calcium 8.7 (*)     Glucose 97     ROUTINE UA WITH MICROSCOPIC REFLEX TO CULTURE - Abnormal    Color Urine Light Yellow      Appearance Urine Clear      Glucose Urine Negative      Bilirubin Urine Negative      Ketones Urine Negative      Specific Gravity Urine 1.024      Blood Urine Negative      pH Urine 6.5      Protein Albumin Urine Negative      Urobilinogen Urine Normal      Nitrite Urine Negative      Leukocyte Esterase Urine Negative      Mucus Urine Present (*)     RBC Urine 1      WBC Urine 1      Squamous Epithelials Urine 10 (*)    CBC WITH PLATELETS AND DIFFERENTIAL    WBC Count 7.3      RBC Count 4.04      Hemoglobin 12.4      Hematocrit 37.0      MCV 92      MCH 30.7      MCHC 33.5      RDW 12.3      Platelet Count 209      % Neutrophils 61      % Lymphocytes 28      % Monocytes 6      % Eosinophils 4      % Basophils 1      % Immature Granulocytes 0      NRBCs per 100 WBC 0      Absolute Neutrophils 4.5      Absolute Lymphocytes 2.0      Absolute Monocytes 0.5      Absolute Eosinophils 0.3      Absolute Basophils 0.1      Absolute Immature Granulocytes 0.0      Absolute NRBCs 0.0       Imaging  CT Abdomen Pelvis w Contrast   Final Result   IMPRESSION:       1.  No acute abnormality or specific cause of the patient's symptoms are identified.      2.  Distal colonic diverticulosis. No inflamed diverticuli.        Report per radiology    Independent Interpretation  None  ED Course    Medications Administered  Medications   iopamidol (ISOVUE-370) solution 120 mL (100 mLs Intravenous $Given 4/14/25 1943)   sodium chloride 0.9 % bag for CT scan flush (65 mLs Intravenous  $Given 25)     Procedures  Procedures     Discussion of Management  None    Additional Documentation  None    ED Course  ED Course as of 04/15/25 0109    I obtained history and examined the patient as noted above.     I rechecked the patient and explained findings. I prepared the patient to be discharged home.      Medical Decision Making / Diagnosis   CMS Diagnoses: None    MIPS     None    MDM  Anne Costello is a 40 year old female presenting to the ED for evaluation of above history.  Consider broad differential.  Agree less likely to be ovarian torsion intermittently with only mild tenderness reproduced on exam today.  Do wonder whether she could have significant adhesions given at least 3 insults intra-abdominally (, perforated diverticulitis, partial colectomy).     Labs as above are reassuring.  She does have incidental findings on her CAT scan including diverticulitis without evidence of infection.  She does have small ovarian cysts, again very low suspicion for ovarian torsion even intermittently.  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  In further discussion with patient, she often is very stiff in the morning, has various joints that are swollen, question of ankylosing spondylitis or other autoimmune bony condition resulting in pain.  Given 1 year of pain, feel this would warrant further evaluation started initially with her primary care doctor.  Cannot definitively delineate the cause of her pain today though without evidence of small bowel obstruction, incarcerated hernia's, recurrent diverticulitis, feel she is safe for discharge home.    Discharged home in stable condition, patient understands all findings.  All questions answered.      Disposition  The patient was discharged.     ICD-10 Codes:    ICD-10-CM    1. Abdominal pain, left lower quadrant  R10.32       2. 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 Diverticulosis of large intestine without hemorrhage  K57.30       4. Cyst of left ovary  N83.202          Discharge Medications  Discharge Medication List as of 4/14/2025  9:40 PM        Scribe Disclosure:  I, Magdalnea Lund, am serving as a scribe at 6:56 PM on 4/14/2025 to document services personally performed by Monserrat Beaver DO based on my observations and the provider's statements to me.      Monserrat Beaver DO  04/15/25 0109

## 2025-04-14 NOTE — ED TRIAGE NOTES
"      LLQ abdominal pain for the past year since her colon resection. Patient reports that her primary MD told her to \"give it a year\" and then seek treatment for it. Patient reports that her pain was more severe last night. Denies constipation, diarrhea, nausea, vomiting.   "

## 2025-04-15 NOTE — DISCHARGE INSTRUCTIONS
You have a bony finding that might be associated with ankylosing spondylitis or another orthopedic problem. Please ask your primary care doctor to follow up on this result -- it may need more testing or evaluation. Follow up with your surgeon -- it's possible your pain is from adhesions. If any severe worsening pain, fevers, blood in your stools please come back to the ER.  
Overweight

## 2025-05-05 ENCOUNTER — TRANSCRIBE ORDERS (OUTPATIENT)
Dept: OTHER | Age: 40
End: 2025-05-05

## 2025-05-05 ENCOUNTER — MEDICAL CORRESPONDENCE (OUTPATIENT)
Dept: HEALTH INFORMATION MANAGEMENT | Facility: CLINIC | Age: 40
End: 2025-05-05
Payer: COMMERCIAL

## 2025-05-05 DIAGNOSIS — R10.9 ABDOMINAL PAIN: Primary | ICD-10-CM

## 2025-05-16 ENCOUNTER — TELEPHONE (OUTPATIENT)
Dept: GASTROENTEROLOGY | Facility: CLINIC | Age: 40
End: 2025-05-16
Payer: COMMERCIAL

## 2025-05-16 NOTE — TELEPHONE ENCOUNTER
Pre visit planning completed.      Procedure details:    Patient scheduled for Flexible sigmoidoscopy on 05.27.2025.     Arrival time: 0850. Procedure time 0935    Facility location: [facility]; [address] [place] [address]. Check in location: Main entrance, door #1 on the North side of the building under roundabout awning. DO NOT GO TO SURGERY/ED ENTRANCE.     Sedation type: Conscious sedation     Pre op exam needed? No.    Indication for procedure: Abdominal pain       Chart review:     Electronic implanted devices? No    Recent diagnosis of diverticulitis within the last 6 weeks? No      Medication review:    Diabetic? No    Anticoagulants? No    Weight loss medication/injectable? No GLP-1 medication per patient's medication list. Nursing to verify with pre-assessment call.    Other medication HOLDING recommendations:  N/A      Prep for procedure:     Bowel prep recommendation: Enemas  Due to: standard bowel prep    Due to reduction in mailed letters prep instructions were not mailed. How do they want to receive prep instructions? Patient can go to [email or web address] website and select their correct prep, they can receive via email, or eligible for secure [employer] link via email or text.    Patients Kiddies Smilzhart status is pending.       Jenna Wade RN  Endoscopy Procedure Pre Assessment   [phone] option 3

## 2025-05-19 NOTE — TELEPHONE ENCOUNTER
Pre assessment completed for upcoming procedure.   (Please see previous telephone encounter notes for complete details)          Procedure details:    Arrival time and facility location reviewed.    Pre op exam needed? No.    Designated  policy reviewed. Instructed to have someone stay 6  hours post procedure.       Medication review:    Medications reviewed. Please see supporting documentation below. Holding recommendations discussed (if applicable).       Prep for procedure:     Procedure prep instructions reviewed.        Any additional information needed:  N/A      Patient verbalized understanding and had no questions or concerns at this time.      Rachel Mckoy LPN  Endoscopy Procedure Pre Assessment   [phone] option 3

## 2025-05-27 ENCOUNTER — HOSPITAL ENCOUNTER (OUTPATIENT)
Facility: CLINIC | Age: 40
Discharge: HOME | End: 2025-05-27
Attending: COLON & RECTAL SURGERY | Admitting: COLON & RECTAL SURGERY
Payer: COMMERCIAL

## 2025-05-27 VITALS
HEIGHT: 68 IN | RESPIRATION RATE: 12 BRPM | BODY MASS INDEX: 28.79 KG/M2 | OXYGEN SATURATION: 98 % | WEIGHT: 190 LBS | SYSTOLIC BLOOD PRESSURE: 105 MMHG | HEART RATE: 64 BPM | DIASTOLIC BLOOD PRESSURE: 70 MMHG

## 2025-05-27 DIAGNOSIS — K40.90 UNILATERAL INGUINAL HERNIA WITHOUT OBSTRUCTION OR GANGRENE, RECURRENCE NOT SPECIFIED: Primary | ICD-10-CM

## 2025-05-27 LAB — FLEXIBLE SIGMOIDOSCOPY: NORMAL

## 2025-05-27 PROCEDURE — 999N000099 HC STATISTIC MODERATE SEDATION < 10 MIN: Performed by: COLON & RECTAL SURGERY

## 2025-05-27 PROCEDURE — 250N000011 HC RX IP 250 OP 636: Performed by: COLON & RECTAL SURGERY

## 2025-05-27 PROCEDURE — 45330 DIAGNOSTIC SIGMOIDOSCOPY: CPT | Performed by: COLON & RECTAL SURGERY

## 2025-05-27 RX ORDER — NALOXONE HYDROCHLORIDE 0.4 MG/ML
0.4 INJECTION, SOLUTION INTRAMUSCULAR; INTRAVENOUS; SUBCUTANEOUS
Status: DISCONTINUED | OUTPATIENT
Start: 2025-05-27 | End: 2025-05-27 | Stop reason: HOSPADM

## 2025-05-27 RX ORDER — EPINEPHRINE 1 MG/ML
0.1 INJECTION, SOLUTION, CONCENTRATE INTRAVENOUS
Status: DISCONTINUED | OUTPATIENT
Start: 2025-05-27 | End: 2025-05-27 | Stop reason: HOSPADM

## 2025-05-27 RX ORDER — ATROPINE SULFATE 0.1 MG/ML
1 INJECTION INTRAVENOUS
Status: DISCONTINUED | OUTPATIENT
Start: 2025-05-27 | End: 2025-05-27 | Stop reason: HOSPADM

## 2025-05-27 RX ORDER — DIPHENHYDRAMINE HYDROCHLORIDE 50 MG/ML
25-50 INJECTION, SOLUTION INTRAMUSCULAR; INTRAVENOUS
Status: DISCONTINUED | OUTPATIENT
Start: 2025-05-27 | End: 2025-05-27 | Stop reason: HOSPADM

## 2025-05-27 RX ORDER — ONDANSETRON 2 MG/ML
4 INJECTION INTRAMUSCULAR; INTRAVENOUS EVERY 6 HOURS PRN
Status: DISCONTINUED | OUTPATIENT
Start: 2025-05-27 | End: 2025-05-27 | Stop reason: HOSPADM

## 2025-05-27 RX ORDER — LIDOCAINE 40 MG/G
CREAM TOPICAL
Status: DISCONTINUED | OUTPATIENT
Start: 2025-05-27 | End: 2025-05-27 | Stop reason: HOSPADM

## 2025-05-27 RX ORDER — ONDANSETRON 4 MG/1
4 TABLET, ORALLY DISINTEGRATING ORAL EVERY 6 HOURS PRN
Status: DISCONTINUED | OUTPATIENT
Start: 2025-05-27 | End: 2025-05-27 | Stop reason: HOSPADM

## 2025-05-27 RX ORDER — FENTANYL CITRATE 50 UG/ML
25-100 INJECTION, SOLUTION INTRAMUSCULAR; INTRAVENOUS EVERY 5 MIN PRN
Refills: 0 | Status: DISCONTINUED | OUTPATIENT
Start: 2025-05-27 | End: 2025-05-27 | Stop reason: HOSPADM

## 2025-05-27 RX ORDER — NALOXONE HYDROCHLORIDE 0.4 MG/ML
0.2 INJECTION, SOLUTION INTRAMUSCULAR; INTRAVENOUS; SUBCUTANEOUS
Status: DISCONTINUED | OUTPATIENT
Start: 2025-05-27 | End: 2025-05-27 | Stop reason: HOSPADM

## 2025-05-27 RX ORDER — FLUMAZENIL 0.1 MG/ML
0.2 INJECTION, SOLUTION INTRAVENOUS
Status: DISCONTINUED | OUTPATIENT
Start: 2025-05-27 | End: 2025-05-27 | Stop reason: HOSPADM

## 2025-05-27 RX ORDER — PROCHLORPERAZINE MALEATE 10 MG
10 TABLET ORAL EVERY 6 HOURS PRN
Status: DISCONTINUED | OUTPATIENT
Start: 2025-05-27 | End: 2025-05-27 | Stop reason: HOSPADM

## 2025-05-27 RX ORDER — SIMETHICONE 40MG/0.6ML
133 SUSPENSION, DROPS(FINAL DOSAGE FORM)(ML) ORAL
Status: DISCONTINUED | OUTPATIENT
Start: 2025-05-27 | End: 2025-05-27 | Stop reason: HOSPADM

## 2025-05-27 RX ORDER — ONDANSETRON 2 MG/ML
4 INJECTION INTRAMUSCULAR; INTRAVENOUS
Status: DISCONTINUED | OUTPATIENT
Start: 2025-05-27 | End: 2025-05-27 | Stop reason: HOSPADM

## 2025-05-27 RX ADMIN — MIDAZOLAM 2 MG: 1 INJECTION INTRAMUSCULAR; INTRAVENOUS at 09:52

## 2025-05-27 RX ADMIN — FENTANYL CITRATE 100 MCG: 50 INJECTION, SOLUTION INTRAMUSCULAR; INTRAVENOUS at 09:52

## 2025-05-27 ASSESSMENT — ACTIVITIES OF DAILY LIVING (ADL)
ADLS_ACUITY_SCORE: 48
ADLS_ACUITY_SCORE: 48

## 2025-05-27 NOTE — H&P
Pre-Endoscopy History and Physical     Anne Costello MRN# [id number]   YOB: 1985 Age: 40 year old     Date of Procedure: 5/27/2025  Primary care provider: Clinic, [facility] [place]  Type of Endoscopy: Flexible Sigmoidoscopy  Reason for Procedure: abdominal pain  Type of Anesthesia Anticipated: Moderate Sedation    HPI:    Anne is a 40 year old female who will be undergoing the above procedure.      A history and physical has been performed. The patient's medications and allergies have been reviewed. The risks and benefits of the procedure and the sedation options and risks were discussed with the patient.  All questions were answered and informed consent was obtained.      She denies a personal or family history of anesthesia complications or bleeding disorders.     No Known Allergies     Current Facility-Administered Medications   Medication Dose Route Frequency Provider Last Rate Last Admin    atropine injection 1 mg  1 mg Intravenous Once PRN Mari Lima MD        benzocaine 20% (HURRICAINE/TOPEX) 20 % spray 0.5 mL  1 spray Mouth/Throat Once PRN Mari Lima MD        diphenhydrAMINE (BENADRYL) injection 25-50 mg  25-50 mg Intravenous Once PRN Mari Lima MD        EPINEPHrine PF (ADRENALIN) injection 0.1 mg  0.1 mg Submucosal Once PRN Mari Lima MD        fentaNYL (PF) (SUBLIMAZE) injection  mcg   mcg Intravenous Q5 Min PRN Mari Lima MD        flumazenil (ROMAZICON) injection 0.2 mg  0.2 mg Intravenous q1 min prn Mari Lima MD        glucagon injection 0.5 mg  0.5 mg Intravenous Once PRN Mari Lima MD        midazolam (VERSED) injection 0.5-2 mg  0.5-2 mg Intravenous Q4 Min PRN Mari Lima MD        naloxone (NARCAN) injection 0.2 mg  0.2 mg Intravenous Q2 Min PRN Mari Lima MD        Or    naloxone (NARCAN) injection 0.4 mg  0.4 mg Intravenous Q2 Min PRN Mari Lima MD         Or    naloxone (NARCAN) injection 0.2 mg  0.2 mg Intramuscular Q2 Min PRN Mari Lima MD        Or    naloxone (NARCAN) injection 0.4 mg  0.4 mg Intramuscular Q2 Min PRN Mari Lima MD        simethicone (MYLICON) suspension 133 mg  133 mg Oral Once PRN Mari Lima MD        sodium chloride (PF) 0.9% PF flush 3 mL  3 mL Intravenous q1 min prn Mari Lima MD        sodium chloride 0.9% BOLUS 500 mL  500 mL Intravenous Once PRN Mari Lima MD           Patient Active Problem List   Diagnosis    Supervision of normal pregnancy    Pyelectasis of fetus on prenatal ultrasound    Prenatal care in third trimester    Family history of galactosemia    S/P     Perforated diverticulum    Diverticulitis        Past Medical History:   Diagnosis Date    Diverticulosis     Hiatal hernia     History of dysplastic nevus     Multiple pigmented nevi         Past Surgical History:   Procedure Laterality Date     SECTION N/A 2018    Procedure:  SECTION;   Section;  Surgeon: Roney Paez MD;  Location: [facility] OR    COLECTOMY, SIGMOID, ROBOT-ASSISTED, LAPAROSCOPIC, USING DA DELBERT XI N/A 2024    Procedure: robotic sigmoid colectomy;  Surgeon: Mari Lima MD;  Location:  OR    COLONOSCOPY N/A 2024    Procedure: Colonoscopy;  Surgeon: Mari Lima MD;  Location:  GI    Nephrolithiasis  2023    Perforated diverticulum  2024    TONSILLECTOMY & ADENOIDECTOMY         Social History     Tobacco Use    Smoking status: Former     Current packs/day: 0.00     Average packs/day: 1 pack/day for 15.0 years (15.0 ttl pk-yrs)     Types: Cigarettes     Start date:      Quit date: 2016     Years since quittin.4    Smokeless tobacco: Never   Substance Use Topics    Alcohol use: Yes     Comment: rare       Family History   Problem Relation Age of Onset    Colon Cancer Maternal Uncle 69       REVIEW OF SYSTEMS:  "    5 point ROS negative except as noted above in HPI, including Gen., Resp., CV, GI &  system review.      PHYSICAL EXAM:   /70   Pulse 68   Resp 22   Ht 1.727 m (5' 8\")   Wt 86.2 kg (190 lb)   SpO2 94%   BMI 28.89 kg/m   Estimated body mass index is 28.89 kg/m  as calculated from the following:    Height as of this encounter: 1.727 m (5' 8\").    Weight as of this encounter: 86.2 kg (190 lb).   GENERAL APPEARANCE: healthy and alert  MENTAL STATUS: alert  AIRWAY EXAM: Mallampatti Class I (visualization of the soft palate, fauces, uvula, anterior and posterior pillars)  RESP: lungs clear to auscultation - no rales, rhonchi or wheezes  CV: regular rates and rhythm      IMPRESSION   ASA Class 2 - Mild systemic disease        PLAN:     Plan for colonoscopy. We discussed the risks, benefits and alternatives and the patient wished to proceed.    The above has been forwarded to the consulting provider.      Carolyn Lima MD  [facility]  Phone: [phone]  Fax: [phone]  May 27, 2025    "

## 2025-06-16 RX ORDER — AMOXICILLIN 500 MG/1
500 TABLET, FILM COATED ORAL
COMMUNITY
Start: 2025-06-04

## 2025-06-17 ENCOUNTER — ANESTHESIA EVENT (OUTPATIENT)
Dept: SURGERY | Facility: CLINIC | Age: 40
End: 2025-06-17
Payer: COMMERCIAL

## 2025-06-17 ENCOUNTER — HOSPITAL ENCOUNTER (OUTPATIENT)
Facility: CLINIC | Age: 40
Discharge: HOME | End: 2025-06-17
Attending: SURGERY | Admitting: SURGERY
Payer: COMMERCIAL

## 2025-06-17 ENCOUNTER — ANESTHESIA (OUTPATIENT)
Dept: SURGERY | Facility: CLINIC | Age: 40
End: 2025-06-17
Payer: COMMERCIAL

## 2025-06-17 VITALS
DIASTOLIC BLOOD PRESSURE: 60 MMHG | OXYGEN SATURATION: 99 % | BODY MASS INDEX: 29.25 KG/M2 | HEART RATE: 53 BPM | TEMPERATURE: 97 F | RESPIRATION RATE: 16 BRPM | WEIGHT: 192.4 LBS | SYSTOLIC BLOOD PRESSURE: 112 MMHG

## 2025-06-17 DIAGNOSIS — K43.9 SPIGELIAN HERNIA: Primary | ICD-10-CM

## 2025-06-17 DIAGNOSIS — K42.9 UMBILICAL HERNIA WITHOUT OBSTRUCTION AND WITHOUT GANGRENE: ICD-10-CM

## 2025-06-17 DIAGNOSIS — K40.90 UNILATERAL INGUINAL HERNIA WITHOUT OBSTRUCTION OR GANGRENE, RECURRENCE NOT SPECIFIED: ICD-10-CM

## 2025-06-17 PROCEDURE — 258N000003 HC RX IP 258 OP 636: Performed by: ANESTHESIOLOGY

## 2025-06-17 PROCEDURE — 258N000003 HC RX IP 258 OP 636: Performed by: NURSE ANESTHETIST, CERTIFIED REGISTERED

## 2025-06-17 PROCEDURE — 250N000011 HC RX IP 250 OP 636: Performed by: SURGERY

## 2025-06-17 PROCEDURE — 250N000025 HC SEVOFLURANE, PER MIN: Performed by: SURGERY

## 2025-06-17 PROCEDURE — 272N000001 HC OR GENERAL SUPPLY STERILE: Performed by: SURGERY

## 2025-06-17 PROCEDURE — 250N000013 HC RX MED GY IP 250 OP 250 PS 637: Performed by: SURGERY

## 2025-06-17 PROCEDURE — 250N000009 HC RX 250: Performed by: SURGERY

## 2025-06-17 PROCEDURE — C1781 MESH (IMPLANTABLE): HCPCS | Performed by: SURGERY

## 2025-06-17 PROCEDURE — 999N000141 HC STATISTIC PRE-PROCEDURE NURSING ASSESSMENT: Performed by: SURGERY

## 2025-06-17 PROCEDURE — 250N000013 HC RX MED GY IP 250 OP 250 PS 637: Performed by: ANESTHESIOLOGY

## 2025-06-17 PROCEDURE — 370N000017 HC ANESTHESIA TECHNICAL FEE, PER MIN: Performed by: SURGERY

## 2025-06-17 PROCEDURE — 250N000009 HC RX 250: Performed by: NURSE ANESTHETIST, CERTIFIED REGISTERED

## 2025-06-17 PROCEDURE — 250N000011 HC RX IP 250 OP 636: Mod: JZ | Performed by: SURGERY

## 2025-06-17 PROCEDURE — 710N000009 HC RECOVERY PHASE 1, LEVEL 1, PER MIN: Performed by: SURGERY

## 2025-06-17 PROCEDURE — 49593 RPR AA HRN 1ST 3-10 RDC: CPT | Performed by: SURGERY

## 2025-06-17 PROCEDURE — 250N000011 HC RX IP 250 OP 636: Performed by: ANESTHESIOLOGY

## 2025-06-17 PROCEDURE — 49593 RPR AA HRN 1ST 3-10 RDC: CPT | Mod: AS | Performed by: PHYSICIAN ASSISTANT

## 2025-06-17 PROCEDURE — 710N000012 HC RECOVERY PHASE 2, PER MINUTE: Performed by: SURGERY

## 2025-06-17 PROCEDURE — 360N000080 HC SURGERY LEVEL 7, PER MIN: Performed by: SURGERY

## 2025-06-17 PROCEDURE — 250N000011 HC RX IP 250 OP 636: Performed by: NURSE ANESTHETIST, CERTIFIED REGISTERED

## 2025-06-17 DEVICE — MESH PROGRIP LAPAROSCOPIC 5.9X3.9" PARIETEX SELF-FIX LPG1510: Type: IMPLANTABLE DEVICE | Site: INGUINAL | Status: FUNCTIONAL

## 2025-06-17 DEVICE — MESH VENTRALEX HERNIA 1.7" CIRCLE SM W/STRAP 5950007: Type: IMPLANTABLE DEVICE | Site: ABDOMEN | Status: FUNCTIONAL

## 2025-06-17 RX ORDER — ACETAMINOPHEN 325 MG/1
975 TABLET ORAL ONCE
Status: COMPLETED | OUTPATIENT
Start: 2025-06-17 | End: 2025-06-17

## 2025-06-17 RX ORDER — CEFAZOLIN SODIUM/WATER 2 G/20 ML
2 SYRINGE (ML) INTRAVENOUS
Status: COMPLETED | OUTPATIENT
Start: 2025-06-17 | End: 2025-06-17

## 2025-06-17 RX ORDER — MAGNESIUM SULFATE HEPTAHYDRATE 40 MG/ML
2 INJECTION, SOLUTION INTRAVENOUS
Status: DISCONTINUED | OUTPATIENT
Start: 2025-06-17 | End: 2025-06-17 | Stop reason: HOSPADM

## 2025-06-17 RX ORDER — METHADONE HYDROCHLORIDE 10 MG/ML
INJECTION, SOLUTION INTRAMUSCULAR; INTRAVENOUS; SUBCUTANEOUS PRN
Status: DISCONTINUED | OUTPATIENT
Start: 2025-06-17 | End: 2025-06-17

## 2025-06-17 RX ORDER — ONDANSETRON 2 MG/ML
4 INJECTION INTRAMUSCULAR; INTRAVENOUS EVERY 30 MIN PRN
Status: DISCONTINUED | OUTPATIENT
Start: 2025-06-17 | End: 2025-06-17 | Stop reason: HOSPADM

## 2025-06-17 RX ORDER — METHADONE HYDROCHLORIDE 10 MG/ML
2 INJECTION, SOLUTION INTRAMUSCULAR; INTRAVENOUS; SUBCUTANEOUS 3 TIMES DAILY PRN
Status: DISCONTINUED | OUTPATIENT
Start: 2025-06-17 | End: 2025-06-17 | Stop reason: HOSPADM

## 2025-06-17 RX ORDER — LIDOCAINE HYDROCHLORIDE 20 MG/ML
INJECTION, SOLUTION INFILTRATION; PERINEURAL PRN
Status: DISCONTINUED | OUTPATIENT
Start: 2025-06-17 | End: 2025-06-17

## 2025-06-17 RX ORDER — HYDRALAZINE HYDROCHLORIDE 20 MG/ML
10 INJECTION INTRAMUSCULAR; INTRAVENOUS EVERY 10 MIN PRN
Status: DISCONTINUED | OUTPATIENT
Start: 2025-06-17 | End: 2025-06-17 | Stop reason: HOSPADM

## 2025-06-17 RX ORDER — OXYCODONE HYDROCHLORIDE 5 MG/1
5-10 TABLET ORAL EVERY 4 HOURS PRN
Qty: 20 TABLET | Refills: 0 | Status: SHIPPED | OUTPATIENT
Start: 2025-06-17

## 2025-06-17 RX ORDER — DEXAMETHASONE SODIUM PHOSPHATE 4 MG/ML
4 INJECTION, SOLUTION INTRA-ARTICULAR; INTRALESIONAL; INTRAMUSCULAR; INTRAVENOUS; SOFT TISSUE
Status: DISCONTINUED | OUTPATIENT
Start: 2025-06-17 | End: 2025-06-17 | Stop reason: HOSPADM

## 2025-06-17 RX ORDER — ACETAMINOPHEN 325 MG/1
650 TABLET ORAL
Status: DISCONTINUED | OUTPATIENT
Start: 2025-06-17 | End: 2025-06-17 | Stop reason: HOSPADM

## 2025-06-17 RX ORDER — NALOXONE HYDROCHLORIDE 0.4 MG/ML
0.1 INJECTION, SOLUTION INTRAMUSCULAR; INTRAVENOUS; SUBCUTANEOUS
Status: DISCONTINUED | OUTPATIENT
Start: 2025-06-17 | End: 2025-06-17 | Stop reason: HOSPADM

## 2025-06-17 RX ORDER — ONDANSETRON 2 MG/ML
INJECTION INTRAMUSCULAR; INTRAVENOUS PRN
Status: DISCONTINUED | OUTPATIENT
Start: 2025-06-17 | End: 2025-06-17

## 2025-06-17 RX ORDER — CEFAZOLIN SODIUM/WATER 2 G/20 ML
2 SYRINGE (ML) INTRAVENOUS SEE ADMIN INSTRUCTIONS
Status: DISCONTINUED | OUTPATIENT
Start: 2025-06-17 | End: 2025-06-17 | Stop reason: HOSPADM

## 2025-06-17 RX ORDER — ONDANSETRON 4 MG/1
4 TABLET, ORALLY DISINTEGRATING ORAL EVERY 30 MIN PRN
Status: DISCONTINUED | OUTPATIENT
Start: 2025-06-17 | End: 2025-06-17 | Stop reason: HOSPADM

## 2025-06-17 RX ORDER — LIDOCAINE 40 MG/G
CREAM TOPICAL
Status: DISCONTINUED | OUTPATIENT
Start: 2025-06-17 | End: 2025-06-17 | Stop reason: HOSPADM

## 2025-06-17 RX ORDER — ALBUTEROL SULFATE 0.83 MG/ML
2.5 SOLUTION RESPIRATORY (INHALATION) EVERY 4 HOURS PRN
Status: DISCONTINUED | OUTPATIENT
Start: 2025-06-17 | End: 2025-06-17 | Stop reason: HOSPADM

## 2025-06-17 RX ORDER — LABETALOL HYDROCHLORIDE 5 MG/ML
10 INJECTION, SOLUTION INTRAVENOUS
Status: DISCONTINUED | OUTPATIENT
Start: 2025-06-17 | End: 2025-06-17 | Stop reason: HOSPADM

## 2025-06-17 RX ORDER — DEXAMETHASONE SODIUM PHOSPHATE 4 MG/ML
INJECTION, SOLUTION INTRA-ARTICULAR; INTRALESIONAL; INTRAMUSCULAR; INTRAVENOUS; SOFT TISSUE PRN
Status: DISCONTINUED | OUTPATIENT
Start: 2025-06-17 | End: 2025-06-17

## 2025-06-17 RX ORDER — ACETAMINOPHEN 500 MG
1000 TABLET ORAL EVERY 6 HOURS PRN
COMMUNITY
Start: 2025-06-17

## 2025-06-17 RX ORDER — BUPIVACAINE HYDROCHLORIDE 5 MG/ML
INJECTION, SOLUTION EPIDURAL; INTRACAUDAL; PERINEURAL PRN
Status: DISCONTINUED | OUTPATIENT
Start: 2025-06-17 | End: 2025-06-17 | Stop reason: HOSPADM

## 2025-06-17 RX ORDER — SODIUM CHLORIDE, SODIUM LACTATE, POTASSIUM CHLORIDE, CALCIUM CHLORIDE 600; 310; 30; 20 MG/100ML; MG/100ML; MG/100ML; MG/100ML
INJECTION, SOLUTION INTRAVENOUS CONTINUOUS
Status: DISCONTINUED | OUTPATIENT
Start: 2025-06-17 | End: 2025-06-17 | Stop reason: HOSPADM

## 2025-06-17 RX ORDER — OXYCODONE HYDROCHLORIDE 5 MG/1
5 TABLET ORAL
Status: COMPLETED | OUTPATIENT
Start: 2025-06-17 | End: 2025-06-17

## 2025-06-17 RX ORDER — KETOROLAC TROMETHAMINE 15 MG/ML
15 INJECTION, SOLUTION INTRAMUSCULAR; INTRAVENOUS
Status: DISCONTINUED | OUTPATIENT
Start: 2025-06-17 | End: 2025-06-17 | Stop reason: HOSPADM

## 2025-06-17 RX ORDER — PROPOFOL 10 MG/ML
INJECTION, EMULSION INTRAVENOUS PRN
Status: DISCONTINUED | OUTPATIENT
Start: 2025-06-17 | End: 2025-06-17

## 2025-06-17 RX ORDER — KETOROLAC TROMETHAMINE 30 MG/ML
INJECTION, SOLUTION INTRAMUSCULAR; INTRAVENOUS PRN
Status: DISCONTINUED | OUTPATIENT
Start: 2025-06-17 | End: 2025-06-17

## 2025-06-17 RX ORDER — IBUPROFEN 200 MG
600 TABLET ORAL EVERY 6 HOURS PRN
COMMUNITY
Start: 2025-06-17

## 2025-06-17 RX ADMIN — ROCURONIUM BROMIDE 50 MG: 50 INJECTION, SOLUTION INTRAVENOUS at 10:34

## 2025-06-17 RX ADMIN — METHADONE HYDROCHLORIDE 2 MG: 10 INJECTION, SOLUTION INTRAMUSCULAR; INTRAVENOUS; SUBCUTANEOUS at 14:00

## 2025-06-17 RX ADMIN — PROPOFOL 50 MG: 10 INJECTION, EMULSION INTRAVENOUS at 11:55

## 2025-06-17 RX ADMIN — SUGAMMADEX 200 MG: 100 INJECTION, SOLUTION INTRAVENOUS at 12:22

## 2025-06-17 RX ADMIN — METHADONE HYDROCHLORIDE 10 MG: 10 INJECTION, SOLUTION INTRAMUSCULAR; INTRAVENOUS; SUBCUTANEOUS at 10:33

## 2025-06-17 RX ADMIN — DEXAMETHASONE SODIUM PHOSPHATE 8 MG: 4 INJECTION, SOLUTION INTRA-ARTICULAR; INTRALESIONAL; INTRAMUSCULAR; INTRAVENOUS; SOFT TISSUE at 10:34

## 2025-06-17 RX ADMIN — SODIUM CHLORIDE, SODIUM LACTATE, POTASSIUM CHLORIDE, AND CALCIUM CHLORIDE: .6; .31; .03; .02 INJECTION, SOLUTION INTRAVENOUS at 10:23

## 2025-06-17 RX ADMIN — ROCURONIUM BROMIDE 20 MG: 50 INJECTION, SOLUTION INTRAVENOUS at 11:14

## 2025-06-17 RX ADMIN — ONDANSETRON 4 MG: 2 INJECTION INTRAMUSCULAR; INTRAVENOUS at 10:33

## 2025-06-17 RX ADMIN — METHADONE HYDROCHLORIDE 2 MG: 10 INJECTION, SOLUTION INTRAMUSCULAR; INTRAVENOUS; SUBCUTANEOUS at 13:46

## 2025-06-17 RX ADMIN — Medication 2 G: at 10:23

## 2025-06-17 RX ADMIN — PROPOFOL 50 MG: 10 INJECTION, EMULSION INTRAVENOUS at 12:27

## 2025-06-17 RX ADMIN — LIDOCAINE HYDROCHLORIDE 50 MG: 20 INJECTION, SOLUTION INFILTRATION; PERINEURAL at 10:33

## 2025-06-17 RX ADMIN — ONDANSETRON 4 MG: 2 INJECTION, SOLUTION INTRAMUSCULAR; INTRAVENOUS at 14:33

## 2025-06-17 RX ADMIN — OXYCODONE HYDROCHLORIDE 5 MG: 5 TABLET ORAL at 13:53

## 2025-06-17 RX ADMIN — KETOROLAC TROMETHAMINE 15 MG: 30 INJECTION, SOLUTION INTRAMUSCULAR at 10:33

## 2025-06-17 RX ADMIN — DEXMEDETOMIDINE HYDROCHLORIDE 0.5 MCG/KG/HR: 100 INJECTION, SOLUTION INTRAVENOUS at 10:35

## 2025-06-17 RX ADMIN — SODIUM CHLORIDE, SODIUM LACTATE, POTASSIUM CHLORIDE, AND CALCIUM CHLORIDE: .6; .31; .03; .02 INJECTION, SOLUTION INTRAVENOUS at 09:39

## 2025-06-17 RX ADMIN — MIDAZOLAM 2 MG: 1 INJECTION INTRAMUSCULAR; INTRAVENOUS at 10:26

## 2025-06-17 RX ADMIN — ACETAMINOPHEN 975 MG: 325 TABLET ORAL at 09:42

## 2025-06-17 RX ADMIN — PROPOFOL 200 MG: 10 INJECTION, EMULSION INTRAVENOUS at 10:34

## 2025-06-17 ASSESSMENT — ACTIVITIES OF DAILY LIVING (ADL)
ADLS_ACUITY_SCORE: 22

## 2025-06-17 NOTE — ANESTHESIA PROCEDURE NOTES
Airway       Patient location during procedure: OR       Procedure Start/Stop Times: 6/17/2025 10:36 AM  Staff -        CRNA: Nimco Constantino APRN CRNA       Performed By: CRNA  Consent for Airway        Urgency: elective  Indications and Patient Condition       Indications for airway management: jeffy-procedural       Induction type:intravenous       Mask difficulty assessment: 1 - vent by mask    Final Airway Details       Final airway type: endotracheal airway       Successful airway: ETT - single and Oral  Endotracheal Airway Details        ETT size (mm): 7.0       Cuffed: yes       Successful intubation technique: direct laryngoscopy       DL Blade Type: Faulkner 2       Grade View of Cords: 1       Adjucts: stylet       Position: Right       Measured from: lips       Secured at (cm): 22       Bite block used: None    Post intubation assessment        Placement verified by: capnometry, equal breath sounds and chest rise        Number of attempts at approach: 1       Number of other approaches attempted: 0       Secured with: tape       Ease of procedure: easy       Dentition: Unchanged    Medication(s) Administered   Medication Administration Time: 6/17/2025 10:36 AM

## 2025-06-17 NOTE — DISCHARGE INSTRUCTIONS
Maximum acetaminophen (Tylenol) dose from all sources should not exceed 4 grams (4000 mg) per day.  You last had 975 mg at 9:45am; do not take Tylenol products again until after 3:45pm if needed.     Maximum ibuprofen (Advil) dose from all sources should not exceed 2.5 grams (2,400 mg) per day. You received Toradol, an IV form of Ibuprofen (Motrin) at 10:30am.  Do not take any Ibuprofen products until 4:30pm or after.      Dr. Santacruz  [facility] [phone]

## 2025-06-17 NOTE — ANESTHESIA PREPROCEDURE EVALUATION
Anesthesia Pre-Procedure Evaluation    Patient: Anne Costello   MRN: [id number] : 1985          Procedure : Procedure(s):  ROBOTIC ASSISTED LEFT INGUINAL HERNIA REPAIR,  POSSIBLE RIGHT INGUINAL HERNIA REPAIR,  OPEN REPAIR UMBILICAL HERNIA         Past Medical History:   Diagnosis Date    Diverticulosis     Hiatal hernia     History of dysplastic nevus     Multiple pigmented nevi       Past Surgical History:   Procedure Laterality Date     SECTION N/A 2018    Procedure:  SECTION;   Section;  Surgeon: Roney Paez MD;  Location: [facility] OR    COLECTOMY, SIGMOID, ROBOT-ASSISTED, LAPAROSCOPIC, USING DA DELBERT XI N/A 2024    Procedure: robotic sigmoid colectomy;  Surgeon: Mari Lima MD;  Location:  OR    COLONOSCOPY N/A 2024    Procedure: Colonoscopy;  Surgeon: Mari Lima MD;  Location:  GI    Nephrolithiasis  2023    Perforated diverticulum  2024    SIGMOIDOSCOPY FLEXIBLE N/A 2025    Procedure: Sigmoidoscopy flexible;  Surgeon: Mari Lima MD;  Location:  GI    TONSILLECTOMY & ADENOIDECTOMY        No Known Allergies   Social History     Tobacco Use    Smoking status: Former     Current packs/day: 0.00     Average packs/day: 1 pack/day for 15.0 years (15.0 ttl pk-yrs)     Types: Cigarettes     Start date:      Quit date: 2016     Years since quittin.4    Smokeless tobacco: Never   Substance Use Topics    Alcohol use: Yes     Comment: rare      Wt Readings from Last 1 Encounters:   25 87.3 kg (192 lb 6.4 oz)        Anesthesia Evaluation            ROS/MED HX  ENT/Pulmonary:  - neg pulmonary ROS     Neurologic:  - neg neurologic ROS     Cardiovascular:  - neg cardiovascular ROS     METS/Exercise Tolerance:     Hematologic:  - neg hematologic  ROS     Musculoskeletal:   (+)  arthritis,             GI/Hepatic:     (+)      hiatal hernia,              Renal/Genitourinary:  - neg Renal ROS     Endo:     (+)     "           Obesity,       Psychiatric/Substance Use:  - neg psychiatric ROS     Infectious Disease:       Malignancy:       Other:              Physical Exam  Airway  Mallampati: II  TM distance: >3 FB  Neck ROM: full  Mouth opening: >= 4 cm    Cardiovascular   Rhythm: regular  Rate: normal rate     Dental   (+) Minor Abnormalities - some fillings, tiny chips      Pulmonary - normal exam      Neurological - normal exam  She appears awake, alert and oriented x3.    Other Findings       OUTSIDE LABS:  CBC:   Lab Results   Component Value Date    WBC 7.3 04/14/2025    WBC 7.1 06/29/2024    HGB 12.4 04/14/2025    HGB 13.2 06/29/2024    HCT 37.0 04/14/2025    HCT 41.0 06/29/2024     04/14/2025     06/29/2024     BMP:   Lab Results   Component Value Date     04/14/2025     06/29/2024    POTASSIUM 3.9 04/14/2025    POTASSIUM 4.1 06/29/2024    CHLORIDE 105 04/14/2025    CHLORIDE 102 06/29/2024    CO2 26 04/14/2025    CO2 23 06/29/2024    BUN 10.8 04/14/2025    BUN 13.2 06/29/2024    CR 0.59 04/14/2025    CR 0.64 06/29/2024    GLC 97 04/14/2025    GLC 77 06/29/2024     COAGS: No results found for: \"PTT\", \"INR\", \"FIBR\"  POC:   Lab Results   Component Value Date    HCG Negative 06/29/2024     HEPATIC:   Lab Results   Component Value Date    ALBUMIN 4.2 06/29/2024    PROTTOTAL 6.5 06/29/2024    ALT 20 06/29/2024    AST 18 06/29/2024    ALKPHOS 60 06/29/2024    BILITOTAL 0.2 06/29/2024     OTHER:   Lab Results   Component Value Date    LACT 1.2 02/27/2024    TJ 8.7 (L) 04/14/2025    PHOS 3.0 04/20/2024    MAG 1.8 04/20/2024    LIPASE 21 06/29/2024       Anesthesia Plan    ASA Status:  2      NPO Status: NPO Appropriate   Anesthesia Type: General.  Airway: oral.  Induction: intravenous.  Maintenance: Balanced.   Techniques and Equipment:       - Monitoring Plan: standard ASA monitoring     Consents    Anesthesia Plan(s) and associated risks, benefits, and realistic alternatives discussed. Questions " "answered and patient/representative(s) expressed understanding.     - Discussed: anesthesiologist     - Discussed with:  Patient        - Pt is DNR/DNI Status: no DNR     Blood Consent:      - Discussed with: not discussed.     Postoperative Care    Pain management: non-narcotic analgesics, plan for postoperative opioid use, multimodal analgesia.     Comments:    Other Comments:   Methadone 10 mg  Decadron 8 mg  Zofran 4 mg  Toradol 15 mg  Precedex  Sugammadex if paralytic to be used                Rah Castillo MD        Clinically Significant Risk Factors Present on Admission                             # Overweight: Estimated body mass index is 29.25 kg/m  as calculated from the following:    Height as of 5/30/25: 1.727 m (5' 8\").    Weight as of this encounter: 87.3 kg (192 lb 6.4 oz).                    "

## 2025-06-17 NOTE — ANESTHESIA POSTPROCEDURE EVALUATION
Patient: Anne Costello    Procedure: Procedure(s):  ROBOTIC ASSISTED LEFT SPIGELIAN HERNIA REPAIR,  OPEN REPAIR UMBILICAL HERNIA       Anesthesia Type:  General    Note:  Disposition: Outpatient   Postop Pain Control: Uneventful            Sign Out: Well controlled pain   PONV: No   Neuro/Psych: Uneventful            Sign Out: Acceptable/Baseline neuro status   Airway/Respiratory: Uneventful            Sign Out: Acceptable/Baseline resp. status   CV/Hemodynamics: Uneventful            Sign Out: Acceptable CV status   Other NRE: NONE   DID A NON-ROUTINE EVENT OCCUR? No           Last vitals:  Vitals Value Taken Time   /61 06/17/25 14:30   Temp 97.88  F (36.6  C) 06/17/25 14:36   Pulse 61 06/17/25 14:36   Resp 13 06/17/25 14:36   SpO2 96 % 06/17/25 14:35   Vitals shown include unfiled device data.    Electronically Signed By: Rah Castillo MD  June 17, 2025  2:59 PM

## 2025-06-17 NOTE — OP NOTE
[facility] General Surgery Operative Note    Pre-operative diagnosis: Left Inguinal hernia, umbilical hernia   Post-operative diagnosis: Left spigelian hernia, umbilical hernia   Procedure: Robotic assisted laparoscopic left spigelian hernia repair, open umbilical hernia repair   Surgeon: Annette Santacruz MD   Assistant(s): Jermain Ramos PA-C  The Physician Assistant was medically necessary for their expertise in prepping, camera management, suctioning, suturing and retraction.   Anesthesia: general   Estimated blood loss:  Specimen:  FINDINGS:  25 ml  None  Left spigelian hernia at the level of the ASIS, 1 cm umbilical hernia containing pre-peritoneal fat.       Indication for Procedure: This is a 40 year old female who presented to the office with a symptomatic left lower abdominal hernia. On exam, it was unclear whether this represented an inguinal hernia versus a Spigelian hernia. On CT, there is a Spigelian hernia visible that is not reported by the radiologist. After discussing risks and benefits, she agreed to proceed with robotic repair. She also has a small umbilical hernia which we will fix open.   Description of procedure:  Patient was brought to the operating room, placed on the operating table in supine position. Anesthesia was induced. Her pressure points were padded and she was secured with a safety strap. A timeout was called to verify the patient, site of procedure and procedure to be performed.  A 15 mm incision 20cm above the pubis was made and carried down to the fascia. A figure of eight suture of 0 vicryl was placed in the fascia. The abdomen was entered bluntly with a carmalt clamp after making a small nick in the fascia. An 8mm port was placed and the abdomen insufflated with CO2.  Two 8mm ports were then placed on right and left lateral abdomen, 8cm from the midline port. In surveying the pelvis, there was no evidence of inguinal hernia on either side, but there was an obvious  spigelian hernia on the left.  The peritoneum was opened transversely several cm away from the superior edge of the spigelian hernia. The peritoneum was dissected away from the abdominal wall. The sac was then carefully reduced from the spigelian hernia along with a collection of preperitoneal fat. The hernia defect measured 2 cm x 3 cm. An 0 stratafix suture was used to close the defect transversely. The corners of a 10cm x 15cm piece of progrip mesh were rounded and the mesh was placed in the pocket and unfurled to lay flat.  The peritoneal flap was closed in a running fashion with a 3-0 V-lock suture. The cavity was inspected and there was adequate hemostasis. The trocars were then removed. The previously placed 0 vicryl suture in the supraumbilical fascia was tied down. 0.5% marcaine was injected along the fascia and subcutaneous tissues. The skin was closed with 4-0 suture.  Next, we turned our attention to the small umbilical hernia. After injecting 0.5% marcaine, a 3 cm curvilinear infraumbilical incision was created. The subcutaneous tissue was dissected down to the fascia with cautery. The umbilical stalk was encircled bluntly and then transected with cautery. The hernia was identified and was about 1 cm diameter. A 4.3 cm diameter circular piece of polypropylene mesh was then opened.     The fascia around the hernia was defined circumferentially.  The mesh was placed subfascial, in a preperitoneal location. Interrupted 0 vicryl was used to secure the mesh at four corners. 0 vicryl was used to close the fascia over the mesh. Local was injected along the fascia, subcutaneous tissues and skin.  The umbilicus was reinverted using a 3-0 vicryl stitch. The skin was closed with running 4-0 vicryl. Incisions were covered with dermabond.  At the end of the operation, all sponge, instrument, and needle counts were correct.     Annette Santacruz MD

## 2025-07-07 ENCOUNTER — TELEPHONE (OUTPATIENT)
Dept: SURGERY | Facility: CLINIC | Age: 40
End: 2025-07-07
Payer: COMMERCIAL

## 2025-07-07 NOTE — TELEPHONE ENCOUNTER
[facility] Postoperative Call Note:     Anne Costello was called for an update regarding her recovery. She underwent a robotic-assistedumbilical and spigelian inguinal  hernia repair with mesh by Dr. Santacruz on 6/17/25. Today she tells me she is doing well and denies any complaints. She is eating a normal diet and her bowels are regular. She states her wounds are healing well.    Patient was instructed to slowly and gradually resume all normal activities.The patient states all of her questions were answered. She understands our discussion. She agrees to follow up as needed or to call our office with any concerns.    Jermain Ramos PA-C      Please route or send letter to:  Primary Care Provider (PCP)

## (undated) DEVICE — SU VICRYL+ 3-0 27IN SH UND VCP416H

## (undated) DEVICE — LINEN FULL SHEET 5511

## (undated) DEVICE — NDL INSUFFLATION 13GA 120MM C2201

## (undated) DEVICE — SUCTION MITY VAC MUSHROOM CUP 10007LP

## (undated) DEVICE — Device

## (undated) DEVICE — SOL NACL 0.9% INJ 1000ML BAG 2B1324X

## (undated) DEVICE — SU VICRYL 0 CT-1 36" J346H

## (undated) DEVICE — SU DERMABOND ADVANCED .7ML DNX12

## (undated) DEVICE — SU STRATAFIX PDS PLUS 3-0 SPIRAL SH 15CM SXPP1B420

## (undated) DEVICE — CATH TRAY FOLEY SURESTEP 16FR W/URNE MTR STLK LATEX A303316A

## (undated) DEVICE — TUBING SUCTION 12"X1/4" N612

## (undated) DEVICE — GLOVE PROTEXIS W/NEU-THERA 7.5  2D73TE75

## (undated) DEVICE — SU PLAIN 2-0 CT-1 27" 843H

## (undated) DEVICE — DAVINCI XI SEAL UNIVERSAL 5-12MM 470500

## (undated) DEVICE — DAVINCI HOT SHEARS TIP COVER  400180

## (undated) DEVICE — PREP SKIN SCRUB TRAY 4461A

## (undated) DEVICE — SU VICRYL+ 0 27 UR6 VLT VCP603H

## (undated) DEVICE — PACK C-SECTION LF PL15OTA83B

## (undated) DEVICE — DAVINCI XI MONOPOLAR SCISSORS HOT SHEARS 8MM 470179

## (undated) DEVICE — DAVINCI XI DRAPE COLUMN 470341

## (undated) DEVICE — LINEN DRAPE 54X72" 5467

## (undated) DEVICE — ESU GROUND PAD ADULT W/CORD E7507

## (undated) DEVICE — KIT PATIENT POSITIONING PIGAZZI LATEX FREE 40580

## (undated) DEVICE — SU MONOCRYL 4-0 PS-2 18" UND Y496G

## (undated) DEVICE — BAG CLEAR TRASH 1.3M 39X33" P4040C

## (undated) DEVICE — LINEN HALF SHEET 5512

## (undated) DEVICE — SU PDS II 0 CTX 60" Z990G

## (undated) DEVICE — DAVINCI XI REDUCER 8-12MM 470381

## (undated) DEVICE — KIT ENDO TURNOVER/PROCEDURE W/CLEAN A SCOPE LINERS 103888

## (undated) DEVICE — SU STRATAFIX PDS PLUS 0 CT-2 9" SXPP1A446

## (undated) DEVICE — LINEN TOWEL PACK X5 5464

## (undated) DEVICE — SPONGE RAY-TEC 4X8" 7318

## (undated) DEVICE — LINEN BABY BLANKET 5434

## (undated) DEVICE — SUCTION CANISTER MEDIVAC LINER 3000ML W/LID 65651-530

## (undated) DEVICE — STPL DAVINCI SUREFORM 60MM STR 480460

## (undated) DEVICE — PACK DAVINCI UROLOGY SBA15UDFSG

## (undated) DEVICE — SU MONOCRYL 2-0 CT-1 36" UND Y945H

## (undated) DEVICE — ESU GROUND PAD UNIVERSAL W/O CORD

## (undated) DEVICE — SOL NACL 0.9% IRRIG 1000ML BOTTLE 2F7124

## (undated) DEVICE — GLOVE PROTEXIS BLUE W/NEU-THERA 7.5  2D73EB75

## (undated) DEVICE — PAD CHUX UNDERPAD 30X36" P3036C

## (undated) DEVICE — ANTIFOG SOLUTION SEE SHARP 150M TROCAR SWABS 30978 (COI)

## (undated) DEVICE — DAVINCI XI OBTURATOR BLADELESS 8MM 470359

## (undated) DEVICE — RULER SURGICAL PLASTIC STRL LF CS628

## (undated) DEVICE — SUCTION TIP YANKAUER W/O VENT K86

## (undated) DEVICE — CAP BABY PINK/BLUE IC-2

## (undated) DEVICE — PREP CHLORAPREP 26ML TINTED ORANGE  260815

## (undated) DEVICE — LINEN TOWEL PACK X10 5473

## (undated) DEVICE — JELLY LUBRICATING SURGILUBE 2OZ TUBE

## (undated) DEVICE — SUCTION IRR STRYKERFLOW II W/TIP 250-070-520

## (undated) DEVICE — DRSG STERI STRIP 1/2X4" R1547

## (undated) DEVICE — JELLY LUBRICATING SURGILUBE 4OZ TUBE

## (undated) DEVICE — DRAIN PENROSE 0.25"X12" LATEX FREE GR101

## (undated) DEVICE — GLOVE BIOGEL PI ULTRATOUCH SZ 6.5 41165

## (undated) DEVICE — SYR BULB IRRIG 50ML LATEX FREE 0035280

## (undated) DEVICE — STPL CIRCULAR 29MM CVD CDH29P

## (undated) DEVICE — SUCTION TIP POOLE K770

## (undated) DEVICE — TUBING CONMED AIRSEAL SMOKE EVAC INSUFFLATION ASM-EVAC

## (undated) DEVICE — SU PROLENE 2-0 SHDA 48" 8533H

## (undated) DEVICE — DAVINCI XI DRAPE ARM 470015

## (undated) DEVICE — DECANTER VIAL 2006S

## (undated) DEVICE — SOL WATER IRRIG 1000ML BOTTLE 2F7114

## (undated) DEVICE — ENDO TROCAR CONMED AIRSEAL BLADELESS 05X120MM IAS5-120LP

## (undated) DEVICE — KIT SIGMOIDOSCOPE ENDOSCOPIC LIGHTED 18FR KI613/10

## (undated) DEVICE — GLOVE BIOGEL PI MICRO INDICATOR UNDERGLOVE SZ 6.5 48965

## (undated) DEVICE — DAVINCI XI FCP BIPOLAR FENESTRATED 470205

## (undated) DEVICE — TRANSFER DEVICE BLOOD NDL HOLDER 364880

## (undated) DEVICE — PREP POVIDONE IODINE SOLUTION 10% 120ML

## (undated) DEVICE — SU VICRYL 0 TIE 12X18" J906G

## (undated) DEVICE — BLADE CLIPPER SGL USE 9680

## (undated) DEVICE — SU CHROMIC 1 CT 27" 803H

## (undated) DEVICE — DEVICE SUTURE PASSER 14GA WECK EFX EFXSP2

## (undated) DEVICE — DRAPE LAP W/ARMBOARD 29410

## (undated) DEVICE — SU VICRYL 2-0 SH 27" J317H

## (undated) DEVICE — SU VICRYL+ 4-0 UNDYED PS-2 VCP496ZH

## (undated) DEVICE — DAVINCI XI ESU FORCE BIPOLAR 8MM 471405

## (undated) DEVICE — STOCKING SLEEVE VASOPRESS COMPRESSION CALF MED 18" VP501M

## (undated) DEVICE — LINEN ORTHO ACL PACK 5447

## (undated) DEVICE — DAVINCI XI NDL DRIVER LARGE 470006

## (undated) DEVICE — CATH TRAY FOLEY SURESTEP 16FR DRAIN BAG STATOCK A899916

## (undated) DEVICE — WIPES FOLEY CARE SURESTEP PROVON DFC100

## (undated) DEVICE — DAVINCI XI HANDPIECE ESU VESSEL SEALER 8MM EXT 480422

## (undated) DEVICE — SOLUTION WATER 1000ML BOTTLE R5000-01

## (undated) DEVICE — ANTIFOG SOLUTION SEE SHARP 150M TROCAR SWABS 30978

## (undated) DEVICE — GLOVE PROTEXIS BLUE W/NEU-THERA 6.5  2D73EB65

## (undated) DEVICE — DAVINCI XI GRASPER FENESTRATED TIP UP 8MM 470347

## (undated) DEVICE — BLADE KNIFE SURG 10 371110

## (undated) DEVICE — STPL DAVINCI SUREFORM 60MM RELOAD GREEN 48360G

## (undated) DEVICE — LUBRICANT INST ELECTROLUBE EL101

## (undated) DEVICE — SUTURE VICRYL+ 0 CT-2 18" VCP727H

## (undated) DEVICE — STPL SKIN 35W APPOSE 8886803712

## (undated) DEVICE — DRSG TEGADERM 4X4 3/4" 1626W

## (undated) DEVICE — TAPE CLOTH ADHESIVE 3" LATEX FREE

## (undated) DEVICE — SPONGE TONSIL W/STRING XLG LATEX FREE

## (undated) RX ORDER — BUPIVACAINE HYDROCHLORIDE 5 MG/ML
INJECTION, SOLUTION EPIDURAL; INTRACAUDAL
Status: DISPENSED
Start: 2024-04-19

## (undated) RX ORDER — DEXAMETHASONE SODIUM PHOSPHATE 4 MG/ML
INJECTION, SOLUTION INTRA-ARTICULAR; INTRALESIONAL; INTRAMUSCULAR; INTRAVENOUS; SOFT TISSUE
Status: DISPENSED
Start: 2024-04-19

## (undated) RX ORDER — FENTANYL CITRATE 50 UG/ML
INJECTION, SOLUTION INTRAMUSCULAR; INTRAVENOUS
Status: DISPENSED
Start: 2025-05-27

## (undated) RX ORDER — DEXAMETHASONE SODIUM PHOSPHATE 4 MG/ML
INJECTION, SOLUTION INTRA-ARTICULAR; INTRALESIONAL; INTRAMUSCULAR; INTRAVENOUS; SOFT TISSUE
Status: DISPENSED
Start: 2025-06-17

## (undated) RX ORDER — SCOLOPAMINE TRANSDERMAL SYSTEM 1 MG/1
PATCH, EXTENDED RELEASE TRANSDERMAL
Status: DISPENSED
Start: 2024-04-19

## (undated) RX ORDER — MORPHINE SULFATE 1 MG/ML
INJECTION, SOLUTION EPIDURAL; INTRATHECAL; INTRAVENOUS
Status: DISPENSED
Start: 2018-03-24

## (undated) RX ORDER — KETOROLAC TROMETHAMINE 30 MG/ML
INJECTION, SOLUTION INTRAMUSCULAR; INTRAVENOUS
Status: DISPENSED
Start: 2024-04-19

## (undated) RX ORDER — DROPERIDOL 2.5 MG/ML
INJECTION, SOLUTION INTRAMUSCULAR; INTRAVENOUS
Status: DISPENSED
Start: 2024-04-19

## (undated) RX ORDER — CEFAZOLIN SODIUM/WATER 2 G/20 ML
SYRINGE (ML) INTRAVENOUS
Status: DISPENSED
Start: 2025-06-17

## (undated) RX ORDER — KETOROLAC TROMETHAMINE 30 MG/ML
INJECTION, SOLUTION INTRAMUSCULAR; INTRAVENOUS
Status: DISPENSED
Start: 2025-06-17

## (undated) RX ORDER — ONDANSETRON 2 MG/ML
INJECTION INTRAMUSCULAR; INTRAVENOUS
Status: DISPENSED
Start: 2025-06-17

## (undated) RX ORDER — METHADONE HYDROCHLORIDE 10 MG/ML
INJECTION, SOLUTION INTRAMUSCULAR; INTRAVENOUS; SUBCUTANEOUS
Status: DISPENSED
Start: 2025-06-17

## (undated) RX ORDER — OXYTOCIN/0.9 % SODIUM CHLORIDE 30/500 ML
PLASTIC BAG, INJECTION (ML) INTRAVENOUS
Status: DISPENSED
Start: 2018-03-24

## (undated) RX ORDER — DEXAMETHASONE SODIUM PHOSPHATE 4 MG/ML
INJECTION, SOLUTION INTRA-ARTICULAR; INTRALESIONAL; INTRAMUSCULAR; INTRAVENOUS; SOFT TISSUE
Status: DISPENSED
Start: 2018-03-24

## (undated) RX ORDER — HEPARIN SODIUM 5000 [USP'U]/.5ML
INJECTION, SOLUTION INTRAVENOUS; SUBCUTANEOUS
Status: DISPENSED
Start: 2024-04-19

## (undated) RX ORDER — INDOCYANINE GREEN AND WATER 25 MG
KIT INJECTION
Status: DISPENSED
Start: 2024-04-19

## (undated) RX ORDER — BUPIVACAINE HYDROCHLORIDE 5 MG/ML
INJECTION, SOLUTION EPIDURAL; INTRACAUDAL; PERINEURAL
Status: DISPENSED
Start: 2025-06-17

## (undated) RX ORDER — LIDOCAINE HYDROCHLORIDE 10 MG/ML
INJECTION, SOLUTION EPIDURAL; INFILTRATION; INTRACAUDAL; PERINEURAL
Status: DISPENSED
Start: 2025-06-17

## (undated) RX ORDER — ACETAMINOPHEN 325 MG/1
TABLET ORAL
Status: DISPENSED
Start: 2024-04-19

## (undated) RX ORDER — ONDANSETRON 2 MG/ML
INJECTION INTRAMUSCULAR; INTRAVENOUS
Status: DISPENSED
Start: 2024-04-19

## (undated) RX ORDER — PHENYLEPHRINE HCL IN 0.9% NACL 1 MG/10 ML
SYRINGE (ML) INTRAVENOUS
Status: DISPENSED
Start: 2018-03-24

## (undated) RX ORDER — HYDROMORPHONE HYDROCHLORIDE 1 MG/ML
INJECTION, SOLUTION INTRAMUSCULAR; INTRAVENOUS; SUBCUTANEOUS
Status: DISPENSED
Start: 2024-04-19

## (undated) RX ORDER — ACETAMINOPHEN 325 MG/1
TABLET ORAL
Status: DISPENSED
Start: 2025-06-17

## (undated) RX ORDER — BUPIVACAINE HYDROCHLORIDE AND EPINEPHRINE 5; 5 MG/ML; UG/ML
INJECTION, SOLUTION EPIDURAL; INTRACAUDAL; PERINEURAL
Status: DISPENSED
Start: 2025-06-17

## (undated) RX ORDER — FENTANYL CITRATE 50 UG/ML
INJECTION, SOLUTION INTRAMUSCULAR; INTRAVENOUS
Status: DISPENSED
Start: 2018-03-24

## (undated) RX ORDER — PROPOFOL 10 MG/ML
INJECTION, EMULSION INTRAVENOUS
Status: DISPENSED
Start: 2025-06-17

## (undated) RX ORDER — HYDROMORPHONE HCL IN WATER/PF 6 MG/30 ML
PATIENT CONTROLLED ANALGESIA SYRINGE INTRAVENOUS
Status: DISPENSED
Start: 2024-04-19

## (undated) RX ORDER — GLYCOPYRROLATE 0.2 MG/ML
INJECTION, SOLUTION INTRAMUSCULAR; INTRAVENOUS
Status: DISPENSED
Start: 2025-06-17

## (undated) RX ORDER — METRONIDAZOLE 500 MG/100ML
INJECTION, SOLUTION INTRAVENOUS
Status: DISPENSED
Start: 2024-04-19

## (undated) RX ORDER — WATER 10 ML/10ML
INJECTION INTRAMUSCULAR; INTRAVENOUS; SUBCUTANEOUS
Status: DISPENSED
Start: 2024-04-19

## (undated) RX ORDER — OXYCODONE HYDROCHLORIDE 5 MG/1
TABLET ORAL
Status: DISPENSED
Start: 2025-06-17

## (undated) RX ORDER — PROPOFOL 10 MG/ML
INJECTION, EMULSION INTRAVENOUS
Status: DISPENSED
Start: 2024-04-19

## (undated) RX ORDER — FENTANYL CITRATE 50 UG/ML
INJECTION, SOLUTION INTRAMUSCULAR; INTRAVENOUS
Status: DISPENSED
Start: 2024-04-19

## (undated) RX ORDER — FENTANYL CITRATE 50 UG/ML
INJECTION, SOLUTION INTRAMUSCULAR; INTRAVENOUS
Status: DISPENSED
Start: 2024-04-18

## (undated) RX ORDER — FENTANYL CITRATE 0.05 MG/ML
INJECTION, SOLUTION INTRAMUSCULAR; INTRAVENOUS
Status: DISPENSED
Start: 2024-04-19

## (undated) RX ORDER — CEFAZOLIN SODIUM/WATER 2 G/20 ML
SYRINGE (ML) INTRAVENOUS
Status: DISPENSED
Start: 2024-04-19

## (undated) RX ORDER — ONDANSETRON 2 MG/ML
INJECTION INTRAMUSCULAR; INTRAVENOUS
Status: DISPENSED
Start: 2018-03-24